# Patient Record
Sex: MALE | Race: BLACK OR AFRICAN AMERICAN | NOT HISPANIC OR LATINO | Employment: UNEMPLOYED | URBAN - METROPOLITAN AREA
[De-identification: names, ages, dates, MRNs, and addresses within clinical notes are randomized per-mention and may not be internally consistent; named-entity substitution may affect disease eponyms.]

---

## 2022-01-28 PROBLEM — Z12.11 COLON CANCER SCREENING: Status: ACTIVE | Noted: 2022-01-28

## 2022-08-13 ENCOUNTER — HOSPITAL ENCOUNTER (EMERGENCY)
Facility: HOSPITAL | Age: 52
Discharge: HOME | End: 2022-08-14
Attending: EMERGENCY MEDICINE
Payer: MEDICAID

## 2022-08-13 VITALS
HEART RATE: 68 BPM | RESPIRATION RATE: 17 BRPM | BODY MASS INDEX: 29.16 KG/M2 | TEMPERATURE: 98 F | DIASTOLIC BLOOD PRESSURE: 80 MMHG | SYSTOLIC BLOOD PRESSURE: 131 MMHG | WEIGHT: 220 LBS | OXYGEN SATURATION: 99 % | HEIGHT: 73 IN

## 2022-08-13 DIAGNOSIS — R42 DIZZINESS: ICD-10-CM

## 2022-08-13 DIAGNOSIS — E86.0 DEHYDRATION: ICD-10-CM

## 2022-08-13 DIAGNOSIS — N17.9 ACUTE KIDNEY INJURY: Primary | ICD-10-CM

## 2022-08-13 LAB
ALBUMIN SERPL BCP-MCNC: 4.7 G/DL (ref 3.5–5.2)
ALP SERPL-CCNC: 70 U/L (ref 55–135)
ALT SERPL W/O P-5'-P-CCNC: 23 U/L (ref 10–44)
ANION GAP SERPL CALC-SCNC: 13 MMOL/L (ref 8–16)
AST SERPL-CCNC: 20 U/L (ref 10–40)
BASOPHILS # BLD AUTO: 0.03 K/UL (ref 0–0.2)
BASOPHILS NFR BLD: 0.4 % (ref 0–1.9)
BILIRUB SERPL-MCNC: 0.3 MG/DL (ref 0.1–1)
BUN SERPL-MCNC: 28 MG/DL (ref 6–20)
CALCIUM SERPL-MCNC: 10.6 MG/DL (ref 8.7–10.5)
CHLORIDE SERPL-SCNC: 100 MMOL/L (ref 95–110)
CK SERPL-CCNC: 342 U/L (ref 20–200)
CO2 SERPL-SCNC: 25 MMOL/L (ref 23–29)
CREAT SERPL-MCNC: 2.4 MG/DL (ref 0.5–1.4)
DIFFERENTIAL METHOD: NORMAL
EOSINOPHIL # BLD AUTO: 0.1 K/UL (ref 0–0.5)
EOSINOPHIL NFR BLD: 1.2 % (ref 0–8)
ERYTHROCYTE [DISTWIDTH] IN BLOOD BY AUTOMATED COUNT: 11.5 % (ref 11.5–14.5)
EST. GFR  (NO RACE VARIABLE): 31 ML/MIN/1.73 M^2
GLUCOSE SERPL-MCNC: 159 MG/DL (ref 70–110)
HCT VFR BLD AUTO: 45.5 % (ref 40–54)
HGB BLD-MCNC: 14.8 G/DL (ref 14–18)
IMM GRANULOCYTES # BLD AUTO: 0.02 K/UL (ref 0–0.04)
IMM GRANULOCYTES NFR BLD AUTO: 0.3 % (ref 0–0.5)
LYMPHOCYTES # BLD AUTO: 3.2 K/UL (ref 1–4.8)
LYMPHOCYTES NFR BLD: 47.6 % (ref 18–48)
MAGNESIUM SERPL-MCNC: 2.2 MG/DL (ref 1.6–2.6)
MCH RBC QN AUTO: 28.5 PG (ref 27–31)
MCHC RBC AUTO-ENTMCNC: 32.5 G/DL (ref 32–36)
MCV RBC AUTO: 88 FL (ref 82–98)
MONOCYTES # BLD AUTO: 0.4 K/UL (ref 0.3–1)
MONOCYTES NFR BLD: 6.5 % (ref 4–15)
NEUTROPHILS # BLD AUTO: 3 K/UL (ref 1.8–7.7)
NEUTROPHILS NFR BLD: 44 % (ref 38–73)
NRBC BLD-RTO: 0 /100 WBC
PLATELET # BLD AUTO: 207 K/UL (ref 150–450)
PMV BLD AUTO: 11.4 FL (ref 9.2–12.9)
POTASSIUM SERPL-SCNC: 4 MMOL/L (ref 3.5–5.1)
PROT SERPL-MCNC: 9.2 G/DL (ref 6–8.4)
RBC # BLD AUTO: 5.2 M/UL (ref 4.6–6.2)
SODIUM SERPL-SCNC: 138 MMOL/L (ref 136–145)
TROPONIN I SERPL DL<=0.01 NG/ML-MCNC: <0.006 NG/ML (ref 0–0.03)
WBC # BLD AUTO: 6.74 K/UL (ref 3.9–12.7)

## 2022-08-13 PROCEDURE — 96360 HYDRATION IV INFUSION INIT: CPT

## 2022-08-13 PROCEDURE — 96361 HYDRATE IV INFUSION ADD-ON: CPT

## 2022-08-13 PROCEDURE — 80053 COMPREHEN METABOLIC PANEL: CPT | Performed by: EMERGENCY MEDICINE

## 2022-08-13 PROCEDURE — 99284 EMERGENCY DEPT VISIT MOD MDM: CPT | Mod: 25

## 2022-08-13 PROCEDURE — 84484 ASSAY OF TROPONIN QUANT: CPT | Performed by: EMERGENCY MEDICINE

## 2022-08-13 PROCEDURE — 93010 EKG 12-LEAD: ICD-10-PCS | Mod: ,,, | Performed by: INTERNAL MEDICINE

## 2022-08-13 PROCEDURE — 25000003 PHARM REV CODE 250: Performed by: EMERGENCY MEDICINE

## 2022-08-13 PROCEDURE — 83735 ASSAY OF MAGNESIUM: CPT | Performed by: EMERGENCY MEDICINE

## 2022-08-13 PROCEDURE — 82550 ASSAY OF CK (CPK): CPT | Performed by: EMERGENCY MEDICINE

## 2022-08-13 PROCEDURE — 93010 ELECTROCARDIOGRAM REPORT: CPT | Mod: ,,, | Performed by: INTERNAL MEDICINE

## 2022-08-13 PROCEDURE — 85025 COMPLETE CBC W/AUTO DIFF WBC: CPT | Performed by: EMERGENCY MEDICINE

## 2022-08-13 PROCEDURE — 93005 ELECTROCARDIOGRAM TRACING: CPT

## 2022-08-13 RX ADMIN — SODIUM CHLORIDE 1000 ML: 9 INJECTION, SOLUTION INTRAVENOUS at 10:08

## 2022-08-13 RX ADMIN — SODIUM CHLORIDE 1000 ML: 0.9 INJECTION, SOLUTION INTRAVENOUS at 09:08

## 2022-08-14 PROCEDURE — 96361 HYDRATE IV INFUSION ADD-ON: CPT

## 2022-08-14 NOTE — ED PROVIDER NOTES
Encounter Date: 8/13/2022       History     Chief Complaint   Patient presents with    Dizziness     Pt states that he got dizzy today around 1600 today and wanted to come in a get checked out. Pt states that he does work outside and this has never happened before and he denies being dizzy at this time but was concerned at the moment. Pt denies CP no SOB no nausea or vomiting.      51-year-old male with history of hypertension and pre diabetes presents after episode of lightheadedness and dizziness.  Symptoms occurred around 3:00 p.m. this afternoon while the patient was mowing the lawn.  He states he stepped in leaf felt dizzy and had to bend over to catch his breath.  He states symptoms improved and when he got home, he started experiencing cramping in his arms and legs.  He had no chest pain during this episode.  He reports he ate breakfast and drink water and Gatorade throughout the day.  He mows lawns for a living and states he can normally do this activity even in the heat without problem.        Review of patient's allergies indicates:  No Known Allergies  Past Medical History:   Diagnosis Date    Diabetes mellitus     Hypertension      Past Surgical History:   Procedure Laterality Date    COLONOSCOPY  01/28/2022    COLONOSCOPY N/A 1/28/2022    Procedure: COLONOSCOPY;  Surgeon: Don Mon MD;  Location: [facility];  Service: Endoscopy;  Laterality: N/A;    right knee       No family history on file.  Social History     Tobacco Use    Smoking status: Never Smoker    Smokeless tobacco: Never Used   Substance Use Topics    Alcohol use: No    Drug use: No     Review of Systems    Physical Exam     Initial Vitals [08/13/22 1919]   BP Pulse Resp Temp SpO2   117/78 90 18 98.5 °F (36.9 °C) 98 %      MAP       --         Physical Exam    Nursing note and vitals reviewed.  Constitutional: He appears well-developed and well-nourished. He is not diaphoretic. No distress.   HENT:   Head: Normocephalic and  atraumatic.   Eyes: Conjunctivae and EOM are normal. Pupils are equal, round, and reactive to light.   Neck: Neck supple.   Cardiovascular: Normal rate and regular rhythm.   Pulmonary/Chest: Breath sounds normal. No respiratory distress.   Abdominal: Abdomen is soft. Bowel sounds are normal.   Musculoskeletal:         General: No edema.      Cervical back: Neck supple.     Neurological: He is alert and oriented to person, place, and time. He has normal strength. No cranial nerve deficit or sensory deficit. GCS score is 15. GCS eye subscore is 4. GCS verbal subscore is 5. GCS motor subscore is 6.   No pronator drift, normal finger to nose testing   Skin: Skin is warm and dry.   Psychiatric: He has a normal mood and affect.         ED Course   Procedures  Labs Reviewed   COMPREHENSIVE METABOLIC PANEL - Abnormal; Notable for the following components:       Result Value    Glucose 159 (*)     BUN 28 (*)     Creatinine 2.4 (*)     Calcium 10.6 (*)     Total Protein 9.2 (*)     eGFR 31 (*)     All other components within normal limits   CK - Abnormal; Notable for the following components:     (*)     All other components within normal limits   CBC W/ AUTO DIFFERENTIAL   TROPONIN I   MAGNESIUM     EKG Readings: (Independently Interpreted)   Normal sinus rhythm, rate 64 beats per minute, normal VT interval,  milliseconds, no STEMI noted.       Imaging Results          X-Ray Chest AP Portable (Final result)  Result time 08/13/22 21:50:31    Final result by Jorden Otero MD (08/13/22 21:50:31)                 Impression:      No acute cardiopulmonary process identified.      Electronically signed by: Jorden Otero MD  Date:    08/13/2022  Time:    21:50             Narrative:    EXAMINATION:  XR CHEST AP PORTABLE    CLINICAL HISTORY:  light headed;    TECHNIQUE:  Single frontal view of the chest was performed.    COMPARISON:  None    FINDINGS:  Cardiac silhouette is normal in size.  Lungs are hypoinflated.   No evidence of focal consolidative process, pneumothorax, or significant pleural effusion.  No acute osseous abnormality identified.                                 Medications   sodium chloride 0.9% bolus 1,000 mL (has no administration in time range)   sodium chloride 0.9% bolus 1,000 mL (1,000 mLs Intravenous New Bag 8/13/22 2101)     Medical Decision Making:   Initial Assessment:   51-year-old male presents after episode of lightheadedness and dizziness that occurred while mowing the lawn in the heat this afternoon.  Symptoms resolved but states he developed some cramping when he returned home.  Currently feels improved.  He has been drinking water and Gatorade.  He did not have any chest pain during the episode.  On exam, the patient is well-appearing and in no distress.  Differential includes not limited to ACS, dehydration, electrolyte disturbance, Ortho status, rhabdomyolysis.  Workup initiated with labs including troponin, CPK, will treat with IV fluids.  ED Management:  On reassessment, patient reports feeling improved and denies complaints.  Labs reviewed, his creatinine is elevated at 2.4.  I reviewed previous records here as well as in care everywhere and do not see a previous creatinine in his chart.  The patient denies any previous history of kidney problems.  I recommend observation in the hospital for continued IV fluids and repeat creatinine.  The patient declines at this time stating he wants to go home and does not want to be hospitalized. The patient has decision making capacity . Patient has chosen to refuse medical evaluation/treatment and is able to communicate this verbally. Patient understands the relative risks, benefits, alternatives and consequences. Patient is able to reason, gives an adequate explanation of why he refuses evaluation/treatment.  As an alternate plan, we will give additional IV bolus.  I have instructed the patient to call his primary care physician on Monday morning for  repeat labs.  I also reviewed strict return precautions with the patient and advised him to return to the ED should he changes mind about being observed in the hospital for IV fluids. I have also asked him to hold his metformin and losartan until he follows up and has his kidney function rechecked.                       Clinical Impression:   Final diagnoses:  [R42] Dizziness  [N17.9] Acute kidney injury (Primary)  [E86.0] Dehydration          ED Disposition Condition    AMA               Roberta Ye MD  08/13/22 2765

## 2022-08-14 NOTE — ED TRIAGE NOTES
Pt states he became dizzy after cutting grass this afternoon, denies any CP or SOB, states he had mild cramping of extremities, resp even, Vitals WNL, NADN at this time

## 2022-08-26 ENCOUNTER — LAB VISIT (OUTPATIENT)
Dept: LAB | Facility: HOSPITAL | Age: 52
End: 2022-08-26
Attending: INTERNAL MEDICINE
Payer: MEDICAID

## 2022-08-26 DIAGNOSIS — Z00.00 ANNUAL PHYSICAL EXAM: ICD-10-CM

## 2022-08-26 DIAGNOSIS — Z11.59 NEED FOR HEPATITIS C SCREENING TEST: ICD-10-CM

## 2022-08-26 DIAGNOSIS — Z11.4 ENCOUNTER FOR SCREENING FOR HIV: ICD-10-CM

## 2022-08-26 PROBLEM — I10 HYPERTENSION: Status: ACTIVE | Noted: 2022-08-26

## 2022-08-26 PROBLEM — E11.9 DIABETES MELLITUS: Status: ACTIVE | Noted: 2022-08-26

## 2022-08-26 LAB
ALBUMIN SERPL BCP-MCNC: 4.1 G/DL (ref 3.5–5.2)
ALP SERPL-CCNC: 65 U/L (ref 55–135)
ALT SERPL W/O P-5'-P-CCNC: 19 U/L (ref 10–44)
ANION GAP SERPL CALC-SCNC: 10 MMOL/L (ref 8–16)
AST SERPL-CCNC: 18 U/L (ref 10–40)
BASOPHILS # BLD AUTO: 0.02 K/UL (ref 0–0.2)
BASOPHILS NFR BLD: 0.4 % (ref 0–1.9)
BILIRUB SERPL-MCNC: 0.4 MG/DL (ref 0.1–1)
BUN SERPL-MCNC: 15 MG/DL (ref 6–20)
CALCIUM SERPL-MCNC: 10.2 MG/DL (ref 8.7–10.5)
CHLORIDE SERPL-SCNC: 106 MMOL/L (ref 95–110)
CHOLEST SERPL-MCNC: 173 MG/DL (ref 120–199)
CHOLEST/HDLC SERPL: 3.3 {RATIO} (ref 2–5)
CO2 SERPL-SCNC: 26 MMOL/L (ref 23–29)
CREAT SERPL-MCNC: 1.6 MG/DL (ref 0.5–1.4)
DIFFERENTIAL METHOD: ABNORMAL
EOSINOPHIL # BLD AUTO: 0.1 K/UL (ref 0–0.5)
EOSINOPHIL NFR BLD: 2 % (ref 0–8)
ERYTHROCYTE [DISTWIDTH] IN BLOOD BY AUTOMATED COUNT: 11.9 % (ref 11.5–14.5)
EST. GFR  (NO RACE VARIABLE): 51 ML/MIN/1.73 M^2
GLUCOSE SERPL-MCNC: 115 MG/DL (ref 70–110)
HCT VFR BLD AUTO: 41.1 % (ref 40–54)
HDLC SERPL-MCNC: 53 MG/DL (ref 40–75)
HDLC SERPL: 30.6 % (ref 20–50)
HGB BLD-MCNC: 13 G/DL (ref 14–18)
IMM GRANULOCYTES # BLD AUTO: 0.01 K/UL (ref 0–0.04)
IMM GRANULOCYTES NFR BLD AUTO: 0.2 % (ref 0–0.5)
LDLC SERPL CALC-MCNC: 86.4 MG/DL (ref 63–159)
LYMPHOCYTES # BLD AUTO: 2.2 K/UL (ref 1–4.8)
LYMPHOCYTES NFR BLD: 43.6 % (ref 18–48)
MCH RBC QN AUTO: 28.4 PG (ref 27–31)
MCHC RBC AUTO-ENTMCNC: 31.6 G/DL (ref 32–36)
MCV RBC AUTO: 90 FL (ref 82–98)
MONOCYTES # BLD AUTO: 0.4 K/UL (ref 0.3–1)
MONOCYTES NFR BLD: 7.3 % (ref 4–15)
NEUTROPHILS # BLD AUTO: 2.3 K/UL (ref 1.8–7.7)
NEUTROPHILS NFR BLD: 46.5 % (ref 38–73)
NONHDLC SERPL-MCNC: 120 MG/DL
NRBC BLD-RTO: 0 /100 WBC
PLATELET # BLD AUTO: 201 K/UL (ref 150–450)
PMV BLD AUTO: 13.3 FL (ref 9.2–12.9)
POTASSIUM SERPL-SCNC: 4.6 MMOL/L (ref 3.5–5.1)
PROT SERPL-MCNC: 7.9 G/DL (ref 6–8.4)
RBC # BLD AUTO: 4.58 M/UL (ref 4.6–6.2)
SODIUM SERPL-SCNC: 142 MMOL/L (ref 136–145)
TRIGL SERPL-MCNC: 168 MG/DL (ref 30–150)
TSH SERPL DL<=0.005 MIU/L-ACNC: 1.02 UIU/ML (ref 0.4–4)
WBC # BLD AUTO: 4.93 K/UL (ref 3.9–12.7)

## 2022-08-26 PROCEDURE — 85025 COMPLETE CBC W/AUTO DIFF WBC: CPT | Performed by: INTERNAL MEDICINE

## 2022-08-26 PROCEDURE — 86803 HEPATITIS C AB TEST: CPT | Performed by: INTERNAL MEDICINE

## 2022-08-26 PROCEDURE — 84443 ASSAY THYROID STIM HORMONE: CPT | Performed by: INTERNAL MEDICINE

## 2022-08-26 PROCEDURE — 80053 COMPREHEN METABOLIC PANEL: CPT | Performed by: INTERNAL MEDICINE

## 2022-08-26 PROCEDURE — 80061 LIPID PANEL: CPT | Performed by: INTERNAL MEDICINE

## 2022-08-26 PROCEDURE — 83036 HEMOGLOBIN GLYCOSYLATED A1C: CPT | Performed by: INTERNAL MEDICINE

## 2022-08-26 PROCEDURE — 87389 HIV-1 AG W/HIV-1&-2 AB AG IA: CPT | Performed by: INTERNAL MEDICINE

## 2022-08-26 PROCEDURE — 36415 COLL VENOUS BLD VENIPUNCTURE: CPT | Performed by: INTERNAL MEDICINE

## 2022-08-27 LAB
ESTIMATED AVG GLUCOSE: 183 MG/DL (ref 68–131)
HBA1C MFR BLD: 8 % (ref 4–5.6)

## 2022-08-29 PROBLEM — E11.21 MACROALBUMINURIC DIABETIC NEPHROPATHY: Status: ACTIVE | Noted: 2022-08-29

## 2022-08-29 PROBLEM — N18.31 STAGE 3A CHRONIC KIDNEY DISEASE: Status: ACTIVE | Noted: 2022-08-29

## 2022-08-29 PROBLEM — D64.9 NORMOCYTIC ANEMIA: Status: ACTIVE | Noted: 2022-08-29

## 2022-08-29 LAB
HCV AB SERPL QL IA: NEGATIVE
HIV 1+2 AB+HIV1 P24 AG SERPL QL IA: NEGATIVE

## 2022-09-07 ENCOUNTER — TELEPHONE (OUTPATIENT)
Dept: FAMILY MEDICINE | Facility: CLINIC | Age: 52
End: 2022-09-07
Payer: MEDICAID

## 2022-09-07 NOTE — TELEPHONE ENCOUNTER
----- Message from Tawny Mckeon sent at 9/7/2022  8:28 AM CDT -----  Type:  Sooner Appointment Request    Patient is requesting a sooner appointment.  Patient declined first available appointment listed as well as another facility and provider .  Patient will not accept being placed on the waitlist and is requesting a message be sent to doctor.    Name of Caller: self     When is the first available appointment? 10/20     Symptoms: follow up     Would the patient rather a call back or a response via [facility]? Call back     Best Call Back Number: [phone]

## 2022-09-07 NOTE — TELEPHONE ENCOUNTER
Call placed to patient, and informed that he will need to call the [facility] where he sees  for an appointment. Patient acknowledged understanding.

## 2022-09-09 ENCOUNTER — LAB VISIT (OUTPATIENT)
Dept: LAB | Facility: HOSPITAL | Age: 52
End: 2022-09-09
Attending: INTERNAL MEDICINE
Payer: MEDICAID

## 2022-09-09 DIAGNOSIS — D64.9 NORMOCYTIC ANEMIA: ICD-10-CM

## 2022-09-09 LAB
FERRITIN SERPL-MCNC: 463 NG/ML (ref 20–300)
IRON SERPL-MCNC: 117 UG/DL (ref 45–160)
SATURATED IRON: 31 % (ref 20–50)
TOTAL IRON BINDING CAPACITY: 383 UG/DL (ref 250–450)
TRANSFERRIN SERPL-MCNC: 259 MG/DL (ref 200–375)

## 2022-09-09 PROCEDURE — 82728 ASSAY OF FERRITIN: CPT | Performed by: INTERNAL MEDICINE

## 2022-09-09 PROCEDURE — 82746 ASSAY OF FOLIC ACID SERUM: CPT | Performed by: INTERNAL MEDICINE

## 2022-09-09 PROCEDURE — 84466 ASSAY OF TRANSFERRIN: CPT | Performed by: INTERNAL MEDICINE

## 2022-09-09 PROCEDURE — 82607 VITAMIN B-12: CPT | Performed by: INTERNAL MEDICINE

## 2022-09-10 LAB
FOLATE SERPL-MCNC: 29.8 NG/ML (ref 4–24)
VIT B12 SERPL-MCNC: 1122 PG/ML (ref 210–950)

## 2022-09-12 PROBLEM — D63.8 ANEMIA OF CHRONIC DISEASE: Status: ACTIVE | Noted: 2022-08-29

## 2022-12-17 DIAGNOSIS — E08.00 DIABETES MELLITUS DUE TO UNDERLYING CONDITION WITH HYPEROSMOLARITY WITHOUT COMA, WITHOUT LONG-TERM CURRENT USE OF INSULIN: ICD-10-CM

## 2022-12-17 NOTE — TELEPHONE ENCOUNTER
Care Due:                  Date            Visit Type   Department     Provider  --------------------------------------------------------------------------------    Last Visit: None Found      None         None Found  Next Visit: None Scheduled  None         None Found                                                            Last  Test          Frequency    Reason                     Performed    Due Date  --------------------------------------------------------------------------------    Office Visit  12 months..  glimepiride..............  Not Found    Overdue    HBA1C.......  6 months...  glimepiride..............  08- 02-    [employer] Embedded Care Gaps. Reference number: [id number]. 12/17/2022   4:37:44 PM CST

## 2022-12-19 NOTE — TELEPHONE ENCOUNTER
Refill Routing Note   Medication(s) are not appropriate for processing by [facility] for the following reason(s):      - Required laboratory values are abnormal    [facility] action(s):  Defer Medication-related problems identified:   Requires labs  Requires appointment        Medication reconciliation completed: No     Appointments  past 12m or future 3m with PCP    Date Provider   Last Visit   9/9/2022 Bonny Jones MD   Next Visit   Visit date not found Bonny Jones MD   ED visits in past 90 days: 0        Note composed:2:03 PM 12/19/2022

## 2022-12-20 RX ORDER — GLIMEPIRIDE 2 MG/1
TABLET ORAL
Qty: 90 TABLET | Refills: 0 | OUTPATIENT
Start: 2022-12-20

## 2023-01-04 ENCOUNTER — HOSPITAL ENCOUNTER (EMERGENCY)
Facility: HOSPITAL | Age: 53
Discharge: HOME | End: 2023-01-04
Attending: EMERGENCY MEDICINE
Payer: MEDICAID

## 2023-01-04 VITALS
RESPIRATION RATE: 20 BRPM | DIASTOLIC BLOOD PRESSURE: 100 MMHG | TEMPERATURE: 98 F | WEIGHT: 225 LBS | OXYGEN SATURATION: 99 % | HEIGHT: 73 IN | HEART RATE: 73 BPM | SYSTOLIC BLOOD PRESSURE: 178 MMHG | BODY MASS INDEX: 29.82 KG/M2

## 2023-01-04 DIAGNOSIS — F07.81 POST CONCUSSIVE SYNDROME: Primary | ICD-10-CM

## 2023-01-04 DIAGNOSIS — T14.90XA TRAUMA: ICD-10-CM

## 2023-01-04 LAB
ALBUMIN SERPL BCP-MCNC: 4.3 G/DL (ref 3.5–5.2)
ALP SERPL-CCNC: 82 U/L (ref 55–135)
ALT SERPL W/O P-5'-P-CCNC: 23 U/L (ref 10–44)
ANION GAP SERPL CALC-SCNC: 8 MMOL/L (ref 8–16)
AST SERPL-CCNC: 15 U/L (ref 10–40)
BASOPHILS # BLD AUTO: 0.03 K/UL (ref 0–0.2)
BASOPHILS NFR BLD: 0.6 % (ref 0–1.9)
BILIRUB SERPL-MCNC: 0.2 MG/DL (ref 0.1–1)
BUN SERPL-MCNC: 18 MG/DL (ref 6–20)
CALCIUM SERPL-MCNC: 10.1 MG/DL (ref 8.7–10.5)
CHLORIDE SERPL-SCNC: 104 MMOL/L (ref 95–110)
CO2 SERPL-SCNC: 25 MMOL/L (ref 23–29)
CREAT SERPL-MCNC: 1.8 MG/DL (ref 0.5–1.4)
DIFFERENTIAL METHOD: ABNORMAL
EOSINOPHIL # BLD AUTO: 0.1 K/UL (ref 0–0.5)
EOSINOPHIL NFR BLD: 1.5 % (ref 0–8)
ERYTHROCYTE [DISTWIDTH] IN BLOOD BY AUTOMATED COUNT: 11.4 % (ref 11.5–14.5)
EST. GFR  (NO RACE VARIABLE): 44 ML/MIN/1.73 M^2
GLUCOSE SERPL-MCNC: 339 MG/DL (ref 70–110)
HCT VFR BLD AUTO: 43.3 % (ref 40–54)
HGB BLD-MCNC: 13.7 G/DL (ref 14–18)
IMM GRANULOCYTES # BLD AUTO: 0 K/UL (ref 0–0.04)
IMM GRANULOCYTES NFR BLD AUTO: 0 % (ref 0–0.5)
LYMPHOCYTES # BLD AUTO: 2.3 K/UL (ref 1–4.8)
LYMPHOCYTES NFR BLD: 43.9 % (ref 18–48)
MCH RBC QN AUTO: 28 PG (ref 27–31)
MCHC RBC AUTO-ENTMCNC: 31.6 G/DL (ref 32–36)
MCV RBC AUTO: 89 FL (ref 82–98)
MONOCYTES # BLD AUTO: 0.4 K/UL (ref 0.3–1)
MONOCYTES NFR BLD: 6.8 % (ref 4–15)
NEUTROPHILS # BLD AUTO: 2.4 K/UL (ref 1.8–7.7)
NEUTROPHILS NFR BLD: 47.2 % (ref 38–73)
NRBC BLD-RTO: 0 /100 WBC
PLATELET # BLD AUTO: 191 K/UL (ref 150–450)
PMV BLD AUTO: 12.1 FL (ref 9.2–12.9)
POTASSIUM SERPL-SCNC: 4.3 MMOL/L (ref 3.5–5.1)
PROT SERPL-MCNC: 8 G/DL (ref 6–8.4)
RBC # BLD AUTO: 4.89 M/UL (ref 4.6–6.2)
SODIUM SERPL-SCNC: 137 MMOL/L (ref 136–145)
WBC # BLD AUTO: 5.17 K/UL (ref 3.9–12.7)

## 2023-01-04 PROCEDURE — 99285 EMERGENCY DEPT VISIT HI MDM: CPT | Mod: 25

## 2023-01-04 PROCEDURE — 25500020 PHARM REV CODE 255: Performed by: EMERGENCY MEDICINE

## 2023-01-04 PROCEDURE — 80053 COMPREHEN METABOLIC PANEL: CPT | Performed by: EMERGENCY MEDICINE

## 2023-01-04 PROCEDURE — 85025 COMPLETE CBC W/AUTO DIFF WBC: CPT | Performed by: EMERGENCY MEDICINE

## 2023-01-04 RX ORDER — HYDROMORPHONE HYDROCHLORIDE 1 MG/ML
0.5 INJECTION, SOLUTION INTRAMUSCULAR; INTRAVENOUS; SUBCUTANEOUS
Status: DISCONTINUED | OUTPATIENT
Start: 2023-01-04 | End: 2023-01-04

## 2023-01-04 RX ORDER — BACLOFEN 10 MG/1
10 TABLET ORAL 3 TIMES DAILY
Qty: 30 TABLET | Refills: 0 | Status: SHIPPED | OUTPATIENT
Start: 2023-01-04 | End: 2023-05-23

## 2023-01-04 RX ORDER — ONDANSETRON 8 MG/1
8 TABLET, ORALLY DISINTEGRATING ORAL EVERY 6 HOURS PRN
Qty: 30 TABLET | Refills: 0 | Status: SHIPPED | OUTPATIENT
Start: 2023-01-04 | End: 2023-05-23

## 2023-01-04 RX ORDER — MECLIZINE HYDROCHLORIDE 50 MG/1
50 TABLET ORAL 2 TIMES DAILY PRN
Qty: 20 TABLET | Refills: 0 | Status: SHIPPED | OUTPATIENT
Start: 2023-01-04 | End: 2023-05-23

## 2023-01-04 RX ADMIN — IOHEXOL 75 ML: 350 INJECTION, SOLUTION INTRAVENOUS at 03:01

## 2023-01-04 NOTE — ED PROVIDER NOTES
Encounter Date: 1/4/2023       History     Chief Complaint   Patient presents with    Headache     Pt to ED via EMS with complaints of headache, dizziness and blurry vision after multiple 2x12 boards fell on his head. Pt also complains of right arm pain after falling on the ground. Pt denies LOC, N/V. Pt denies use of blood thinners or neck pain.      52 y.o. male Past Medical History:  No date: Diabetes mellitus  No date: Diabetes mellitus type I  No date: Hypertension      Patient states that just prior to arrival he was walking on the street and a Pallet filled with lumbar essentially fell on him striking his head, right shoulder, and arm thereby knocking him over .  Patient is not on blood thinners denies LOC, does endorse headache, dizziness, and blurry vision, as well as right arm pain.    Review of patient's allergies indicates:  No Known Allergies  Past Medical History:   Diagnosis Date    Diabetes mellitus     Diabetes mellitus type I     Hypertension      Past Surgical History:   Procedure Laterality Date    COLONOSCOPY  01/28/2022    COLONOSCOPY N/A 01/28/2022    Procedure: COLONOSCOPY;  Surgeon: Don Mon MD;  Location: [facility];  Service: Endoscopy;  Laterality: N/A;    right knee       Family History   Problem Relation Age of Onset    Kidney disease Mother     Hypertension Father     Diabetes type II Father      Social History     Tobacco Use    Smoking status: Never    Smokeless tobacco: Never   Substance Use Topics    Alcohol use: No    Drug use: No     Review of Systems   Constitutional:  Negative for fever.   HENT:  Negative for sore throat.    Respiratory:  Negative for shortness of breath.    Cardiovascular:  Negative for chest pain.   Gastrointestinal:  Negative for nausea.   Genitourinary:  Negative for dysuria.   Musculoskeletal:  Negative for back pain.   Skin:  Negative for rash.   Neurological:  Negative for weakness.   Hematological:  Does not bruise/bleed easily.   All other  systems reviewed and are negative.    Physical Exam     Initial Vitals   BP Pulse Resp Temp SpO2   01/04/23 1345 01/04/23 1345 01/04/23 1345 01/04/23 1349 01/04/23 1345   (!) 160/80 100 18 97.7 °F (36.5 °C) 100 %      MAP       --                Physical Exam    Nursing note and vitals reviewed.  Constitutional: He appears well-developed and well-nourished.   HENT:   Head: Normocephalic and atraumatic.   Eyes: EOM are normal. Pupils are equal, round, and reactive to light.   Cardiovascular:  Normal rate and regular rhythm.           Pulmonary/Chest: Effort normal.   Abdominal: He exhibits no distension.   Musculoskeletal:         General: No tenderness or edema.     Neurological: He is alert and oriented to person, place, and time. No cranial nerve deficit.   Skin: Skin is warm and dry.   Psychiatric: He has a normal mood and affect.     Ttp R shoulder, upper arm and elbow. Rom limited by pain  Normocephalic/atraumatic,   No midline tenderness on any spinal body on the neck or   On the back no tenderness to chest wall ribs abdomen or other extremities  ED Course   Procedures  Labs Reviewed   CBC W/ AUTO DIFFERENTIAL - Abnormal; Notable for the following components:       Result Value    Hemoglobin 13.7 (*)     MCHC 31.6 (*)     RDW 11.4 (*)     All other components within normal limits   COMPREHENSIVE METABOLIC PANEL - Abnormal; Notable for the following components:    Glucose 339 (*)     Creatinine 1.8 (*)     eGFR 44 (*)     All other components within normal limits          Imaging Results              CT Cervical Spine Without Contrast (Final result)  Result time 01/04/23 16:06:01      Final result by Rashi Correa III, MD (01/04/23 16:06:01)                   Impression:      No acute process seen.    Mild DJD.      Electronically signed by: Rashi Correa MD  Date:    01/04/2023  Time:    16:06               Narrative:    EXAMINATION:  CT CERVICAL SPINE WITHOUT CONTRAST    CLINICAL HISTORY:  Neck trauma  (Age >= 65y);    FINDINGS:  Odontoid prevertebral soft tissues and posterior elements are intact.  The facets are well aligned.  The temporal bones show nothing unusual.  There is mild DJD.  No fracture dislocation bone destruction seen.  The upper lungs are clear.  No soft tissue masses are seen.  The intracranial structures show nothing unusual.  No disc herniation, spinal canal stenosis, or neural foraminal stenosis seen.                                       CTA Head and Neck (xpd) (Final result)  Result time 01/04/23 16:11:06      Final result by Chip Crowe MD (01/04/23 16:11:06)                   Impression:      No evidence of acute hemorrhage or infarction.    Unremarkable CT arteriogram.  No evidence of high-grade stenosis or occlusion.    Borderline thyromegaly.    Please see separate report of the cervical spine.      Electronically signed by: Chip Crowe MD  Date:    01/04/2023  Time:    16:11               Narrative:    EXAMINATION:  CTA HEAD AND NECK (XPD)    CLINICAL HISTORY:  Dizziness, persistent/recurrent, cardiac or vascular cause suspected;    TECHNIQUE:  Axial CT images obtained throughout the region of the head before and after the administration of intravenous contrast.  CT angiogram was performed through the cervical and intracranial vasculature during the IV bolus administration of 75mL of Omnipaque 350.  Multiplanar MPR and MIP reformats were performed.    COMPARISON:  None    FINDINGS:  The ventricles are normal in size without evidence of hydrocephalus.    The brain appears within normal limits. No parenchymal mass, hemorrhage, edema or major vascular distribution infarct.    No extra-axial blood or fluid collections.    The cranium appears intact.  Mastoid air cells are clear. Mild patchy mucosal thickening in the visualized paranasal sinuses.    Thyroid gland appears mildly diffusely enlarged      CTA:    The aortic arch maintains a normal branching pattern.    The common and  internal carotid arteries are normal in course and caliber. No significant stenosis in either carotid bifurcation.    The vertebral origins are patent. The cervical vertebral arteries are normal in course and caliber. Vertebrobasilar system is within normal limits without focal abnormality.    The anterior, middle, and posterior cerebral arteries are within normal limits, without evidence of significant stenosis, focal occlusion, or intracranial aneurysm formation.                                       X-Ray Hand 3 view Right (Final result)  Result time 01/04/23 14:53:02      Final result by Rashi Correa III, MD (01/04/23 14:53:02)                   Narrative:    EXAMINATION:  XR HAND COMPLETE 3 VIEW RIGHT    CLINICAL HISTORY:  trauma;    FINDINGS:  Hand complete three views right: There is a small metallic foreign body in the soft tissues between the 3rd and 4th metacarpals.  There is mild DJD.  There is remote trauma of the 5th metacarpal.  No acute fracture dislocation bone destruction seen.      Electronically signed by: Rashi Correa MD  Date:    01/04/2023  Time:    14:53                                     X-Ray Shoulder Trauma Right (Final result)  Result time 01/04/23 14:49:44      Final result by Rashi Correa III, MD (01/04/23 14:49:44)                   Narrative:    EXAMINATION:  XR SHOULDER TRAUMA 3 VIEW RIGHT    CLINICAL HISTORY:  Injury, unspecified, initial encounter    FINDINGS:  Shoulder three views right: No fracture dislocation bone destruction seen.  No acute trauma seen.      Electronically signed by: Rashi Correa MD  Date:    01/04/2023  Time:    14:49                                     X-Ray Humerus 2 View Right (Final result)  Result time 01/04/23 14:50:06      Final result by Rashi Correa III, MD (01/04/23 14:50:06)                   Narrative:    EXAMINATION:  XR HUMERUS 2 VIEW RIGHT    CLINICAL HISTORY:  Injury, unspecified, initial encounter    FINDINGS:  Humerus two  views right: No fracture, dislocation, or bone destruction seen.  There is a triceps insertion spur on the olecranon.      Electronically signed by: Rashi Correa MD  Date:    01/04/2023  Time:    14:50                                     X-Ray Elbow Complete Right (Final result)  Result time 01/04/23 14:50:24      Final result by Rashi Correa III, MD (01/04/23 14:50:24)                   Narrative:    EXAMINATION:  XR ELBOW COMPLETE 3 VIEW RIGHT    CLINICAL HISTORY:  Injury, unspecified, initial encounter    FINDINGS:  Elbow complete three views right: There is a triceps insertion spur on the olecranon.  No joint effusion seen.  No fracture dislocation bone destruction seen.      Electronically signed by: Rashi Correa MD  Date:    01/04/2023  Time:    14:50                                     X-Ray Clavicle Right (Final result)  Result time 01/04/23 14:50:38      Final result by Rashi Correa III, MD (01/04/23 14:50:38)                   Narrative:    EXAMINATION:  XR CLAVICLE RIGHT    CLINICAL HISTORY:  Injury, unspecified, initial encounter    FINDINGS:  Clavicle right: No fracture dislocation bone destruction seen.  There is mild DJD of the AC joint.      Electronically signed by: Rashi Correa MD  Date:    01/04/2023  Time:    14:50                                     Medications   iohexoL (OMNIPAQUE 350) injection 75 mL (75 mLs Intravenous Given 1/4/23 1539)                      Have ordered CT head, C-spine, and CTA head and neck to evaluate for patient's symptoms. will do screening labs and xr RUE/Shoulder/clavicle     After review of the patient's physical exam, ED testing, and history/symptoms, relevant labs, imaging, available outside records  a wide differential was considered including but not limited to: infectious, traumatic, vascular, toxicological, malignant, ischemic, embolic, psychological, genetic, idiopathic and other etiologies.  labs/imaging/interventions include:   Labs Reviewed    CBC W/ AUTO DIFFERENTIAL - Abnormal; Notable for the following components:       Result Value    Hemoglobin 13.7 (*)     MCHC 31.6 (*)     RDW 11.4 (*)     All other components within normal limits   COMPREHENSIVE METABOLIC PANEL - Abnormal; Notable for the following components:    Glucose 339 (*)     Creatinine 1.8 (*)     eGFR 44 (*)     All other components within normal limits     CT Cervical Spine Without Contrast   Final Result      No acute process seen.      Mild DJD.         Electronically signed by: Rashi Correa MD   Date:    01/04/2023   Time:    16:06      CTA Head and Neck (xpd)   Final Result      No evidence of acute hemorrhage or infarction.      Unremarkable CT arteriogram.  No evidence of high-grade stenosis or occlusion.      Borderline thyromegaly.      Please see separate report of the cervical spine.         Electronically signed by: Chip Crowe MD   Date:    01/04/2023   Time:    16:11      X-Ray Hand 3 view Right   Final Result      X-Ray Shoulder Trauma Right   Final Result      X-Ray Humerus 2 View Right   Final Result      X-Ray Elbow Complete Right   Final Result      X-Ray Clavicle Right   Final Result             The suspected diagnosis, treatment and plan were discussed with the patient. All questions or concerns have been addressed.     Have refer to neurology will put on empiric treatment for nausea and postconcussive syndrome   Patient removed own C-collar  Clinical Impression:   Final diagnoses:  [T14.90XA] Trauma  [F07.81] Post concussive syndrome (Primary)               Caleb Tillman MD  01/04/23 [id number]       Caleb Tillman MD  01/04/23 9752

## 2023-01-04 NOTE — Clinical Note
"Jarrod"Angie Mercado was seen and treated in our emergency department on 1/4/2023.  He may return to work on 01/06/2023.       If you have any questions or concerns, please don't hesitate to call.      Caleb Tillman MD"

## 2023-01-04 NOTE — ED TRIAGE NOTES
Pt to ED via EMS with complaints of headache, R shoulder pain, and R arm pain after multiple pallets and 2X12's fell on pt PTA. Pt reports wood fell onto pts head and slid down R side. Pt now states has generalized headache. Tender R shoulder and tender R elbow. Pt has small abrasion and R elbow. Denies LOC. All vitals WNL. NADN

## 2023-01-04 NOTE — DISCHARGE INSTRUCTIONS
Thank you for coming to our Emergency Department today. It is important to remember that some problems or medical conditions are difficult to diagnose and may not be found or addressed during your Emergency Department visit.     Be sure to follow up with your primary care doctor and review all labs/imaging/tests that were performed during your ER visit with them. Some labs/imaging/tests may be outside of the normal range, and require non-emergent follow-up and/or further investigation/treatment/procedures/testing to help diagnose/exclude/prevent complications or other potentially serious medical conditions that were not discussed or addressed during your ER visit.    If you do not have a primary care doctor, you may contact the one listed on your discharge paperwork or you may also call the [facility] Appointment Desk at [phone] to schedule an appointment and establish care with one. It is important to your health that you have a primary care doctor.    Please take all medications as directed. All medications may potentially have side-effects and it is impossible to predict which medications may give you side-effects or what side-effects (if any) they will give you.. If you feel that you are having a negative effect or side-effect of any medication you should immediately stop taking them and seek medical attention. If you feel that you are having a life-threatening reaction call 911.    Return to the ER with any questions/concerns, new/concerning symptoms, worsening or failure to improve.     Do not drive, swim, climb to height, take a bath, operate heavy machinery, drink alcohol or take potentially sedating medications, sign any legal documents or make any important decisions for 24 hours if you have received any pain medications, sedatives or mood altering drugs during your ER visit or within 24 hours of taking them if they have been prescribed to you.     You can find additional resources for Dentists,  hearing aids, durable medical equipment, low cost pharmacies and other resources at https://[facility].org    BELOW THIS LINE ONLY APPLIES IF YOU HAVE A COVID TEST PENDING OR IF YOU HAVE BEEN DIAGNOSED WITH COVID:  Please access [facility] to review the results of your test. Until the results of your COVID test return, you should isolate yourself so as not to potentially spread illness to others.   If your COVID test returns positive, you should isolate yourself so as not to spread illness to others. After five full days, if you are feeling better and you have not had fever for 24 hours, you can return to your typical daily activities, but you must wear a mask around others for an additional 5 days.   If your COVID test returns negative and you are either unvaccinated or more than six months out from your two-dose vaccine and are not yet boosted, you should still quarantine for 5 full days followed by strict mask use for an additional 5 full days.   If your COVID test returns negative and you have received your 2-dose initial vaccine as well as a booster, you should continue strict mask use for 10 full days after the exposure.  For all those exposed, best practice includes a test at day 5 after the exposure. This can be a home test or a test through one of the many testing centers throughout our community.   Masking is always advised to limit the spread of COVID. Cdc.gov is an excellent site to obtain the latest up to date recommendations regarding COVID and COVID testing.     CDC Testing and Quarantine Guidelines for patients with exposure to a known-positive COVID-19 person:  A close exposure is defined as anyone who has had an exposure (masked or unmasked) to a known COVID -19 positive person within 6 feet of someone for a cumulative total of 15 minutes or more over a 24-hour period.   Vaccinated and/or if you recently had a positive covid test within 90 days do NOT need to quarantine after contact with someone  who had COVID-19 unless you develop symptoms.   Fully vaccinated people who have not had a positive test within 90 days, should get tested 3-5 days after their exposure, even if they don't have symptoms and wear a mask indoors in public for 14 days following exposure or until their test result is negative.      Unvaccinated and/or NOT had a positive test within 90 days and meet close exposure  You are required by CDC guidelines to quarantine for at least 5 days from time of exposure followed by 5 days of strict masking. It is recommended, but not required to test after 5 days, unless you develop symptoms, in which case you should test at that time.  If you get tested after 5 days and your test is positive, your 5 day period of isolation starts the day of the positive test.    If your exposure does not meet the above definition, you can return to your normal daily activities to include social distancing, wearing a mask and frequent handwashing.      Here is a link to guidance from the CDC:  https://www.cdc.gov/media/releases/2021/s1227-isolation-quarantine-guidance.html      [facility] Testing Sites:  https://ldh.[place].gov/page/3934      [facility] website with testing locations and guidance:  [email or web address]

## 2023-05-23 ENCOUNTER — TELEPHONE (OUTPATIENT)
Dept: FAMILY MEDICINE | Facility: CLINIC | Age: 53
End: 2023-05-23
Payer: MEDICAID

## 2023-05-23 PROBLEM — M72.2 PLANTAR FASCIITIS OF LEFT FOOT: Status: ACTIVE | Noted: 2023-05-23

## 2023-05-23 PROBLEM — M25.511 CHRONIC RIGHT SHOULDER PAIN: Status: ACTIVE | Noted: 2023-05-23

## 2023-05-23 PROBLEM — G44.329 CHRONIC POST-TRAUMATIC HEADACHE, NOT INTRACTABLE: Status: ACTIVE | Noted: 2023-05-23

## 2023-05-23 PROBLEM — G89.29 CHRONIC RIGHT SHOULDER PAIN: Status: ACTIVE | Noted: 2023-05-23

## 2023-05-23 PROBLEM — F51.01 PRIMARY INSOMNIA: Status: ACTIVE | Noted: 2023-05-23

## 2023-05-23 NOTE — TELEPHONE ENCOUNTER
Patient informed that his EKG will must be done at the facility where the ordering provider is practicing so that the strip may be read immediately by that provider or at [facility] cardiology dept.  He verbalized understanding.  Appointment cancelled.

## 2023-05-24 DIAGNOSIS — E08.00 DIABETES MELLITUS DUE TO UNDERLYING CONDITION WITH HYPEROSMOLARITY WITHOUT COMA, WITHOUT LONG-TERM CURRENT USE OF INSULIN: Primary | ICD-10-CM

## 2023-05-24 RX ORDER — LINAGLIPTIN 5 MG/1
5 TABLET, FILM COATED ORAL DAILY
Qty: 90 TABLET | Refills: 0 | Status: SHIPPED | OUTPATIENT
Start: 2023-05-24 | End: 2023-09-19

## 2023-06-28 ENCOUNTER — TELEPHONE (OUTPATIENT)
Dept: NEUROLOGY | Facility: CLINIC | Age: 53
End: 2023-06-28
Payer: MEDICAID
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
## 2023-06-28 NOTE — TELEPHONE ENCOUNTER
----- Message from Misty Estrella RN sent at 6/28/2023 10:17 AM CDT -----  Regarding: FW: Appt  Contact: Pt [phone]    ----- Message -----  From: Stefanie Gavin  Sent: 5/31/2023  10:12 AM CDT  To: , #  Subject: Appt                                             Pt is calling to schedule appt, pt has a referral in the system please call

## 2023-09-18 DIAGNOSIS — E08.22 DIABETES MELLITUS DUE TO UNDERLYING CONDITION WITH STAGE 3 CHRONIC KIDNEY DISEASE, WITHOUT LONG-TERM CURRENT USE OF INSULIN, UNSPECIFIED WHETHER STAGE 3A OR 3B CKD: ICD-10-CM

## 2023-09-18 DIAGNOSIS — N18.30 DIABETES MELLITUS DUE TO UNDERLYING CONDITION WITH STAGE 3 CHRONIC KIDNEY DISEASE, WITHOUT LONG-TERM CURRENT USE OF INSULIN, UNSPECIFIED WHETHER STAGE 3A OR 3B CKD: ICD-10-CM

## 2023-09-18 RX ORDER — GLIMEPIRIDE 4 MG/1
4 TABLET ORAL
Qty: 90 TABLET | Refills: 0 | OUTPATIENT
Start: 2023-09-18 | End: 2024-09-17

## 2023-09-18 NOTE — TELEPHONE ENCOUNTER
----- Message from Hope Miles sent at 9/18/2023  3:12 PM CDT -----  Type: RX Refill Request    Who Called:  self     Have you contacted your pharmacy: no    Refill or New Rx: refill    RX Name and Strength: glimepiride (AMARYL) 4 MG tablet      Preferred Pharmacy with phone number: [id number]  [place], [place] - [address] [place] [address]   Phone:  [phone]  Fax:  [phone]    Local or Mail Order: local    Would the patient rather a call back or a response via [facility]?  call    Best Call Back Number: [phone] (home)      Additional Information:

## 2023-09-18 NOTE — TELEPHONE ENCOUNTER
No care due was identified.  [facility] Embedded Care Due Messages. Reference number: [id number].   9/18/2023 3:23:20 PM CDT

## 2023-09-20 ENCOUNTER — OFFICE VISIT (OUTPATIENT)
Dept: CARDIOLOGY | Facility: CLINIC | Age: 53
End: 2023-09-20
Payer: MEDICAID

## 2023-09-20 DIAGNOSIS — R07.9 CHEST PAIN IN ADULT: ICD-10-CM

## 2023-09-20 PROCEDURE — 4010F ACE/ARB THERAPY RXD/TAKEN: CPT | Mod: CPTII,,, | Performed by: INTERNAL MEDICINE

## 2023-09-20 PROCEDURE — 99024 PR POST-OP FOLLOW-UP VISIT: ICD-10-PCS | Mod: ,,, | Performed by: INTERNAL MEDICINE

## 2023-09-20 PROCEDURE — 99024 POSTOP FOLLOW-UP VISIT: CPT | Mod: ,,, | Performed by: INTERNAL MEDICINE

## 2023-09-20 PROCEDURE — 3066F NEPHROPATHY DOC TX: CPT | Mod: CPTII,,, | Performed by: INTERNAL MEDICINE

## 2023-09-20 PROCEDURE — 3060F POS MICROALBUMINURIA REV: CPT | Mod: CPTII,,, | Performed by: INTERNAL MEDICINE

## 2023-09-20 PROCEDURE — 3066F PR DOCUMENTATION OF TREATMENT FOR NEPHROPATHY: ICD-10-PCS | Mod: CPTII,,, | Performed by: INTERNAL MEDICINE

## 2023-09-20 PROCEDURE — 3046F PR MOST RECENT HEMOGLOBIN A1C LEVEL > 9.0%: ICD-10-PCS | Mod: CPTII,,, | Performed by: INTERNAL MEDICINE

## 2023-09-20 PROCEDURE — 3060F PR POS MICROALBUMINURIA RESULT DOCUMENTED/REVIEW: ICD-10-PCS | Mod: CPTII,,, | Performed by: INTERNAL MEDICINE

## 2023-09-20 PROCEDURE — 4010F PR ACE/ARB THEARPY RXD/TAKEN: ICD-10-PCS | Mod: CPTII,,, | Performed by: INTERNAL MEDICINE

## 2023-09-20 PROCEDURE — 3046F HEMOGLOBIN A1C LEVEL >9.0%: CPT | Mod: CPTII,,, | Performed by: INTERNAL MEDICINE

## 2023-09-27 DIAGNOSIS — E11.69 HYPERLIPIDEMIA ASSOCIATED WITH TYPE 2 DIABETES MELLITUS: Primary | ICD-10-CM

## 2023-09-27 DIAGNOSIS — E78.5 HYPERLIPIDEMIA ASSOCIATED WITH TYPE 2 DIABETES MELLITUS: Primary | ICD-10-CM

## 2023-09-27 RX ORDER — ROSUVASTATIN CALCIUM 40 MG/1
40 TABLET, COATED ORAL NIGHTLY
Qty: 90 TABLET | Refills: 3 | Status: SHIPPED | OUTPATIENT
Start: 2023-09-27 | End: 2024-03-21

## 2023-11-30 ENCOUNTER — OFFICE VISIT (OUTPATIENT)
Dept: ENDOCRINOLOGY | Facility: CLINIC | Age: 53
End: 2023-11-30
Payer: MEDICAID

## 2023-11-30 VITALS
HEART RATE: 93 BPM | WEIGHT: 228 LBS | SYSTOLIC BLOOD PRESSURE: 124 MMHG | TEMPERATURE: 98 F | BODY MASS INDEX: 30.08 KG/M2 | DIASTOLIC BLOOD PRESSURE: 74 MMHG

## 2023-11-30 DIAGNOSIS — N18.30 DIABETES MELLITUS DUE TO UNDERLYING CONDITION WITH STAGE 3 CHRONIC KIDNEY DISEASE, WITHOUT LONG-TERM CURRENT USE OF INSULIN, UNSPECIFIED WHETHER STAGE 3A OR 3B CKD: ICD-10-CM

## 2023-11-30 DIAGNOSIS — E08.22 DIABETES MELLITUS DUE TO UNDERLYING CONDITION WITH STAGE 3 CHRONIC KIDNEY DISEASE, WITHOUT LONG-TERM CURRENT USE OF INSULIN, UNSPECIFIED WHETHER STAGE 3A OR 3B CKD: ICD-10-CM

## 2023-11-30 DIAGNOSIS — E08.00 DIABETES MELLITUS DUE TO UNDERLYING CONDITION WITH HYPEROSMOLARITY WITHOUT COMA, WITHOUT LONG-TERM CURRENT USE OF INSULIN: Primary | ICD-10-CM

## 2023-11-30 DIAGNOSIS — I10 HYPERTENSION, UNSPECIFIED TYPE: ICD-10-CM

## 2023-11-30 DIAGNOSIS — R80.9 MICROALBUMINURIA: ICD-10-CM

## 2023-11-30 LAB — GLUCOSE SERPL-MCNC: 195 MG/DL (ref 70–110)

## 2023-11-30 PROCEDURE — 3078F DIAST BP <80 MM HG: CPT | Mod: CPTII,,, | Performed by: NURSE PRACTITIONER

## 2023-11-30 PROCEDURE — 3046F PR MOST RECENT HEMOGLOBIN A1C LEVEL > 9.0%: ICD-10-PCS | Mod: CPTII,,, | Performed by: NURSE PRACTITIONER

## 2023-11-30 PROCEDURE — 3060F POS MICROALBUMINURIA REV: CPT | Mod: CPTII,,, | Performed by: NURSE PRACTITIONER

## 2023-11-30 PROCEDURE — 1160F RVW MEDS BY RX/DR IN RCRD: CPT | Mod: CPTII,,, | Performed by: NURSE PRACTITIONER

## 2023-11-30 PROCEDURE — 1159F PR MEDICATION LIST DOCUMENTED IN MEDICAL RECORD: ICD-10-PCS | Mod: CPTII,,, | Performed by: NURSE PRACTITIONER

## 2023-11-30 PROCEDURE — 99214 OFFICE O/P EST MOD 30 MIN: CPT | Mod: PBBFAC | Performed by: NURSE PRACTITIONER

## 2023-11-30 PROCEDURE — 3046F HEMOGLOBIN A1C LEVEL >9.0%: CPT | Mod: CPTII,,, | Performed by: NURSE PRACTITIONER

## 2023-11-30 PROCEDURE — 1160F PR REVIEW ALL MEDS BY PRESCRIBER/CLIN PHARMACIST DOCUMENTED: ICD-10-PCS | Mod: CPTII,,, | Performed by: NURSE PRACTITIONER

## 2023-11-30 PROCEDURE — 99204 OFFICE O/P NEW MOD 45 MIN: CPT | Mod: S$PBB,,, | Performed by: NURSE PRACTITIONER

## 2023-11-30 PROCEDURE — 99999 PR PBB SHADOW E&M-EST. PATIENT-LVL IV: CPT | Mod: PBBFAC,,, | Performed by: NURSE PRACTITIONER

## 2023-11-30 PROCEDURE — 3008F BODY MASS INDEX DOCD: CPT | Mod: CPTII,,, | Performed by: NURSE PRACTITIONER

## 2023-11-30 PROCEDURE — 99999PBSHW POCT GLUCOSE, HAND-HELD DEVICE: Mod: PBBFAC,,,

## 2023-11-30 PROCEDURE — 99999PBSHW POCT GLUCOSE, HAND-HELD DEVICE: ICD-10-PCS | Mod: PBBFAC,,,

## 2023-11-30 PROCEDURE — 3074F SYST BP LT 130 MM HG: CPT | Mod: CPTII,,, | Performed by: NURSE PRACTITIONER

## 2023-11-30 PROCEDURE — 1159F MED LIST DOCD IN RCRD: CPT | Mod: CPTII,,, | Performed by: NURSE PRACTITIONER

## 2023-11-30 PROCEDURE — 4010F PR ACE/ARB THEARPY RXD/TAKEN: ICD-10-PCS | Mod: CPTII,,, | Performed by: NURSE PRACTITIONER

## 2023-11-30 PROCEDURE — 3066F NEPHROPATHY DOC TX: CPT | Mod: CPTII,,, | Performed by: NURSE PRACTITIONER

## 2023-11-30 PROCEDURE — 3074F PR MOST RECENT SYSTOLIC BLOOD PRESSURE < 130 MM HG: ICD-10-PCS | Mod: CPTII,,, | Performed by: NURSE PRACTITIONER

## 2023-11-30 PROCEDURE — 3078F PR MOST RECENT DIASTOLIC BLOOD PRESSURE < 80 MM HG: ICD-10-PCS | Mod: CPTII,,, | Performed by: NURSE PRACTITIONER

## 2023-11-30 PROCEDURE — 3066F PR DOCUMENTATION OF TREATMENT FOR NEPHROPATHY: ICD-10-PCS | Mod: CPTII,,, | Performed by: NURSE PRACTITIONER

## 2023-11-30 PROCEDURE — 3008F PR BODY MASS INDEX (BMI) DOCUMENTED: ICD-10-PCS | Mod: CPTII,,, | Performed by: NURSE PRACTITIONER

## 2023-11-30 PROCEDURE — 99999 PR PBB SHADOW E&M-EST. PATIENT-LVL IV: ICD-10-PCS | Mod: PBBFAC,,, | Performed by: NURSE PRACTITIONER

## 2023-11-30 PROCEDURE — 99204 PR OFFICE/OUTPT VISIT, NEW, LEVL IV, 45-59 MIN: ICD-10-PCS | Mod: S$PBB,,, | Performed by: NURSE PRACTITIONER

## 2023-11-30 PROCEDURE — 4010F ACE/ARB THERAPY RXD/TAKEN: CPT | Mod: CPTII,,, | Performed by: NURSE PRACTITIONER

## 2023-11-30 PROCEDURE — 3060F PR POS MICROALBUMINURIA RESULT DOCUMENTED/REVIEW: ICD-10-PCS | Mod: CPTII,,, | Performed by: NURSE PRACTITIONER

## 2023-11-30 PROCEDURE — 82962 GLUCOSE BLOOD TEST: CPT | Mod: PBBFAC | Performed by: NURSE PRACTITIONER

## 2023-11-30 RX ORDER — GLIMEPIRIDE 4 MG/1
4 TABLET ORAL
Qty: 90 TABLET | Refills: 0 | Status: SHIPPED | OUTPATIENT
Start: 2023-11-30

## 2023-11-30 RX ORDER — LINAGLIPTIN 5 MG/1
5 TABLET, FILM COATED ORAL DAILY
Qty: 90 TABLET | Refills: 0 | Status: SHIPPED | OUTPATIENT
Start: 2023-11-30

## 2023-11-30 RX ORDER — INSULIN PUMP SYRINGE, 3 ML
EACH MISCELLANEOUS
Qty: 1 EACH | Refills: 0 | Status: SHIPPED | OUTPATIENT
Start: 2023-11-30 | End: 2024-11-29

## 2023-11-30 RX ORDER — LANCETS
EACH MISCELLANEOUS
Qty: 200 EACH | Refills: 3 | Status: SHIPPED | OUTPATIENT
Start: 2023-11-30

## 2023-11-30 NOTE — Clinical Note
Adrien Jones, patient didn't know what the visit was for when he came and I believe that's why he was a bit resistant to recommendations. But I think he'll make some improvements by next visit. Thanks, Clover

## 2023-11-30 NOTE — PATIENT INSTRUCTIONS
A1C goal: <7%  Fasting/premeal blood glucose goal:   2 hour post-meal blood glucose goal: less than 180    Discussed progression of DM disease, long term complications, and tx options. Reviewed A1c and BG goals.   Avoid beverages with sugar.   Improve diet - avoid cookies/candy.   See Diabetes Education.     Patient not open taking injections. Discussed Rybelsus but pt declines today.   Start Tradjenta once daily.   Hold Jardiance for now d/t risk of  infections with significant hyperglycemia.     Test sugar 2x/day, fasting and bedtime. Bring meter /written sugar log to every visit.     Return to clinic in 3 months with labs prior.
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
## 2023-12-07 ENCOUNTER — CLINICAL SUPPORT (OUTPATIENT)
Dept: DIABETES | Facility: CLINIC | Age: 53
End: 2023-12-07
Payer: MEDICAID

## 2023-12-07 VITALS — BODY MASS INDEX: 30.09 KG/M2 | WEIGHT: 227 LBS | HEIGHT: 73 IN

## 2023-12-07 DIAGNOSIS — E08.00 DIABETES MELLITUS DUE TO UNDERLYING CONDITION WITH HYPEROSMOLARITY WITHOUT COMA, WITHOUT LONG-TERM CURRENT USE OF INSULIN: ICD-10-CM

## 2023-12-07 PROCEDURE — G0108 DIAB MANAGE TRN  PER INDIV: HCPCS | Mod: PBBFAC

## 2023-12-07 PROCEDURE — 99999 PR PBB SHADOW E&M-EST. PATIENT-LVL I: CPT | Mod: PBBFAC,,,

## 2023-12-07 PROCEDURE — 99999PBSHW PR PBB SHADOW TECHNICAL ONLY FILED TO HB: Mod: PBBFAC,,,

## 2023-12-07 PROCEDURE — 99999PBSHW PR PBB SHADOW TECHNICAL ONLY FILED TO HB: ICD-10-PCS | Mod: PBBFAC,,,

## 2023-12-07 PROCEDURE — 99999 PR PBB SHADOW E&M-EST. PATIENT-LVL I: ICD-10-PCS | Mod: PBBFAC,,,

## 2023-12-07 PROCEDURE — 99211 OFF/OP EST MAY X REQ PHY/QHP: CPT | Mod: PBBFAC

## 2023-12-07 NOTE — PROGRESS NOTES
"Diabetes Care Specialist Progress Note  Author: Tavia Diaz RN  Date: 12/7/2023    Program Intake  Reason for Diabetes Program Visit:: Initial Diabetes Assessment  Current diabetes risk level:: high  In the last 12 months, have you:: used emergency room services  Was the ER or hospital admission related to diabetes?: No  Permission to speak with others about care:: yes    Lab Results   Component Value Date    HGBA1C 12.2 (H) 09/26/2023       Clinical  Weight: 103 kg (227 lb)   Height: 6' 1" (185.4 cm)   Body mass index is 29.95 kg/m².    Clinical Assessment  Current Diabetes Treatment: Oral Medication (Glimepiride 4mg daily w/breakfast and Tradjenta 5mg daily)  Have you ever experienced hypoglycemia (low blood sugar)?: no  Have you ever experienced hyperglycemia (high blood sugar)?: no    Medication Information  How do you obtain your medications?: Patient drives  How many days a week do you miss your medications?: 3 or more (Pt said he not taking Tradjenta (but he has picked it up from pharmacy))  Do you sometimes have difficulty refilling your medications?: No  Medication adherence impacting ability to self-manage diabetes?: No    Labs  Do you have regular lab work to monitor your medications?: Yes  Type of Regular Lab Work: A1c  Where do you get your labs drawn?: [facility]  Lab Compliance Barriers: No    Nutritional Status  Diet: Regular  Meal Plan 24 Hour Recall: Breakfast, Lunch, Dinner, Snack  Meal Plan 24 Hour Recall - Breakfast: amador,egg,cheese sandwich  Meal Plan 24 Hour Recall - Lunch: amador,egg,cheese sandwich with a sprite  Meal Plan 24 Hour Recall - Dinner: 12 nuggets, fries and a sprite or red beans/rice  Meal Plan 24 Hour Recall - Snack: chip-ahoy cookies, or pie, or chips  Change in appetite?: No  Recent Changes in Weight: No Recent Weight Change  Current nutritional status an area of need that is impacting patient's ability to self-manage diabetes?: No    Additional Social History  Support  Does " anyone support you with your diabetes care?: yes  Who takes you to your medical appointments?: self  Does the current support meet the patient's needs?: Yes  Is Support an area impacting ability to self-manage diabetes?: No    Access to Mass Media & Technology  Does the patient have access to any of the following devices or technologies?: Smart phone  Media or technology needs impacting ability to self-manage diabetes?: No    Cognitive/Behavioral Health  Alert and Oriented: Yes  Difficulty Thinking: No  Requires Prompting: No  Requires assistance for routine expression?: No  Cognitive or behavioral barriers impacting ability to self-manage diabetes?: No    Culture/Yazidi  Culture or Roman Catholic beliefs that may impact ability to access healthcare: No    Communication  Language preference: English  Hearing Problems: No  Vision Problems: Yes  Vision problem type:: Decreased Vision  Vision Assistance: Glasses  Communication needs impacting ability to self-manage diabetes?: No    Health Literacy  Preferred Learning Method: Face to Face, Reading Materials  How often do you need to have someone help you read instructions, pamphlets, or written material from your doctor or pharmacy?: Never  Health literacy needs impacting ability to self-manage diabetes?: No      Diabetes Self-Management Skills Assessment  Diabetes Disease Process/Treatment Options  Patient/caregiver able to state what happens when someone has diabetes.: somewhat  Patient/caregiver knows what type of diabetes they have.: yes  Diabetes Type : Type II  Patient/caregiver able to identify at least three signs and symptoms of diabetes.: no  Patient able to identify at least three risk factors for diabetes.: no  Diabetes Disease Process/Treatment Options: Skills Assessment Completed: Yes  Assessment indicates:: Knowledge deficit, Instruction Needed  Area of need?: Yes    Nutrition/Healthy Eating  Challenges to healthy eating:: portion control  Method of  carbohydrate measurement:: no method  Patient can identify foods that impact blood sugar.: yes  Patient-identified foods:: soda, starches (bread, pasta, rice, cereal), sweets  Nutrition/Healthy Eating Skills Assessment Completed:: Yes  Assessment indicates:: Instruction Needed, Knowledge deficit  Area of need?: Yes    Physical Activity/Exercise  Physical Activity/Exercise Skills Assessment Completed: : No  Deffered due to:: Time    Medications  Patient is able to describe current diabetes management routine.: yes  Diabetes management routine:: diet, oral medications  Patient is able to identify current diabetes medications, dosages, and appropriate timing of medications.: yes  Patient understands the purpose of the medications taken for diabetes.: no  Patient reports problems or concerns with current medication regimen.: no  Medication Skills Assessment Completed:: Yes  Assessment indicates:: Instruction Needed, Knowledge deficit  Area of need?: Yes    Home Blood Glucose Monitoring  Patient states that blood sugar is checked at home daily.: no  Reasons for not monitoring:: other (see comments) (Pt need to  glucometer from the pharmacy)  Home Blood Glucose Monitoring Skills Assessment Completed: : Yes  Assessment indicates:: Instruction Needed, Knowledge deficit  Area of need?: Yes    Acute Complications  Acute Complications Skills Assessment Completed: : No  Deffered due to:: Time    Chronic Complications  Chronic Complications Skills Assessment Completed: : No  Deferred due to:: Time    Psychosocial/Coping  Psychosocial/Coping Skills Assessment Completed: : No  Deffered due to:: Time      Assessment Summary and Plan    Based on today's diabetes care assessment, the following areas of need were identified:          12/7/2023    12:01 AM   Social   Support No   Access to Mass Media/Tech No   Cognitive/Behavioral Health No   Culture/Hoahaoism No   Communication No   Health Literacy No            12/7/2023     12:01 AM   Clinical   Medication Adherence No   Lab Compliance No   Nutritional Status No            12/7/2023    12:01 AM   Diabetes Self-Management Skills   Diabetes Disease Process/Treatment Options Yes- Provided DM management guide and discussed what is diabetes and the significance of current A1c.    Nutrition/Healthy Eating Yes- see care planning   Medication Yes- Pt to start taking his Tradjenta as RX. Reviewed Patient's current medication regimen. MOA reviewed including common side effects.   Home Blood Glucose Monitoring Yes- Discussed BS goals and importance of monitoring and recording BS for review and maintenance of medication. Pt to test daily or at least 3X's week and bring BS log to next visit. Pt verbalized understanding.       Today's interventions were provided through individual discussion, instruction, and written materials were provided.      Patient verbalized understanding of instruction and written materials.  Pt was able to return back demonstration of instructions today. Patient understood key points, needs reinforcement and further instruction.     Diabetes Self-Management Care Plan:    Today's Diabetes Self-Management Care Plan was developed with Jarrod's input. Jarrod has agreed to work toward the following goal(s) to improve his/her overall diabetes control.      Care Plan: Diabetes Management   Updates made since 11/7/2023 12:00 AM        Problem: Healthy Eating         Goal: Eat 2-3 meals daily with 30-45g/2-3 servings of Carbohydrate per meal. Limit snacking in between meal to 1 serving (15 grams).    Start Date: 12/7/2023   Expected End Date: 12/28/2023   Priority: High   Barriers: No Barriers Identified   Note:    12/7/23 - - We concentrated on portion sizes at today's session. Overall meal planning and various choices for meals. Discussed carb vs non-carb foods, appropriate amount of carbs to have at meals/snacks and acceptable serving sizes of individual carb items. Obtained a  24-hr food recall from patient. Discussed various meal plan options to promote healthy eating. Discussed the importance of avoiding sugary drinks and replace with non-caloric drinks. Pt verbalized understanding.     - - Practiced reading food labels on various food items with patient focusing on serving size and total carbohydrate intake (not sugar intake). Instructed on appropriate serving sizes of specific carb containing foods. Discussed having lean protein at all meals and adding non starchy vegetables at lunch and dinner. Instructed pt to aim for 2-3 evenly spaced meals or a small carb snack in place of a missed meal. Written education information provided to patient for use at home. Pt verbalized understanding of all the above.       Task: Provided visual examples using dry measuring cups, food models, and other familiar objects such as computer mouse, deck or cards, tennis ball etc. to help with visualization of portions. Completed 12/7/2023        Task: Explained how to count carbohydrates using the food label and the use of dry measuring cups for accurate carb counting. Completed 12/7/2023     Task: Recommended replacing beverages containing high sugar content with noncaloric/sugar free options and/or water. Completed 12/7/2023        Task: Review the importance of balancing carbohydrates with each meal using portion control techniques to count servings of carbohydrate and label reading to identify serving size and amount of total carbs per serving. Completed 12/7/2023        Task: Provided Sample plate method and reviewed the use of the plate to estimate amounts of carbohydrate per meal. Completed 12/7/2023          Follow Up Plan     Follow up in about 3 weeks (around 12/28/2023) for F/U #1 review BS log and meal planning.    Today's care plan and follow up schedule was discussed with patient.  Jarrod verbalized understanding of the care plan, goals, and agrees to follow up plan.        The patient was  encouraged to communicate with his/her health care provider/physician and care team regarding his/her condition(s) and treatment.  I provided the patient with my contact information today and encouraged to contact me via phone or [facility] Patient Portal as needed.     Length of Visit   Total Time: 60 Minutes

## 2023-12-28 ENCOUNTER — CLINICAL SUPPORT (OUTPATIENT)
Dept: DIABETES | Facility: CLINIC | Age: 53
End: 2023-12-28
Payer: MEDICAID

## 2023-12-28 VITALS — HEIGHT: 73 IN | WEIGHT: 226.19 LBS | BODY MASS INDEX: 29.98 KG/M2

## 2023-12-28 DIAGNOSIS — E08.00 DIABETES MELLITUS DUE TO UNDERLYING CONDITION WITH HYPEROSMOLARITY WITHOUT COMA, WITHOUT LONG-TERM CURRENT USE OF INSULIN: Primary | ICD-10-CM

## 2023-12-28 PROCEDURE — 99999PBSHW PR PBB SHADOW TECHNICAL ONLY FILED TO HB: ICD-10-PCS | Mod: PBBFAC,,,

## 2023-12-28 PROCEDURE — 99999PBSHW PR PBB SHADOW TECHNICAL ONLY FILED TO HB: Mod: PBBFAC,,,

## 2023-12-28 PROCEDURE — G0108 DIAB MANAGE TRN  PER INDIV: HCPCS | Mod: PBBFAC

## 2023-12-28 NOTE — PROGRESS NOTES
"Diabetes Care Specialist Progress Note  Author: Tavia Diaz RN  Date: 12/28/2023    Program Intake  Reason for Diabetes Program Visit:: Intervention  Type of Intervention:: Individual  Individual: Education  Education: Self-Management Skill Review, Nutrition and Meal Planning  Current diabetes risk level:: high  In the last 12 months, have you:: used emergency room services  Was the ER or hospital admission related to diabetes?: No  Permission to speak with others about care:: yes    Lab Results   Component Value Date    HGBA1C 12.2 (H) 09/26/2023       Clinical  Weight: 102.6 kg (226 lb 3.2 oz)   Height: 6' 1" (185.4 cm)   Body mass index is 29.84 kg/m².    Clinical Assessment  Current Diabetes Treatment: Oral Medication (Tradjenta 5mg daily and Glimepiride 4mg daily w/breakfast)  Have you ever experienced hypoglycemia (low blood sugar)?: no  Have you ever experienced hyperglycemia (high blood sugar)?: no    Medication Information  How do you obtain your medications?: Patient drives  How many days a week do you miss your medications?: 3 or more (Pt haven't been taking his Tradjenta; discussed importance of taking meds as prescribed. Pt agreed to start Tradjenta as RX.)  Do you sometimes have difficulty refilling your medications?: No  Medication adherence impacting ability to self-manage diabetes?: No    Labs  Do you have regular lab work to monitor your medications?: Yes  Type of Regular Lab Work: A1c  Where do you get your labs drawn?: [facility]  Lab Compliance Barriers: No    Nutritional Status  Diet: Regular  Meal Plan 24 Hour Recall: Breakfast, Lunch, Dinner  Meal Plan 24 Hour Recall - Breakfast: [employer] meal w/sprite  Meal Plan 24 Hour Recall - Lunch: amador,egg,cheese sandwich with a sprite  Meal Plan 24 Hour Recall - Dinner: 1/2 Pizza  Change in appetite?: No  Recent Changes in Weight: No Recent Weight Change  Current nutritional status an area of need that is impacting patient's ability to " self-manage diabetes?: Yes    Additional Social History  Support  Does anyone support you with your diabetes care?: yes  Who supports you?: self  Who takes you to your medical appointments?: self  Does the current support meet the patient's needs?: Yes  Is Support an area impacting ability to self-manage diabetes?: No    Access to Mass Media & Technology  Does the patient have access to any of the following devices or technologies?: Smart phone  Media or technology needs impacting ability to self-manage diabetes?: No    Cognitive/Behavioral Health  Alert and Oriented: Yes  Difficulty Thinking: No  Requires Prompting: No  Requires assistance for routine expression?: No  Cognitive or behavioral barriers impacting ability to self-manage diabetes?: No    Culture/Congregation  Culture or Scientologist beliefs that may impact ability to access healthcare: No    Communication  Language preference: English  Hearing Problems: No  Vision Problems: Yes  Vision problem type:: Decreased Vision  Vision Assistance: Glasses  Communication needs impacting ability to self-manage diabetes?: No    Health Literacy  Preferred Learning Method: Face to Face, Reading Materials  How often do you need to have someone help you read instructions, pamphlets, or written material from your doctor or pharmacy?: Never  Health literacy needs impacting ability to self-manage diabetes?: No      Diabetes Self-Management Skills Assessment  Diabetes Disease Process/Treatment Options  Patient/caregiver able to state what happens when someone has diabetes.: somewhat  Patient/caregiver knows what type of diabetes they have.: yes  Diabetes Type : Type II  Patient/caregiver able to identify at least three signs and symptoms of diabetes.: no  Patient able to identify at least three risk factors for diabetes.: no  Diabetes Disease Process/Treatment Options: Skills Assessment Completed: Yes  Assessment indicates:: Instruction Needed  Area of need?:  No    Nutrition/Healthy Eating  Challenges to healthy eating:: portion control  Method of carbohydrate measurement:: eyeballing/guessing  Patient can identify foods that impact blood sugar.: yes  Patient-identified foods:: soda, starches (bread, pasta, rice, cereal), sweets  Nutrition/Healthy Eating Skills Assessment Completed:: Yes  Assessment indicates:: Instruction Needed, Knowledge deficit  Area of need?: Yes    Physical Activity/Exercise  Patient's daily activity level:: moderately active  Patient formally exercises outside of work.: no  Reasons for not exercising:: other (see comments) (Pt's job is [employer])  Patient can identify forms of physical activity.: yes  Stated forms of physical activity:: any movement performed by muscles that uses energy  Patient can identify reasons why exercise/physical activity is important in diabetes management.: no  Physical Activity/Exercise Skills Assessment Completed: : Yes  Assessment indicates:: Instruction Needed, Knowledge deficit  Area of need?: Yes    Medications  Patient is able to describe current diabetes management routine.: yes  Diabetes management routine:: diet, oral medications  Patient is able to identify current diabetes medications, dosages, and appropriate timing of medications.: yes  Patient understands the purpose of the medications taken for diabetes.: no  Patient reports problems or concerns with current medication regimen.: no  Medication Skills Assessment Completed:: Yes  Assessment indicates:: Instruction Needed, Knowledge deficit  Area of need?: Yes    Home Blood Glucose Monitoring  Patient states that blood sugar is checked at home daily.: no  Reasons for not monitoring:: other (see comments) (Pt says he has trouble getting blood from finger; discussed various techniques to get blood from finger. Pt was able to test BS in the office.)  Home Blood Glucose Monitoring Skills Assessment Completed: : Yes  Assessment indicates:: Instruction Needed,  Knowledge deficit  Area of need?: Yes    Acute Complications  Patient is able to identify types of acute complications: No  Acute Complications Skills Assessment Completed: : Yes  Assessment indicates:: Instruction Needed, Knowledge deficit  Area of need?: Yes    Chronic Complications  Chronic Complications Skills Assessment Completed: : No  Deferred due to:: Time    Psychosocial/Coping  Psychosocial/Coping Skills Assessment Completed: : No  Area of need?: No      Assessment Summary and Plan    Based on today's diabetes care assessment, the following areas of need were identified:          12/28/2023    12:01 AM   Social   Support No   Access to Mass Media/Tech No   Cognitive/Behavioral Health No   Culture/Yarsanism No   Communication No   Health Literacy No            12/28/2023    12:01 AM   Clinical   Medication Adherence No   Lab Compliance No   Nutritional Status Yes            12/28/2023    12:01 AM   Diabetes Self-Management Skills   Diabetes Disease Process/Treatment Options No   Nutrition/Healthy Eating Yes- see care planning   Physical Activity/Exercise Yes- Pt to increase exercise as tolerated. Discussed benefits of exercise as it relates to insulin resistance and weight loss.   Medication Yes- Reviewed Patient's current medication regimen. MOA reviewed including common side effects. Discussed the importance of taking medications as prescribed.    Home Blood Glucose Monitoring Yes- Discussed BS goals and importance of monitoring and recording BS for review and maintenance of medication.   Acute Complications Yes- Discussed prevention, detection, signs and symptoms, and treatment of hypoglycemia following rule of 15.    Psychosocial/Coping No      Today's interventions were provided through individual discussion, instruction, and written materials were provided.      Patient verbalized understanding of instruction and written materials.  Pt was able to return back demonstration of instructions today.  Patient understood key points, needs reinforcement and further instruction.     Diabetes Self-Management Care Plan:    Today's Diabetes Self-Management Care Plan was developed with Jarrod's input. Jarrod has agreed to work toward the following goal(s) to improve his/her overall diabetes control.      Care Plan: Diabetes Management   Updates made since 11/28/2023 12:00 AM        Problem: Healthy Eating         Goal: Eat 2-3 meals daily with 30-45g/2-3 servings of Carbohydrate per meal. Limit snacking in between meal to 1 serving (15 grams).    Start Date: 12/7/2023   Expected End Date: 12/28/2023   This Visit's Progress: No change   Priority: High   Barriers: No Barriers Identified   Note:    12/7/23 - - We concentrated on portion sizes at today's session. Overall meal planning and various choices for meals. Discussed carb vs non-carb foods, appropriate amount of carbs to have at meals/snacks and acceptable serving sizes of individual carb items. Obtained a 24-hr food recall from patient. Discussed various meal plan options to promote healthy eating. Discussed the importance of avoiding sugary drinks and replace with non-caloric drinks. Pt verbalized understanding.     - - Practiced reading food labels on various food items with patient focusing on serving size and total carbohydrate intake (not sugar intake). Instructed on appropriate serving sizes of specific carb containing foods. Discussed having lean protein at all meals and adding non starchy vegetables at lunch and dinner. Instructed pt to aim for 2-3 evenly spaced meals or a small carb snack in place of a missed meal. Written education information provided to patient for use at home. Pt verbalized understanding of all the above.      Care Plan Update 12/28/23 - - Pt hasn't been closely monitoring his carb intake. We reviewed carb sources of foods and appropriate serving sizes of carbs at meal and snack time. Pt says he still eat fast foods; we discussed  choosing healthier choices when eating fast foods. Discussed having lean protein at all meals and non starchy vegetables at lunch and dinner. Instructed pt to aim for 3 evenly spaced meals or a small carb snack in place of a missed meal. Pt verbalized understanding of all the above.      Task: Provided visual examples using dry measuring cups, food models, and other familiar objects such as computer mouse, deck or cards, tennis ball etc. to help with visualization of portions. Completed 12/7/2023        Task: Explained how to count carbohydrates using the food label and the use of dry measuring cups for accurate carb counting. Completed 12/7/2023     Task: Recommended replacing beverages containing high sugar content with noncaloric/sugar free options and/or water. Completed 12/7/2023        Task: Review the importance of balancing carbohydrates with each meal using portion control techniques to count servings of carbohydrate and label reading to identify serving size and amount of total carbs per serving. Completed 12/7/2023        Task: Provided Sample plate method and reviewed the use of the plate to estimate amounts of carbohydrate per meal. Completed 12/7/2023        Follow Up Plan     Follow up in about 8 weeks (around 2/20/2024) for F/U BS readings and meal planning.    Today's care plan and follow up schedule was discussed with patient.  Jarrod verbalized understanding of the care plan, goals, and agrees to follow up plan.        The patient was encouraged to communicate with his/her health care provider/physician and care team regarding his/her condition(s) and treatment.  I provided the patient with my contact information today and encouraged to contact me via phone or [facility] Patient Portal as needed.     Length of Visit   Total Time: 60 Minutes

## 2024-02-23 ENCOUNTER — HOSPITAL ENCOUNTER (EMERGENCY)
Facility: HOSPITAL | Age: 54
Discharge: ANOTHER HOSPITAL | End: 2024-02-23
Attending: EMERGENCY MEDICINE
Payer: MEDICAID

## 2024-02-23 VITALS
BODY MASS INDEX: 29.03 KG/M2 | DIASTOLIC BLOOD PRESSURE: 92 MMHG | OXYGEN SATURATION: 96 % | WEIGHT: 220 LBS | RESPIRATION RATE: 18 BRPM | SYSTOLIC BLOOD PRESSURE: 155 MMHG | TEMPERATURE: 98 F | HEART RATE: 59 BPM

## 2024-02-23 DIAGNOSIS — T07.XXXA MULTIPLE LACERATIONS: ICD-10-CM

## 2024-02-23 DIAGNOSIS — S61.011A LACERATION OF RIGHT THUMB WITHOUT FOREIGN BODY WITHOUT DAMAGE TO NAIL, INITIAL ENCOUNTER: ICD-10-CM

## 2024-02-23 DIAGNOSIS — S62.624B OPEN DISPLACED FRACTURE OF MIDDLE PHALANX OF RIGHT RING FINGER, INITIAL ENCOUNTER: Primary | ICD-10-CM

## 2024-02-23 DIAGNOSIS — S62.654B OPEN NONDISPLACED FRACTURE OF MIDDLE PHALANX OF RIGHT RING FINGER, INITIAL ENCOUNTER: ICD-10-CM

## 2024-02-23 DIAGNOSIS — Z01.818 PREOPERATIVE CLEARANCE: ICD-10-CM

## 2024-02-23 LAB
ALBUMIN SERPL BCP-MCNC: 4.2 G/DL (ref 3.5–5.2)
ALP SERPL-CCNC: 74 U/L (ref 55–135)
ALT SERPL W/O P-5'-P-CCNC: 19 U/L (ref 10–44)
ANION GAP SERPL CALC-SCNC: 8 MMOL/L (ref 8–16)
AST SERPL-CCNC: 17 U/L (ref 10–40)
BACTERIA #/AREA URNS HPF: ABNORMAL /HPF
BASOPHILS # BLD AUTO: 0.02 K/UL (ref 0–0.2)
BASOPHILS NFR BLD: 0.4 % (ref 0–1.9)
BILIRUB SERPL-MCNC: 0.7 MG/DL (ref 0.1–1)
BILIRUB UR QL STRIP: NEGATIVE
BUN SERPL-MCNC: 15 MG/DL (ref 6–20)
CALCIUM SERPL-MCNC: 9.4 MG/DL (ref 8.7–10.5)
CHLORIDE SERPL-SCNC: 104 MMOL/L (ref 95–110)
CLARITY UR: CLEAR
CO2 SERPL-SCNC: 23 MMOL/L (ref 23–29)
COLOR UR: YELLOW
CREAT SERPL-MCNC: 1.8 MG/DL (ref 0.5–1.4)
DIFFERENTIAL METHOD BLD: ABNORMAL
EOSINOPHIL # BLD AUTO: 0.1 K/UL (ref 0–0.5)
EOSINOPHIL NFR BLD: 0.9 % (ref 0–8)
ERYTHROCYTE [DISTWIDTH] IN BLOOD BY AUTOMATED COUNT: 11.6 % (ref 11.5–14.5)
EST. GFR  (NO RACE VARIABLE): 44 ML/MIN/1.73 M^2
GLUCOSE SERPL-MCNC: 351 MG/DL (ref 70–110)
GLUCOSE UR QL STRIP: ABNORMAL
HCT VFR BLD AUTO: 38.2 % (ref 40–54)
HGB BLD-MCNC: 12.4 G/DL (ref 14–18)
HGB UR QL STRIP: NEGATIVE
HYALINE CASTS #/AREA URNS LPF: 5 /LPF
IMM GRANULOCYTES # BLD AUTO: 0.01 K/UL (ref 0–0.04)
IMM GRANULOCYTES NFR BLD AUTO: 0.2 % (ref 0–0.5)
KETONES UR QL STRIP: NEGATIVE
LEUKOCYTE ESTERASE UR QL STRIP: NEGATIVE
LYMPHOCYTES # BLD AUTO: 2.1 K/UL (ref 1–4.8)
LYMPHOCYTES NFR BLD: 37.6 % (ref 18–48)
MCH RBC QN AUTO: 28.1 PG (ref 27–31)
MCHC RBC AUTO-ENTMCNC: 32.5 G/DL (ref 32–36)
MCV RBC AUTO: 86 FL (ref 82–98)
MICROSCOPIC COMMENT: ABNORMAL
MONOCYTES # BLD AUTO: 0.4 K/UL (ref 0.3–1)
MONOCYTES NFR BLD: 6.9 % (ref 4–15)
NEUTROPHILS # BLD AUTO: 3 K/UL (ref 1.8–7.7)
NEUTROPHILS NFR BLD: 54 % (ref 38–73)
NITRITE UR QL STRIP: NEGATIVE
NRBC BLD-RTO: 0 /100 WBC
PH UR STRIP: 6 [PH] (ref 5–8)
PLATELET # BLD AUTO: 142 K/UL (ref 150–450)
PMV BLD AUTO: 11.7 FL (ref 9.2–12.9)
POTASSIUM SERPL-SCNC: 3.7 MMOL/L (ref 3.5–5.1)
PROT SERPL-MCNC: 7.2 G/DL (ref 6–8.4)
PROT UR QL STRIP: NEGATIVE
RBC # BLD AUTO: 4.42 M/UL (ref 4.6–6.2)
SODIUM SERPL-SCNC: 135 MMOL/L (ref 136–145)
SP GR UR STRIP: 1.01 (ref 1–1.03)
URN SPEC COLLECT METH UR: ABNORMAL
UROBILINOGEN UR STRIP-ACNC: NEGATIVE EU/DL
WBC # BLD AUTO: 5.48 K/UL (ref 3.9–12.7)
WBC #/AREA URNS HPF: 1 /HPF (ref 0–5)
YEAST URNS QL MICRO: ABNORMAL

## 2024-02-23 PROCEDURE — 96375 TX/PRO/DX INJ NEW DRUG ADDON: CPT

## 2024-02-23 PROCEDURE — 85025 COMPLETE CBC W/AUTO DIFF WBC: CPT | Performed by: EMERGENCY MEDICINE

## 2024-02-23 PROCEDURE — 93005 ELECTROCARDIOGRAM TRACING: CPT

## 2024-02-23 PROCEDURE — 25000003 PHARM REV CODE 250: Performed by: EMERGENCY MEDICINE

## 2024-02-23 PROCEDURE — 99285 EMERGENCY DEPT VISIT HI MDM: CPT | Mod: 25

## 2024-02-23 PROCEDURE — 93010 ELECTROCARDIOGRAM REPORT: CPT | Mod: ,,, | Performed by: INTERNAL MEDICINE

## 2024-02-23 PROCEDURE — 81000 URINALYSIS NONAUTO W/SCOPE: CPT | Performed by: EMERGENCY MEDICINE

## 2024-02-23 PROCEDURE — 96376 TX/PRO/DX INJ SAME DRUG ADON: CPT

## 2024-02-23 PROCEDURE — 96365 THER/PROPH/DIAG IV INF INIT: CPT

## 2024-02-23 PROCEDURE — 63600175 PHARM REV CODE 636 W HCPCS: Performed by: EMERGENCY MEDICINE

## 2024-02-23 PROCEDURE — 80053 COMPREHEN METABOLIC PANEL: CPT | Performed by: EMERGENCY MEDICINE

## 2024-02-23 RX ORDER — HYDROMORPHONE HYDROCHLORIDE 1 MG/ML
1 INJECTION, SOLUTION INTRAMUSCULAR; INTRAVENOUS; SUBCUTANEOUS
Status: COMPLETED | OUTPATIENT
Start: 2024-02-23 | End: 2024-02-23

## 2024-02-23 RX ORDER — CEFAZOLIN SODIUM 1 G/50ML
1 SOLUTION INTRAVENOUS
Status: COMPLETED | OUTPATIENT
Start: 2024-02-23 | End: 2024-02-23

## 2024-02-23 RX ADMIN — CEFAZOLIN SODIUM 1 G: 1 SOLUTION INTRAVENOUS at 04:02

## 2024-02-23 RX ADMIN — HYDROMORPHONE HYDROCHLORIDE 1 MG: 1 INJECTION, SOLUTION INTRAMUSCULAR; INTRAVENOUS; SUBCUTANEOUS at 04:02

## 2024-02-23 RX ADMIN — SODIUM CHLORIDE 1000 ML: 9 INJECTION, SOLUTION INTRAVENOUS at 04:02

## 2024-02-23 RX ADMIN — HYDROMORPHONE HYDROCHLORIDE 1 MG: 1 INJECTION, SOLUTION INTRAMUSCULAR; INTRAVENOUS; SUBCUTANEOUS at 07:02

## 2024-02-23 NOTE — ED TRIAGE NOTES
"Pt to the ED by POV with complaints of injury/lac to every finger on right hand. Pt reports he went to fix his jammed  PTA and "the pressure released and sliced across the top of his fingers". Pt presents with hand in towel, bleeding controlled. Pt reports "I think my middle finger is dangling there, hanging on by a thread". Pt AAOx4.  "

## 2024-02-23 NOTE — ED PROVIDER NOTES
"Encounter Date: 2/23/2024    SCRIBE #1 NOTE: I, Tami Rubi, am scribing for, and in the presence of,  Omar Hunter MD. I have scribed the following portions of the note - Other sections scribed: HPI, ROS.       History     Chief Complaint   Patient presents with    Finger Injury     Pt to the ED by POV with complaints of injury/lac to every finger on right hand. Pt reports he went to fix his jammed  PTA and "the pressure released and sliced across the top of his fingers". Pt presents with hand in towel, bleeding controlled. Pt reports "I think my middle finger is dangling there, hanging on by a thread". Pt AAOx4.     This is a 53 year old male, with a PMHx of DM, HLD, and HTN, who presents to the ED complaining of Laceration to Right Hand, symptoms onset one hour PTA. Patient reports laceration to right middle finger, index finger, and thumb. Patient states he went to fix his jammed  and "the pressure released and sliced across the top of my fingers". Patient also states his last Tetanus immunization was a few years ago. Patient denies cough, shortness of breath, chest pain, fever, chills, abdominal pain, nausea, vomiting, diarrhea, dysuria, headaches, congestion, sore throat, arm or leg trouble, eye pain, ear pain, rash, or other associated symptoms. No other alleviating or exacerbating factors. This is the extent of the patient's complaints in the ED. NKDA.                The history is provided by the patient. No  was used.     Review of patient's allergies indicates:  No Known Allergies  Past Medical History:   Diagnosis Date    Diabetes mellitus     Diabetes mellitus type I     Hyperlipidemia associated with type 2 diabetes mellitus 9/27/2023    Hypertension      Past Surgical History:   Procedure Laterality Date    COLONOSCOPY  01/28/2022    COLONOSCOPY N/A 01/28/2022    Procedure: COLONOSCOPY;  Surgeon: Don Mon MD;  Location: [facility];  Service: " Endoscopy;  Laterality: N/A;    right knee       Family History   Problem Relation Age of Onset    Kidney disease Mother     Hypertension Father     Diabetes type II Father      Social History     Tobacco Use    Smoking status: Never    Smokeless tobacco: Never   Substance Use Topics    Alcohol use: Yes     Comment: occasionally    Drug use: No     Review of Systems   Constitutional:  Negative for chills, diaphoresis and fever.   HENT:  Negative for congestion, ear pain and sore throat.    Eyes:  Negative for pain and visual disturbance.   Respiratory:  Negative for cough and shortness of breath.    Cardiovascular:  Negative for chest pain.   Gastrointestinal:  Negative for abdominal pain, diarrhea, nausea and vomiting.   Genitourinary:  Negative for dysuria.   Musculoskeletal:  Negative for myalgias.   Skin:  Negative for rash.        (+) laceration to right middle finger, index finger, and thumb   Neurological:  Negative for headaches.       Physical Exam     Initial Vitals   BP Pulse Resp Temp SpO2   02/23/24 1601 02/23/24 1601 02/23/24 1601 02/23/24 1603 02/23/24 1601   (!) 170/96 74 18 97.6 °F (36.4 °C) 100 %      MAP       --                Physical Exam  The patient was examined specifically for the following:   General:No significant distress, Good color, Warm and dry. Head and neck:Scalp atraumatic, Neck supple. Neurological:Appropriate conversation, Gross motor deficits. Eyes:Conjugate gaze, Clear corneas. ENT: No epistaxis. Cardiac: Regular rate and rhythm, Grossly normal heart tones. Pulmonary: Wheezing, Rales. Gastrointestinal: Abdominal tenderness, Abdominal distention. Musculoskeletal: Extremity deformity, Apparent pain with range of motion of the joints. Skin: Rash.   The findings on examination were normal except for the following:  The patient has a full-thickness laceration of the right middle finger at the PIP joint dorsally.  The joint appears to be unstable.  I am suspicious of open fracture.   There is a 1.5 cm flap laceration of the dorsum of the D IP of the right index finger some superficial lacerations to the distal thumb, right hand.              ED Course   Procedures  Labs Reviewed   COMPREHENSIVE METABOLIC PANEL - Abnormal; Notable for the following components:       Result Value    Sodium 135 (*)     Glucose 351 (*)     Creatinine 1.8 (*)     eGFR 44 (*)     All other components within normal limits   CBC W/ AUTO DIFFERENTIAL - Abnormal; Notable for the following components:    RBC 4.42 (*)     Hemoglobin 12.4 (*)     Hematocrit 38.2 (*)     Platelets 142 (*)     All other components within normal limits   URINALYSIS, REFLEX TO URINE CULTURE - Abnormal; Notable for the following components:    Glucose, UA 4+ (*)     All other components within normal limits    Narrative:     Specimen Source->Urine   URINALYSIS MICROSCOPIC - Abnormal; Notable for the following components:    Hyaline Casts, UA 5 (*)     All other components within normal limits    Narrative:     Specimen Source->Urine          Imaging Results              X-Ray Chest 1 View (Final result)  Result time 02/23/24 17:19:49      Final result by Bryan Coats MD (02/23/24 17:19:49)                   Impression:      No detrimental change or radiographic acute intrathoracic process seen.      Electronically signed by: Bryan Coats MD  Date:    02/23/2024  Time:    17:19               Narrative:    EXAMINATION:  XR CHEST 1 VIEW    CLINICAL HISTORY:  Encounter for other preprocedural examination    TECHNIQUE:  Single frontal view of the chest was performed.    COMPARISON:  Chest radiograph 08/13/2022    FINDINGS:  Monitoring leads overlie the chest.  No detrimental change.The lungs are symmetrically well expanded and clear, with normal appearance of pulmonary vasculature and no pleural effusion or pneumothorax.    The cardiac silhouette is normal in size. The hilar and mediastinal contours are unremarkable.    Bones are intact.                                        X-Ray Hand 2 View Right (Final result)  Result time 02/23/24 17:22:43      Final result by Jorden Otero MD (02/23/24 17:22:43)                   Impression:      Acute fractures of the 2nd and 3rd finger middle phalanx.      Electronically signed by: Jorden Otero MD  Date:    02/23/2024  Time:    17:22               Narrative:    EXAMINATION:  XR HAND 2 VIEW RIGHT    CLINICAL HISTORY:  Unspecified multiple injuries, initial encounter    TECHNIQUE:  Right hand 4 total views.    COMPARISON:  January 2023.    FINDINGS:  Acute displaced fracture is seen through the 3rd finger middle phalanx with posterior displacement of the distal fracture fragment.  Additional acute nondisplaced fracture is seen of the 2nd finger middle phalanx.    No additional acute displaced fracture or dislocation seen.  There is chronic change and likely remote fracture injury of the scaphoid again seen.  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  Punctate metallic foreign body is again seen adjacent to the 3rd metacarpal head, unchanged.                                       Medications   sodium chloride 0.9% bolus 1,000 mL 1,000 mL (0 mLs Intravenous Stopped 2/23/24 1802)   HYDROmorphone injection 1 mg (1 mg Intravenous Given 2/23/24 1627)   ceFAZolin (ANCEF) 1 gram in dextrose 5 % 50 mL IVPB (premix) (0 g Intravenous Stopped 2/23/24 1802)   HYDROmorphone injection 1 mg (1 mg Intravenous Given 2/23/24 1951)     Medical Decision Making  Amount and/or Complexity of Data Reviewed  Labs: ordered.  Radiology: ordered.    Risk  Prescription drug management.    Given the above, this patient presents emergency room with multiple lacerations of multiple fingers.  The patient has open fractures of the index finger and middle finger of the right hand.  We have no hand surgeon available.  I will attempt to transfer this patient so the patient can be cared for by hand surgeon.   The patient was transported the [place]  [facility] to the trauma service for repair and treatment of the open finger fracture.        Scribe Attestation:   Scribe #1: I performed the above scribed service and the documentation accurately describes the services I performed. I attest to the accuracy of the note.        ED Course as of 02/24/24 1856 Fri Feb 23, 2024 1948 Care of this patient was transferred to me (Dr. Mars) from Dr. Hunter at shift change pending disposition.  EKG resolved:  Normal sinus rhythm, rate of 77 beats per minute, normal ST, normal T-wave, no STEMI.    Transfer request was placed to [facility] for further orthopedic evaluation/treatment of open fracture to index and middle fingers. [EB]   1950 ED Course as of 02/23/24 1950 Fri Feb 23, 2024 1948 Care of this patient was transferred to me (Dr. Mars) from Dr. Hunter at shift change pending disposition.  EKG resolved:  Normal sinus rhythm, rate of 77 beats per minute, normal ST, normal T-wave, no STEMI.    Transfer request was placed to [facility] for further orthopedic evaluation/treatment of open fracture to index and middle fingers. [EB]    ED Course User Index  [EB] Charles Mars MD   [EB]      ED Course User Index  [EB] Charles Mars MD       Please note that the documentation on this chart was provided by the scribe above on the date of service noted above, and that the documentation in the chart accurately reflects the work and decisions made by me alone.  Signed, Dr. Hunter                    Clinical Impression:  Final diagnoses:  [Z01.818] Preoperative clearance  [T07.XXXA] Multiple lacerations  [S62.624B] Open displaced fracture of middle phalanx of right ring finger, initial encounter (Primary)  [S62.654B] Open nondisplaced fracture of middle phalanx of right ring finger, initial encounter  [S61.011A] Laceration of right thumb without foreign body without damage to nail, initial encounter          ED Disposition Condition     Transfer to Another Facility                 Omar Hunter MD  02/23/24 [id number]       Omar Hunter MD  02/24/24 9246

## 2024-02-24 NOTE — PROVIDER PROGRESS NOTES - EMERGENCY DEPT.
Encounter Date: 2/23/2024    ED Physician Progress Notes        Physician Note:   Care of this patient was transferred to me (Dr. Mars) from Dr. Hunter at shift change pending disposition.  EKG resolved: Normal sinus rhythm, rate of 77 beats per minute, normal ST, normal T-wave, no STEMI.   Transfer request was placed to [facility] for further orthopedic evaluation/treatment of open fracture to index and middle fingers.   Patient was accepted to [facility] ED to ED ()  for hand surgery evaluation/treatment.

## 2024-02-25 LAB
OHS QRS DURATION: 84 MS
OHS QTC CALCULATION: 416 MS

## 2024-02-26 ENCOUNTER — TELEPHONE (OUTPATIENT)
Dept: ORTHOPEDICS | Facility: CLINIC | Age: 54
End: 2024-02-26
Payer: MEDICAID

## 2024-02-26 NOTE — TELEPHONE ENCOUNTER
----- Message from Lydia Padgett LPN sent at 2/26/2024  1:05 PM CST -----  I think this is urgent!!!  ----- Message -----  From: David Argueta  Sent: 2/26/2024  12:25 PM CST  To: [facility] Clinical Support    Consult/Advisory    Name Of Caller:Jarrod       Contact Preference:[phone]    Nature of call: Ptn was seen in the ER on Friday regarding 3 fingers being cut off and fractured nothing is showing avail please call to assist he stated he also he is in severe pain

## 2024-02-26 NOTE — TELEPHONE ENCOUNTER
----- Message from Lydia Padgett LPN sent at 2/26/2024  1:08 PM CST -----    ----- Message -----  From: David Argueta  Sent: 2/26/2024  12:25 PM CST  To: [facility] Clinical Support    Consult/Advisory    Name Of Caller:Jarrod       Contact Preference:[phone]    Nature of call: Ptn was seen in the ER on Friday regarding 3 fingers being cut off and fractured nothing is showing avail please call to assist he stated he also he is in severe pain

## 2024-02-27 ENCOUNTER — OFFICE VISIT (OUTPATIENT)
Dept: ORTHOPEDICS | Facility: CLINIC | Age: 54
End: 2024-02-27
Payer: MEDICAID

## 2024-02-27 ENCOUNTER — TELEPHONE (OUTPATIENT)
Dept: ORTHOPEDICS | Facility: CLINIC | Age: 54
End: 2024-02-27

## 2024-02-27 VITALS
BODY MASS INDEX: 29.76 KG/M2 | HEART RATE: 65 BPM | HEIGHT: 73 IN | DIASTOLIC BLOOD PRESSURE: 83 MMHG | WEIGHT: 224.56 LBS | SYSTOLIC BLOOD PRESSURE: 148 MMHG

## 2024-02-27 DIAGNOSIS — S62.629A: Primary | ICD-10-CM

## 2024-02-27 DIAGNOSIS — E08.00 DIABETES MELLITUS DUE TO UNDERLYING CONDITION WITH HYPEROSMOLARITY WITHOUT COMA, WITHOUT LONG-TERM CURRENT USE OF INSULIN: ICD-10-CM

## 2024-02-27 PROCEDURE — 3079F DIAST BP 80-89 MM HG: CPT | Mod: CPTII,,,

## 2024-02-27 PROCEDURE — 4010F ACE/ARB THERAPY RXD/TAKEN: CPT | Mod: CPTII,,,

## 2024-02-27 PROCEDURE — 99205 OFFICE O/P NEW HI 60 MIN: CPT | Mod: S$PBB,,,

## 2024-02-27 PROCEDURE — 3077F SYST BP >= 140 MM HG: CPT | Mod: CPTII,,,

## 2024-02-27 PROCEDURE — 99999 PR PBB SHADOW E&M-EST. PATIENT-LVL V: CPT | Mod: PBBFAC,,,

## 2024-02-27 PROCEDURE — 99215 OFFICE O/P EST HI 40 MIN: CPT | Mod: PBBFAC,PN

## 2024-02-27 PROCEDURE — 1160F RVW MEDS BY RX/DR IN RCRD: CPT | Mod: CPTII,,,

## 2024-02-27 PROCEDURE — 1159F MED LIST DOCD IN RCRD: CPT | Mod: CPTII,,,

## 2024-02-27 PROCEDURE — 3008F BODY MASS INDEX DOCD: CPT | Mod: CPTII,,,

## 2024-02-27 RX ORDER — OXYCODONE AND ACETAMINOPHEN 5; 325 MG/1; MG/1
1 TABLET ORAL EVERY 4 HOURS PRN
Qty: 28 EACH | Refills: 0 | Status: SHIPPED | OUTPATIENT
Start: 2024-02-27 | End: 2024-03-21 | Stop reason: ALTCHOICE

## 2024-02-27 RX ORDER — CEFAZOLIN SODIUM 2 G/50ML
2 SOLUTION INTRAVENOUS
Status: CANCELLED | OUTPATIENT
Start: 2024-02-27

## 2024-02-27 NOTE — H&P
SUBJECTIVE:      Chief Complaint: right hand injury    History of Present Illness:  Patient is a 53 y.o. right hand dominant  male with past medical history of poorly controlled diabetes who presents today with complaints of right hand injury.  Patient reports on 02/23/2024 he was using a  and had acute injury to his right middle index and thumb fingers.  Thinks he went to fix his jammed  and the pressure release, causing it to sliced across the top of his fingers.  He was seen at the [facility] emergency room and ultimately transferred to [facility] for concern of open fracture, where he was given antibiotics and had sutures placed to right index, middle, and thumb.  Thinks he was on Keflex.  He was placed in a soft dressing. Requesting increased pain medications today. He was not given any pain medication at [facility], states his follow up with scheduled for end of March.    The patient is a/an self employed, cuts grass.     Onset of symptoms/DOI was 2/23/2024.    Symptoms are aggravated by movement.    Symptoms are alleviated by rest.    Symptoms consist of pain, swelling, decreased ROM, and numbness/tingling.    The patient rates their pain as a 8/10.    Attempted treatment(s) and/or interventions include activity modifications, rest, splinting/casting.     The patient denies any fevers, chills, N/V, D/C and presents for evaluation.       Past Medical History:   Diagnosis Date    Diabetes mellitus     Diabetes mellitus type I     Hyperlipidemia associated with type 2 diabetes mellitus 9/27/2023    Hypertension      Past Surgical History:   Procedure Laterality Date    COLONOSCOPY  01/28/2022    COLONOSCOPY N/A 01/28/2022    Procedure: COLONOSCOPY;  Surgeon: Don Mon MD;  Location: [facility];  Service: Endoscopy;  Laterality: N/A;    right knee       Review of patient's allergies indicates:  No Known Allergies  Social History     Social History Narrative    Not on file     Family History   Problem  Relation Age of Onset    Kidney disease Mother     Hypertension Father     Diabetes type II Father          Current Outpatient Medications:     amitriptyline (ELAVIL) 25 MG tablet, Take 1 tablet by mouth in the evening, Disp: 90 tablet, Rfl: 1    blood sugar diagnostic Strp, To check BG 2 times daily, to use with insurance preferred meter, Disp: 200 each, Rfl: 3    blood-glucose meter kit, To check BG 2 times daily, to use with insurance preferred meter, Disp: 1 each, Rfl: 0    gabapentin (NEURONTIN) 300 MG capsule, TAKE 1 CAPSULE BY MOUTH IN THE EVENING, Disp: 90 capsule, Rfl: 0    glimepiride (AMARYL) 4 MG tablet, Take 1 tablet (4 mg total) by mouth before breakfast., Disp: 90 tablet, Rfl: 0    lancets Misc, To check BG 2 times daily, to use with insurance preferred meter, Disp: 200 each, Rfl: 3    linaGLIPtin (TRADJENTA) 5 mg Tab tablet, Take 1 tablet (5 mg total) by mouth once daily., Disp: 90 tablet, Rfl: 0    losartan (COZAAR) 50 MG tablet, Take 1 tablet (50 mg total) by mouth once daily., Disp: 90 tablet, Rfl: 3    rosuvastatin (CRESTOR) 40 MG Tab, Take 1 tablet (40 mg total) by mouth every evening., Disp: 90 tablet, Rfl: 3    tadalafiL (CIALIS) 5 MG tablet, Take 5 mg by mouth., Disp: , Rfl:     oxyCODONE-acetaminophen (PERCOCET) 5-325 mg per tablet, Take 1 tablet by mouth every 4 (four) hours as needed., Disp: 28 each, Rfl: 0  No current facility-administered medications for this visit.    Facility-Administered Medications Ordered in Other Visits:     sodium chloride 0.9% flush 10 mL, 10 mL, Intravenous, PRN, Don Mon MD      Review of Systems:  Constitutional: no fever or chills  Eyes: no visual changes  ENT: no nasal congestion or sore throat  Respiratory: no cough or shortness of breath  Cardiovascular: no chest pain  Gastrointestinal: no nausea or vomiting, tolerating diet  Musculoskeletal: pain, soreness, numbness/tingling, decreased ROM, wound, and laceration    OBJECTIVE:      Vital Signs  "(Most Recent):  Vitals:    02/27/24 0908   BP: (!) 148/83   Pulse: 65   Weight: 101.9 kg (224 lb 8.6 oz)   Height: 6' 1" (1.854 m)     Body mass index is 29.62 kg/m².      Physical Exam:  Constitutional: The patient appears well-developed and well-nourished. No distress.   Skin: No lesions appreciated  Head: Normocephalic and atraumatic.   Nose: Nose normal.   Ears: No deformities seen  Eyes: Conjunctivae and EOM are normal.   Neck: No tracheal deviation present.   Cardiovascular: Normal rate and intact distal pulses.    Pulmonary/Chest: Effort normal. No respiratory distress.   Abdominal: There is no guarding.   Neurological: The patient is alert.   Psychiatric: The patient has a normal mood and affect.     Right Hand/Wrist Examination:  Bear sutures in place to the radial aspect of the right middle finger, thumb and index finger.  Sensation intact.  Patient unable to flex or extend digits.  There is no open active drainage however there is maceration to middle incision.  Image uploaded to media tab.  Soft dressing reapplied.    Diagnostic Results:     Imaging - I independently viewed the patient's imaging as well as the radiology report.  Xrays of the patient's right hand demonstrate acute nondisplaced fracture of 2nd middle phalanx and acute posterior displacement transverse fracture of 3rd middle phalanx.    ASSESSMENT/PLAN:      1. Displaced fracture of middle phalanx of finger of right hand        2. Diabetes mellitus due to underlying condition with hyperosmolarity without coma, without long-term current use of insulin  HEMOGLOBIN A1C        Patient here for right hand pain after lawnmower accident about 4 days ago.  He was seen at the [facility] ER and was ultimately referred to [facility] for concern of open fracture.  He has multiple sutures placed to his thumb, index and middle finger.  Unable to provide range of motion today given pain.  We discussed open reduction internal fixation to the right middle phalanx " as well as possible incision and drainage of wound.  Patient is a poorly-controlled diabetic and understands he is at risks for wound healing complications postoperatively.  We will proceed with operative intervention for 3/4/2024.  He was placed in a soft dressing today and instructed to finish antibiotics, Percocet rx sent.    1. Imaging results reviewed today and discussed with Wilfrido Prado MD. We reviewed with Jarrod Mercado today, the pathology and natural history of his diagnosis. We have discussed a variety of treatment options including medications, physical therapy and other alternative treatments. I also explained the indications, risks and benefits of surgery. After discussion, he decided to proceed with surgery. The decision was made to go forward with:  Open reduction internal fixation, right middle finger  Possible incision and drainage, right middle finger     The details of the surgical procedure were explained, including the location of probable incisions and a description of likely hardware and/or grafts to be used. The patient understands the likely convalescence after surgery. Also, we have thoroughly discussed the risks, benefits and alternatives to surgery, including, but not limited to, the risk of infection, joint stiffness, blood clot (including DVT and/or pulmonary embolus), neurologic and vascular injury.  It was explained that, if tissue has been repaired or reconstructed, there is a chance of failure, which may require further management.    Post-Op Medications to be prescribed:   Percocet 5/325mg Take 1-2 tablets every 4-6 hours PRN pain #28  Zofran 4mg oral disintegrating tablets every 8 hours PRN nausea/vomiting   Motrin 600 mg TID PRN     Medical Clearance: No  Hx of DVT,PE, anesthetic complications: No     Additional notes/concerns:  Poorly-controlled diabetic     Opioid Disclosure  Today we discussed the reasons why the prescription is necessary as well as: the risks of  addiction and overdose associated with opioid drugs and the dangers of taking opioid drugs with alcohol, benzodiazepines and other central nervous system depressants; alternative treatments that may be available; the risks associated with the use of the drugs being prescribed, specifically that opioids are highly addictive, even when taken as prescribed, that there is a risk of developing a physical or psychological dependence on the controlled dangerous substance, and that the risks of taking more opioids than prescribed or mixing sedatives, benzodiazepines or alcohol with opioids can result in fatal respiratory depression.    Follow up:  2 weeks postop  Xrays needed:  Right hand     All of the patient's questions were answered and the patient will contact us if they have any questions or concerns in the interim.

## 2024-02-27 NOTE — PROGRESS NOTES
SUBJECTIVE:      Chief Complaint: right hand injury    History of Present Illness:  Patient is a 53 y.o. right hand dominant  male with past medical history of poorly controlled diabetes who presents today with complaints of right hand injury.  Patient reports on 02/23/2024 he was using a  and had acute injury to his right middle index and thumb fingers.  Thinks he went to fix his jammed  and the pressure release, causing it to sliced across the top of his fingers.  He was [facility] [place] [facility] [place] and ultimately transferred to [facility] for concern of open fracture, where he was given antibiotics and had sutures placed to right index, middle, and thumb.  Thinks he was on Keflex.  He was placed in a soft dressing. Requesting increased pain medications today. He was not given any pain medication at [facility], states his follow up with scheduled for end of March.    The patient is a/an self employed, cuts grass.     Onset of symptoms/DOI was 2/23/2024.    Symptoms are aggravated by movement.    Symptoms are alleviated by rest.    Symptoms consist of pain, swelling, decreased ROM, and numbness/tingling.    The patient rates their pain as a 8/10.    Attempted treatment(s) and/or interventions include activity modifications, rest, splinting/casting.     The patient denies any fevers, chills, N/V, D/C and presents for evaluation.       Past Medical History:   Diagnosis Date    Diabetes mellitus     Diabetes mellitus type I     Hyperlipidemia associated with type 2 diabetes mellitus 9/27/2023    Hypertension      Past Surgical History:   Procedure Laterality Date    COLONOSCOPY  01/28/2022    COLONOSCOPY N/A 01/28/2022    Procedure: COLONOSCOPY;  Surgeon: Don Mon MD;  Location: [facility];  Service: Endoscopy;  Laterality: N/A;    right knee       Review of patient's allergies indicates:  No Known Allergies  Social History     Social History Narrative    Not on file     Family History   Problem  Relation Age of Onset    Kidney disease Mother     Hypertension Father     Diabetes type II Father          Current Outpatient Medications:     amitriptyline (ELAVIL) 25 MG tablet, Take 1 tablet by mouth in the evening, Disp: 90 tablet, Rfl: 1    blood sugar diagnostic Strp, To check BG 2 times daily, to use with insurance preferred meter, Disp: 200 each, Rfl: 3    blood-glucose meter kit, To check BG 2 times daily, to use with insurance preferred meter, Disp: 1 each, Rfl: 0    gabapentin (NEURONTIN) 300 MG capsule, TAKE 1 CAPSULE BY MOUTH IN THE EVENING, Disp: 90 capsule, Rfl: 0    glimepiride (AMARYL) 4 MG tablet, Take 1 tablet (4 mg total) by mouth before breakfast., Disp: 90 tablet, Rfl: 0    lancets Misc, To check BG 2 times daily, to use with insurance preferred meter, Disp: 200 each, Rfl: 3    linaGLIPtin (TRADJENTA) 5 mg Tab tablet, Take 1 tablet (5 mg total) by mouth once daily., Disp: 90 tablet, Rfl: 0    losartan (COZAAR) 50 MG tablet, Take 1 tablet (50 mg total) by mouth once daily., Disp: 90 tablet, Rfl: 3    rosuvastatin (CRESTOR) 40 MG Tab, Take 1 tablet (40 mg total) by mouth every evening., Disp: 90 tablet, Rfl: 3    tadalafiL (CIALIS) 5 MG tablet, Take 5 mg by mouth., Disp: , Rfl:     oxyCODONE-acetaminophen (PERCOCET) 5-325 mg per tablet, Take 1 tablet by mouth every 4 (four) hours as needed., Disp: 28 each, Rfl: 0  No current facility-administered medications for this visit.    Facility-Administered Medications Ordered in Other Visits:     sodium chloride 0.9% flush 10 mL, 10 mL, Intravenous, PRN, Don Mon MD      Review of Systems:  Constitutional: no fever or chills  Eyes: no visual changes  ENT: no nasal congestion or sore throat  Respiratory: no cough or shortness of breath  Cardiovascular: no chest pain  Gastrointestinal: no nausea or vomiting, tolerating diet  Musculoskeletal: pain, soreness, numbness/tingling, decreased ROM, wound, and laceration    OBJECTIVE:      Vital Signs  "(Most Recent):  Vitals:    02/27/24 0908   BP: (!) 148/83   Pulse: 65   Weight: 101.9 kg (224 lb 8.6 oz)   Height: 6' 1" (1.854 m)     Body mass index is 29.62 kg/m².      Physical Exam:  Constitutional: The patient appears well-developed and well-nourished. No distress.   Skin: No lesions appreciated  Head: Normocephalic and atraumatic.   Nose: Nose normal.   Ears: No deformities seen  Eyes: Conjunctivae and EOM are normal.   Neck: No tracheal deviation present.   Cardiovascular: Normal rate and intact distal pulses.    Pulmonary/Chest: Effort normal. No respiratory distress.   Abdominal: There is no guarding.   Neurological: The patient is alert.   Psychiatric: The patient has a normal mood and affect.     Right Hand/Wrist Examination:  Bear sutures in place to the radial aspect of the right middle finger, thumb and index finger.  Sensation intact.  Patient unable to flex or extend digits.  There is no open active drainage however there is maceration to middle incision.  Image uploaded to media tab.  Soft dressing reapplied.    Diagnostic Results:     Imaging - I independently viewed the patient's imaging as well as the radiology report.  Xrays of the patient's right hand demonstrate acute nondisplaced fracture of 2nd middle phalanx and acute posterior displacement transverse fracture of 3rd middle phalanx.    ASSESSMENT/PLAN:      1. Displaced fracture of middle phalanx of finger of right hand        2. Diabetes mellitus due to underlying condition with hyperosmolarity without coma, without long-term current use of insulin  HEMOGLOBIN A1C        Patient here for right hand pain after lawnmower accident about 4 days ago.  He was seen at the [facility] ER and was ultimately referred to [facility] for concern of open fracture.  He has multiple sutures placed to his thumb, index and middle finger.  Unable to provide range of motion today given pain.  We discussed open reduction internal fixation to the right middle phalanx " as well as possible incision and drainage of wound.  Patient is a poorly-controlled diabetic and understands he is at risks for wound healing complications postoperatively.  We will proceed with operative intervention for 3/4/2024.  He was placed in a soft dressing today and instructed to finish antibiotics, Percocet rx sent.    1. Imaging results reviewed today and discussed with Wilfrido Prado MD. We reviewed with Jarrod Mercado today, the pathology and natural history of his diagnosis. We have discussed a variety of treatment options including medications, physical therapy and other alternative treatments. I also explained the indications, risks and benefits of surgery. After discussion, he decided to proceed with surgery. The decision was made to go forward with:  Open reduction internal fixation, right middle finger  Possible incision and drainage, right middle finger     The details of the surgical procedure were explained, including the location of probable incisions and a description of likely hardware and/or grafts to be used. The patient understands the likely convalescence after surgery. Also, we have thoroughly discussed the risks, benefits and alternatives to surgery, including, but not limited to, the risk of infection, joint stiffness, blood clot (including DVT and/or pulmonary embolus), neurologic and vascular injury.  It was explained that, if tissue has been repaired or reconstructed, there is a chance of failure, which may require further management.    Post-Op Medications to be prescribed:   Percocet 5/325mg Take 1-2 tablets every 4-6 hours PRN pain #28  Zofran 4mg oral disintegrating tablets every 8 hours PRN nausea/vomiting   Motrin 600 mg TID PRN     Medical Clearance: No  Hx of DVT,PE, anesthetic complications: No     Additional notes/concerns:  Poorly-controlled diabetic     Opioid Disclosure  Today we discussed the reasons why the prescription is necessary as well as: the risks of  addiction and overdose associated with opioid drugs and the dangers of taking opioid drugs with alcohol, benzodiazepines and other central nervous system depressants; alternative treatments that may be available; the risks associated with the use of the drugs being prescribed, specifically that opioids are highly addictive, even when taken as prescribed, that there is a risk of developing a physical or psychological dependence on the controlled dangerous substance, and that the risks of taking more opioids than prescribed or mixing sedatives, benzodiazepines or alcohol with opioids can result in fatal respiratory depression.    Follow up:  2 weeks postop  Xrays needed:  Right hand     All of the patient's questions were answered and the patient will contact us if they have any questions or concerns in the interim.

## 2024-02-28 ENCOUNTER — LAB VISIT (OUTPATIENT)
Dept: LAB | Facility: HOSPITAL | Age: 54
End: 2024-02-28
Payer: MEDICAID

## 2024-02-28 DIAGNOSIS — R80.9 MICROALBUMINURIA: ICD-10-CM

## 2024-02-28 DIAGNOSIS — E08.00 DIABETES MELLITUS DUE TO UNDERLYING CONDITION WITH HYPEROSMOLARITY WITHOUT COMA, WITHOUT LONG-TERM CURRENT USE OF INSULIN: ICD-10-CM

## 2024-02-28 LAB
ALBUMIN/CREAT UR: 84.5 UG/MG (ref 0–30)
CREAT UR-MCNC: 207 MG/DL (ref 23–375)
ESTIMATED AVG GLUCOSE: 226 MG/DL (ref 68–131)
HBA1C MFR BLD: 9.5 % (ref 4–5.6)
MICROALBUMIN UR DL<=1MG/L-MCNC: 175 UG/ML

## 2024-02-28 PROCEDURE — 36415 COLL VENOUS BLD VENIPUNCTURE: CPT | Mod: PN | Performed by: NURSE PRACTITIONER

## 2024-02-28 PROCEDURE — 83036 HEMOGLOBIN GLYCOSYLATED A1C: CPT | Performed by: NURSE PRACTITIONER

## 2024-02-28 PROCEDURE — 82043 UR ALBUMIN QUANTITATIVE: CPT | Performed by: NURSE PRACTITIONER

## 2024-03-04 DIAGNOSIS — G89.18 ACUTE POST-OPERATIVE PAIN: Primary | ICD-10-CM

## 2024-03-04 RX ORDER — OXYCODONE AND ACETAMINOPHEN 5; 325 MG/1; MG/1
1 TABLET ORAL EVERY 4 HOURS PRN
Qty: 28 EACH | Refills: 0 | Status: SHIPPED | OUTPATIENT
Start: 2024-03-04 | End: 2024-03-21 | Stop reason: SDUPTHER

## 2024-03-04 RX ORDER — ONDANSETRON 4 MG/1
4 TABLET, ORALLY DISINTEGRATING ORAL 2 TIMES DAILY
Qty: 20 TABLET | Refills: 0 | Status: SHIPPED | OUTPATIENT
Start: 2024-03-04

## 2024-03-04 RX ORDER — IBUPROFEN 600 MG/1
600 TABLET ORAL 3 TIMES DAILY
Qty: 60 TABLET | Refills: 0 | Status: SHIPPED | OUTPATIENT
Start: 2024-03-04

## 2024-03-04 RX ORDER — SULFAMETHOXAZOLE AND TRIMETHOPRIM 800; 160 MG/1; MG/1
1 TABLET ORAL 2 TIMES DAILY
Qty: 14 TABLET | Refills: 0 | Status: SHIPPED | OUTPATIENT
Start: 2024-03-04

## 2024-03-20 ENCOUNTER — PATIENT MESSAGE (OUTPATIENT)
Dept: ORTHOPEDICS | Facility: CLINIC | Age: 54
End: 2024-03-20
Payer: MEDICAID

## 2024-03-20 DIAGNOSIS — S62.629A: Primary | ICD-10-CM

## 2024-03-21 ENCOUNTER — CLINICAL SUPPORT (OUTPATIENT)
Dept: REHABILITATION | Facility: HOSPITAL | Age: 54
End: 2024-03-21
Payer: MEDICAID

## 2024-03-21 ENCOUNTER — OFFICE VISIT (OUTPATIENT)
Dept: ORTHOPEDICS | Facility: CLINIC | Age: 54
End: 2024-03-21
Payer: MEDICAID

## 2024-03-21 VITALS
DIASTOLIC BLOOD PRESSURE: 88 MMHG | WEIGHT: 224.44 LBS | HEIGHT: 73 IN | SYSTOLIC BLOOD PRESSURE: 146 MMHG | BODY MASS INDEX: 29.74 KG/M2 | HEART RATE: 60 BPM

## 2024-03-21 DIAGNOSIS — G89.18 ACUTE POST-OPERATIVE PAIN: ICD-10-CM

## 2024-03-21 DIAGNOSIS — S62.629A: Primary | ICD-10-CM

## 2024-03-21 DIAGNOSIS — Z78.9 IMPAIRED INSTRUMENTAL ACTIVITIES OF DAILY LIVING (IADL): ICD-10-CM

## 2024-03-21 DIAGNOSIS — L90.5 SCAR: ICD-10-CM

## 2024-03-21 DIAGNOSIS — M25.641 DECREASED RANGE OF MOTION OF FINGER OF RIGHT HAND: ICD-10-CM

## 2024-03-21 DIAGNOSIS — R60.0 LOCALIZED EDEMA: ICD-10-CM

## 2024-03-21 PROCEDURE — 1159F MED LIST DOCD IN RCRD: CPT | Mod: CPTII,,,

## 2024-03-21 PROCEDURE — 99213 OFFICE O/P EST LOW 20 MIN: CPT | Mod: PBBFAC,PN

## 2024-03-21 PROCEDURE — 99999 PR PBB SHADOW E&M-EST. PATIENT-LVL III: CPT | Mod: PBBFAC,,,

## 2024-03-21 PROCEDURE — 3060F POS MICROALBUMINURIA REV: CPT | Mod: CPTII,,,

## 2024-03-21 PROCEDURE — 97530 THERAPEUTIC ACTIVITIES: CPT | Mod: PN

## 2024-03-21 PROCEDURE — 97166 OT EVAL MOD COMPLEX 45 MIN: CPT | Mod: PN

## 2024-03-21 PROCEDURE — 3066F NEPHROPATHY DOC TX: CPT | Mod: CPTII,,,

## 2024-03-21 PROCEDURE — 99024 POSTOP FOLLOW-UP VISIT: CPT | Mod: ,,,

## 2024-03-21 PROCEDURE — 4010F ACE/ARB THERAPY RXD/TAKEN: CPT | Mod: CPTII,,,

## 2024-03-21 PROCEDURE — 3077F SYST BP >= 140 MM HG: CPT | Mod: CPTII,,,

## 2024-03-21 PROCEDURE — 3046F HEMOGLOBIN A1C LEVEL >9.0%: CPT | Mod: CPTII,,,

## 2024-03-21 PROCEDURE — 3079F DIAST BP 80-89 MM HG: CPT | Mod: CPTII,,,

## 2024-03-21 RX ORDER — OXYCODONE AND ACETAMINOPHEN 5; 325 MG/1; MG/1
1 TABLET ORAL EVERY 4 HOURS PRN
Qty: 28 EACH | Refills: 0 | Status: SHIPPED | OUTPATIENT
Start: 2024-03-21

## 2024-03-21 NOTE — PLAN OF CARE
"  [facility]  Occupational Therapy Initial Evaluation     Name: Jarrod Mercado  [facility] Number: [id number]    Therapy Diagnosis:   Encounter Diagnosis   Name Primary?    Displaced fracture of middle phalanx of finger of right hand Yes     Physician: Wilfrido Prado MD/Roma Anna, *    Physician Orders: Eval and Treat  Medical Diagnosis: S62.629A (ICD-10-CM) - Displaced fracture of middle phalanx of finger of right hand  Surgical Procedure and Date: 3/4/24   Open reduction internal fixation right long finger middle phalanx  Extensor tendon repair right long finger  Removal of nail right thumb with repair of the nail bed  Irrigation debridement right thumb and long finger  Closed treatment right index finger middle phalanx     Evaluation Date: 3/21/2024  Insurance Authorization Period Expiration: TBD  Plan of Care Certification Period: 3/21/24-6/30/24  Date of Return to MD: 3/21/24  Visit # / Visits authorized: 1/1  FOTO: Initial evaluation/15.4%    Precautions:  Standard; Pt is currently 17 days post-op     Time In:13:00  Time Out: 13:45  Total Appointment Time (timed & untimed codes): 45 minutes    Subjective     Date of Onset: 2/23/24 Laceration to R hand while using lawnmower     History of Current Condition/Mechanism of Injury: Jarrod reports: "I have to get my hand back. I will need to work the whole time I'm recovering. I own my business. I don't have to push a mower though. I also volunteer for the [employer]. "    Falls: No    Involved Side: right   Dominant Side: Right    Mechanism of Injury: Laceration to R hand   Surgical Procedure: 3/4/24   Open reduction internal fixation right long finger middle phalanx  Extensor tendon repair right long finger  Removal of nail right thumb with repair of the nail bed  Irrigation debridement right thumb and long finger  Closed treatment right index finger middle phalanx    Imaging: X-Rays R hand  2/23/24 (pre-op " "imaging)   "EXAMINATION:  XR HAND 2 VIEW RIGHT     CLINICAL HISTORY:  Unspecified multiple injuries, initial encounter     TECHNIQUE:  Right hand 4 total views.     COMPARISON:  January 2023.     FINDINGS:  Acute displaced fracture is seen through the 3rd finger middle phalanx with posterior displacement of the distal fracture fragment.  Additional acute nondisplaced fracture is seen of the 2nd finger middle phalanx.     No additional acute displaced fracture or dislocation seen.  There is chronic change and likely remote fracture injury of the scaphoid again seen.  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  Punctate metallic foreign body is again seen adjacent to the 3rd metacarpal head, unchanged.     Impression:     Acute fractures of the 2nd and 3rd finger middle phalanx."     Prior Therapy: No    Pain:  Functional Pain Scale Rating 0-10:   1/10-3/10 on average in R thumb; 4/10-6/10 in R index and long fingers  1/10 at best  6/10 at worst  Location: R thumb, index, and long fingers   Description: Variable  Aggravating Factors: nothing specific noted   Easing Factors: nothing specific noted     Occupation: owner of lawn maintenance  Working presently: employed  Duties: operating machinery to manage lawns     Functional Limitations/Social History:    Previous functional status includes: Independent with all ADLs.     Current Functional Status   Home/Living environment: lives with their family        Limitation of Functional Status as follows:   ADLs/IADLs:     - Feeding: Mod I    - Bathing: Mod I    - Dressing/Grooming: Mod I    - Home Management: Max A    - Driving: Mod I     Leisure: fishing, hunting    Patient's Goals for Therapy: Recover from surgery; Pt states, "Why do I have to come here?"     Past Medical History/Physical Systems Review:   Jarrod Mercado  has a past medical history of Diabetes mellitus, Diabetes mellitus type I, Hyperlipidemia associated with type 2 diabetes mellitus, and Hypertension.    Jarrod " Jess  has a past surgical history that includes right knee; Colonoscopy (01/28/2022); Colonoscopy (N/A, 01/28/2022); Open reduction and internal fixation (ORIF) of injury of finger (03/04/2024); Incision and drainage of wound (03/04/2024); Open reduction and internal fixation (ORIF) of injury of finger (Right, 3/4/2024); and Incision and drainage of hand (Right, 3/4/2024).    Jarrod has a current medication list which includes the following prescription(s): amitriptyline, blood sugar diagnostic, blood-glucose meter, gabapentin, glimepiride, ibuprofen, lancets, tradjenta, losartan, ondansetron, oxycodone-acetaminophen, oxycodone-acetaminophen, rosuvastatin, sulfamethoxazole-trimethoprim 800-160mg, and tadalafil, and the following Facility-Administered Medications: sodium chloride 0.9%.    Review of patient's allergies indicates:  No Known Allergies     Objective     Observation/Appearance:  Skin intact and dry, edema present through digits, eschar present at thenar nail bed, surgical incision sites to long finger healing routinely (no eschar or discharge present)     Edema. Measured in centimeters.- assess L hand for comparative measurements    3/21/2024 3/21/2024    Right  Left    Index:       P1 8 TBA    PIP 8 TBA   P2 7 TBA    DIP 7 TBA   P3 5.5 TBA   Long:       P1 8.1 TBA    PIP 8.3 TBA   P2 7.8 TBA    DIP 6.8 TBA   P3 6 TBA   Thumb:     Prox. Phalanx TBA TBA   IP TBA TBA   Distal Phalanx TBA TBA     Hand/Wrist ROM. Measured in degrees.- assess L hand for comparative measurements; assess B thumb ROM next session    3/21/2024 3/21/2024    Right  Left         Index: MP  0/75 TBA              PIP     0/35 TBA              DIP 0/10 TBA              SMITH 115 TBA        Long:  MP 0/70 TBA              PIP 0/30 TBA              DIP 0/10 TBA              SMITH TBA TBA        Thumb: MP TBA TBA                IP TBA TBA       Rad ADD/ABD WNL WNL       Pal ADD/ABD WNL WNL          Opposition 0 cm from 5th volar MP 0 cm from  5th volar MP     Wrist Flex/Ext/RD/UD: WNL and symmetrical bilaterally    Sensation:TBA   NT Distribution 3/21/2024 3/21/2024    Right  Left    [place]     Normal 1.65-2.83     Diminished Light Touch 3.22-3.61     Diminished Protective 3.84-4.31     Loss of Protective 4.56-6.65     Untestable >6.65          Strength (Dyanmometer) and Pinch Strength (Pinch Gauge)- deferred at this time   Measured in pounds and psi. Average of three trials.   3/21/2024 3/21/2024    Right  Left    Rung II     Zambrano Pinch     3pt Pinch     2pt Pinch         Manual Muscle Testing- deferred at this time         CMS Impairment/Limitation/Restriction for FOTO Hand Survey    Therapist reviewed FOTO scores for Jarrod Mercado on 3/21/2024.   FOTO documents entered into [employer] - see Media section.    Limitation Score: 15.4%  Category: ADLs and IADLs         Treatment     Total Treatment time separate from Evaluation time:15 minutes     Jarrod received the treatments listed below:      Therapeutic activities to improve functional performance for 10 minutes, including:  -Review of post-op precautions  -Provision of basic HEP  -Fabricated custom long finger PIP/DIP extension orthosis per MD orders: siva taping long to index finger at this time  -Cleaning and debridement of long finger     Patient Education and Home Exercises      Education provided:   -Role of OT, goals for OT, scheduling/cancellations, insurance limitations with patient.    Home Exercises Provided: yes.  Exercises were reviewed and Jarrod was able to demonstrate them prior to the end of the session.    Jarrod demonstrated good understanding of the education provided.     Pt was advised to perform these exercises free of pain, and to stop performing them if pain occurs.    See EMR under Media for exercises provided 3/21/2024.    Assessment     Jarrod Mercado is a 53 y.o. male referred to outpatient occupational therapy and presents with a medical diagnosis of S62.629A  (ICD-10-CM) - Displaced fracture of middle phalanx of finger of right hand.    Following medical record review it is determined that pt will benefit from occupational therapy services in order to maximize pain free and/or functional use of his R hand. The following goals were discussed with the patient and patient is in agreement with them as to be addressed in the treatment plan. The patient's rehab potential is Good.     Anticipated barriers to Occupational Therapy: None noted     Plan of care discussed with patient: Yes  Patient's spiritual, cultural and educational needs considered and patient is agreeable to the plan of care and goals as stated below:     Medical Necessity is demonstrated by the following  Occupational Profile/History  Co-morbidities and personal factors that may impact the plan of care [] LOW: Brief chart review  [x] MODERATE: Expanded chart review   [] HIGH: Extensive chart review    Moderate / High Support Documentation: post-op      Examination  Performance deficits relating to physical, cognitive or psychosocial skills that result in activity limitations and/or participation restrictions  [] LOW: addressing 1-3 Performance deficits  [x] MODERATE: 3-5 Performance deficits  [] HIGH: 5+ Performance deficits (please support below)    Moderate / High Support Documentation:    Physical:  Joint Mobility  Skin Integrity/Scar Formation  Edema   Strength  Pain    Cognitive:  No Deficits    Psychosocial:    No Deficits     Treatment Options [] LOW: Limited options  [x] MODERATE: Several options  [] HIGH: Multiple options      Decision Making/ Complexity Score: moderate       The following goals were discussed with the patient and patient is in agreement with them as to be addressed in the treatment plan.       ShortTerm Goals (to be met in 5 weeks or by 4/25/24):   1. Patient to be IND and compliant with HEP & attendance for duration of therapy.  2. Assess remaining digit edema, ROM & sensation  first visit and amend LTG.   3. Assess L hand /pinch strength and set goals for R hand.   4. Patient will report no more than 3/10 pain in R hand.       Long Term Goals (to be met in 10 weeks or by DC):   1. Patient to be IND and compliant with HEP & attendance for duration of therapy.  2. Patient will demonstrate increased SMITH in his R long finger within 10-20 degrees of his L long finger to increase functional hand use for ADLs and IADLs.   3. Patient will demonstrate increased SMITH in his R index finger within 0-5 degrees of his L index finger to increase functional hand use for ADLs and IADLs.   4. Patient will report no more than 1/10 pain in R hand.     Plan   Certification Period/Plan of care expiration: 3/21/2024 to 6/30/2024.    Outpatient Occupational Therapy 1-2 times weekly for 10 weeks to include the following interventions: Fluidotherapy, Manual therapy/joint mobilizations, Modalities for pain management, US 3 mhz, Therapeutic exercises/activities., Strengthening, Edema Control, Scar Management, and Wound Care.        Thank you for allowing me to assist in the care of your Patient. If you have any questions or concerns, please don't hesitate to e-mail or contact me directly.      MAXIMUS Kaba/L  [facility]   Phone: [phone]  Fax: [phone]

## 2024-03-21 NOTE — PATIENT INSTRUCTIONS
Patient instructed on the following:   -Gentle AROM for index and long finger flexion at this time  -Gentle PROM for thumb at this time  -All every hour, 10 minute sessions  -R hand NWB  -Limit lifting for R hand to < 2 lbs at this time

## 2024-03-21 NOTE — PROGRESS NOTES
Post-op Note    HPI    Jarrod Mercado is here 2 weeks s/p the following procedure:     3/4/2024  Open reduction internal fixation right long finger middle phalanx  Extensor tendon repair right long finger  Removal of nail right thumb with repair of the nail bed  Irrigation debridement right thumb and long finger  Closed treatment right index finger middle phalanx    Overall doing well. Pain controlled on current regimen. He is scheduled to start PT today. Denies any chest pain or shortness of breathe. Denies any drainage from the incision. Denies any fevers, chills or paresthesias. Completed bactrim. Has been in splint.     DVT Prophylaxis: none      Physical Exam:     Patient is alert and oriented no acute distress.   Assistive Device: radial gutter splint    Right hand: splint removed. Sutures removed. Foil removed. Incision(s) are well healed.  There is no evidence of dehiscence.  There is no induration erythema or signs of infection.  Appropriate soft tissue swelling.  Compartments are soft and compressible.  Warm well-perfused extremity. Third digital nerve intact to light touch. There is ulnar angulation of the third digit.     Imaging:     I have personally reviewed the following imaging and these are an interpretation of my findings:     X-Ray: I have reviewed all pertinent results/findings and my personal findings are:  Interval screw fixation with improved anatomic alignment of 3rd middle phalanx fracture.  Similar appearance and alignment of nondisplaced 2nd middle phalanx fracture.  Remote fracture of the 5th metacarpal head-neck junction.  Incidental lunotriquetral coalition.  Punctate metallic foreign body adjacent the 3rd metacarpal head, similar.  Radiocarpal and base of thumb DJD. Agree with radiologist.    Assessment    Jarrod Mercado is 2 weeks Post-op     Plan:    Overall doing as expected.  We discussed expectations of surgery and postoperative course.     Pain: Continued postoperative pain regimen  -- percocet refill sent  PT/OT: Continue/Initiate physical therapy (weight bearing status: NWB x 6 weeks),  okay to begin gentle range of motion and splint for 3rd digit to include PIP and DIP joint.    In 24 hours, you may shower without covering your incision.  Do not submerge your incision for another 2 weeks.  If steri strips applied, they will fall off on their own over the next week. You may start to do a scar massage with vitamin-E oil/cocoa butter to your incisions. Please inform the clinic if you experience any bleeding or discharge, warmth, or redness. Instructed to not let moisture into thumb.       Follow-up: 2 weeks   X-rays next visit: right hand

## 2024-04-04 ENCOUNTER — CLINICAL SUPPORT (OUTPATIENT)
Dept: REHABILITATION | Facility: HOSPITAL | Age: 54
End: 2024-04-04
Payer: MEDICAID

## 2024-04-04 ENCOUNTER — OFFICE VISIT (OUTPATIENT)
Dept: ORTHOPEDICS | Facility: CLINIC | Age: 54
End: 2024-04-04
Payer: MEDICAID

## 2024-04-04 VITALS
HEIGHT: 73 IN | HEART RATE: 59 BPM | WEIGHT: 224.44 LBS | BODY MASS INDEX: 29.74 KG/M2 | SYSTOLIC BLOOD PRESSURE: 162 MMHG | DIASTOLIC BLOOD PRESSURE: 97 MMHG

## 2024-04-04 DIAGNOSIS — S62.629A: Primary | ICD-10-CM

## 2024-04-04 DIAGNOSIS — M25.641 DECREASED RANGE OF MOTION OF FINGER OF RIGHT HAND: ICD-10-CM

## 2024-04-04 DIAGNOSIS — L90.5 SCAR: ICD-10-CM

## 2024-04-04 DIAGNOSIS — R60.0 LOCALIZED EDEMA: ICD-10-CM

## 2024-04-04 DIAGNOSIS — Z78.9 IMPAIRED INSTRUMENTAL ACTIVITIES OF DAILY LIVING (IADL): ICD-10-CM

## 2024-04-04 PROCEDURE — 99024 POSTOP FOLLOW-UP VISIT: CPT | Mod: ,,,

## 2024-04-04 PROCEDURE — 3046F HEMOGLOBIN A1C LEVEL >9.0%: CPT | Mod: CPTII,,,

## 2024-04-04 PROCEDURE — 3080F DIAST BP >= 90 MM HG: CPT | Mod: CPTII,,,

## 2024-04-04 PROCEDURE — 3066F NEPHROPATHY DOC TX: CPT | Mod: CPTII,,,

## 2024-04-04 PROCEDURE — 99213 OFFICE O/P EST LOW 20 MIN: CPT | Mod: PBBFAC,PN

## 2024-04-04 PROCEDURE — 1159F MED LIST DOCD IN RCRD: CPT | Mod: CPTII,,,

## 2024-04-04 PROCEDURE — 4010F ACE/ARB THERAPY RXD/TAKEN: CPT | Mod: CPTII,,,

## 2024-04-04 PROCEDURE — 3077F SYST BP >= 140 MM HG: CPT | Mod: CPTII,,,

## 2024-04-04 PROCEDURE — 3060F POS MICROALBUMINURIA REV: CPT | Mod: CPTII,,,

## 2024-04-04 PROCEDURE — 99999 PR PBB SHADOW E&M-EST. PATIENT-LVL III: CPT | Mod: PBBFAC,,,

## 2024-04-04 PROCEDURE — 1160F RVW MEDS BY RX/DR IN RCRD: CPT | Mod: CPTII,,,

## 2024-04-04 PROCEDURE — 97530 THERAPEUTIC ACTIVITIES: CPT | Mod: PN

## 2024-04-04 NOTE — PROGRESS NOTES
Post-op Note    HPI    Jarrod Mercado is here 4 weeks s/p the following procedure:     3/4/2024  Open reduction internal fixation right long finger middle phalanx  Extensor tendon repair right long finger  Removal of nail right thumb with repair of the nail bed  Irrigation debridement right thumb and long finger  Closed treatment right index finger middle phalanx    Overall doing well. Pain controlled on current regimen.  He has some concerns about occupational therapy.  He has been in a custom splint including his PIP and DIP but feels this is rubbing on his other fingers.  Denies any fevers, chills, nausea vomiting, drainage from incision.    DVT Prophylaxis: none      Physical Exam:     Patient is alert and oriented no acute distress.   Assistive Device:  Extension PIP and DIP splint    Right hand:  There is mild radial angulation to middle finger.  Thumb nailbed healing well without infectious signs.  Incision well healed and no sign of dehiscence.  He was unable to flex at his Dip or PIP of his long finger.  Third digital nerve intact to light touch. There is ulnar angulation of the third digit. Image uploaded to media tab.    Imaging:     I have personally reviewed the following imaging and these are an interpretation of my findings:     X-Ray: I have reviewed all pertinent results/findings and my personal findings are:  Interval screw fixation with improved anatomic alignment of 3rd middle phalanx fracture.  Similar appearance and alignment of nondisplaced 2nd middle phalanx fracture.  Remote fracture of the 5th metacarpal head-neck junction.  Incidental lunotriquetral coalition.  Punctate metallic foreign body adjacent the 3rd metacarpal head, similar.  Radiocarpal and base of thumb DJD. Agree with radiologist.    Assessment    Jarrod Mercado is 4 weeks Post-op     Plan:    Overall doing very well.  We discussed expectations of surgery and postoperative course.     Pain: Continued postoperative pain regimen --  percocet refill sent  PT/OT: Continue/Initiate physical therapy (weight bearing status: NWB x 2 weeks),  discussed with patient importance of attending physical therapy as this will increase his chances to return to full hand function.  He understands he will need to be compliant with occupational therapy as well as home exercises to regain motion and strength.    Follow-up: 2 weeks habashy   X-rays next visit: right hand

## 2024-04-04 NOTE — PROGRESS NOTES
"[facility]  Occupational Therapy Treatment Note     Date: 4/4/2024  Name: Jarrod Mercado  [facility] Number: [id number]    Therapy Diagnosis:   Encounter Diagnoses   Name Primary?    Displaced fracture of middle phalanx of finger of right hand Yes    Scar     Localized edema     Decreased range of motion of finger of right hand     Impaired instrumental activities of daily living (IADL)      Physician:Wilfrido Prado MD / Roma Anna, *  Physician Orders: Eval and Treat  Medical Diagnosis: S62.629A (ICD-10-CM) - Displaced fracture of middle phalanx of finger of right hand  Surgical Procedure and Date: 3/4/24   Open reduction internal fixation right long finger middle phalanx  Extensor tendon repair right long finger  Removal of nail right thumb with repair of the nail bed  Irrigation debridement right thumb and long finger  Closed treatment right index finger middle phalanx  Evaluation Date: 3/21/2024  Insurance Authorization Period Expiration: TBD (pending review as of 4/4/24)   Plan of Care Certification Period: 3/21/24-6/30/24  Progress Note Due: 4/21/24  Date of Return to MD: 4/24/24  Visit # / Visits authorized: 1/TBD  FOTO: Initial evaluation/15.4%     Precautions:  Standard; Pt is currently 31 days post-op      Time In: 10:58  Time Out: 11:30  Total Billable Time: 32 minutes    Subjective     Pt reports: "It hurts."     Note: Pt arrived nearly 15 minutes late for OT session.     He was compliant with home exercise program given last session.     Response to previous treatment: N/A    Functional change: N/A    Pain: 6/10-7/10  Location: right index & middle fingers    Objective     Objective Measures updated at progress report unless specified.    Edema. Measured in centimeters.    4/4/2024 4/4/2024     Right  Left    Index:         P1 8 7    PIP 7.3 6.8   P2 7 5.8    DIP 7 5.5   P3 5.8 5.4   Long:         P1 7.7 6.9    PIP 8.1 7   P2 7.8 6.1    DIP 6.5 5.8   P3 5.7 5.8    "   Hand/Wrist ROM. Measured in degrees    4/4/2024 4/4/2024     Right  Left            Index: MP  0/70 0/80              PIP     0/40 0/100              DIP 0/20 0/65              SMITH 130 (+15) 245           Long:  MP 0/80 0/80              PIP 0/35 0/100              DIP 0/10 0/70              SMITH 125 250           Thumb: MP 0/60 0/60                IP 0/70 0/70       Rad ADD/ABD  WNL       Pal ADD/ABD WNL WNL          Opposition 0 cm from 5th volar MP 0 cm from 5th volar MP      Wrist Flex/Ext/RD/UD: WNL and symmetrical bilaterally     Sensation:  Pt reports no paresthesias in his R digits  4/4/2024 4/4/2024     Right  Left    [place]       Normal 1.65-2.83       Diminished Light Touch 3.22-3.61       Diminished Protective 3.84-4.31       Loss of Protective 4.56-6.65       Untestable >6.65              Strength (Dyanmometer) and Pinch Strength (Pinch Gauge)- deferred for R  hand at this time   Measured in pounds and psi. Average of three trials.    4/4/2024 4/4/2024     Right  Left    Rung II  TBA  80.2   Zambrano Pinch TBA 24   3pt Pinch TBA 17    2pt Pinch TBA 13          Manual Muscle Testing- deferred at this time     Treatment     Jarrod received the treatments listed below:   Therapeutic activities to improve functional performance for 32  minutes, including:  -Additional objective measures obtained and goals updated   -Updated HEP provided w/ handouts and explanation of correct technique     Patient Education and Home Exercises     Education provided:   - Importance of compliance w/ HEP to maximize gains   - Progress towards goals     Written Home Exercises Provided: yes.  Exercises were reviewed and Jarrod was able to demonstrate them prior to the end of the session.  Jarrod demonstrated good  understanding of the HEP provided. See EMR under Patient Instructions for exercises provided during previous and today's therapy sessions.       Assessment     Pt would continue to benefit from skilled OT. Pt  tolerated session fairly, noting Pt agitated throughout today's session. I offered to re-schedule Pt's appointment if he was not feeling well. Pt declined. Pt noted minimal eye contact and verbalizations throughout but did confirm that he understand the home exercises provided as well as his current post-op precautions.      Jarrod is progressing well towards his goals and there have been updates to goals at this time. Pt prognosis is Good.     Pt will continue to benefit from skilled Outpatient Occupational Therapy to address the deficits listed in the problem list on initial evaluation provide Pt/family education and to maximize Pt's level of independence in the home and community environment.     Pt's spiritual, cultural and educational needs considered and pt agreeable to plan of care and goals.    Anticipated barriers to occupational therapy: None noted     ShortTerm Goals (to be met in 5 weeks or by 4/25/24):   1. Patient to be IND and compliant with HEP & attendance for duration of therapy.  2. Assess remaining digit edema, ROM & sensation first visit and amend LTG. Goal Met 4/4  3. Assess L hand /pinch strength and set goals for R hand.  Goal Met 4/4  4. Patient will report no more than 3/10 pain in R hand.         Updated Long Term Goals (to be met in 10 weeks or by DC):   1. Patient to be IND and compliant with HEP & attendance for duration of therapy.  2. Patient will demonstrate increased SMITH in his R long finger within 10-20 degrees of his L long finger to increase functional hand use for ADLs and IADLs.   3. Patient will demonstrate increased SMITH in his R index finger within 0-5 degrees of his L index finger to increase functional hand use for ADLs and IADLs.   4. Patient will report no more than 1/10 pain in R hand.   5. Patient will demonstrate increased L  strength within 20 lbs of his L .   6. Patient will demonstrate increased L pinches within 5-7 psi of his L hand.     Plan     Patient  is to be treated by Occupational Therapy 1-2 times per week for 10 weeks during the certification period from 3/21/24 to 6/30/24 to achieve the established goals.       Updates/Grading for next session: TBD pending progress       BLAKE Kaba

## 2024-04-11 ENCOUNTER — CLINICAL SUPPORT (OUTPATIENT)
Dept: REHABILITATION | Facility: HOSPITAL | Age: 54
End: 2024-04-11
Payer: MEDICAID

## 2024-04-11 DIAGNOSIS — R60.0 LOCALIZED EDEMA: ICD-10-CM

## 2024-04-11 DIAGNOSIS — L90.5 SCAR: Primary | ICD-10-CM

## 2024-04-11 DIAGNOSIS — M25.641 DECREASED RANGE OF MOTION OF FINGER OF RIGHT HAND: ICD-10-CM

## 2024-04-11 DIAGNOSIS — Z78.9 IMPAIRED INSTRUMENTAL ACTIVITIES OF DAILY LIVING (IADL): ICD-10-CM

## 2024-04-11 PROCEDURE — 97530 THERAPEUTIC ACTIVITIES: CPT | Mod: PN

## 2024-04-11 NOTE — PROGRESS NOTES
"[facility]  Occupational Therapy Treatment Note     Date: 4/11/2024  Name: Jarrod Mercado  [facility] Number: [id number]    Therapy Diagnosis:   Encounter Diagnoses   Name Primary?    Scar Yes    Localized edema     Decreased range of motion of finger of right hand     Impaired instrumental activities of daily living (IADL)      Physician:Wilfrido Prado MD / Roma Anna, *  Physician Orders: Eval and Treat  Medical Diagnosis: S62.629A (ICD-10-CM) - Displaced fracture of middle phalanx of finger of right hand  Surgical Procedure and Date: 3/4/24   Open reduction internal fixation right long finger middle phalanx  Extensor tendon repair right long finger  Removal of nail right thumb with repair of the nail bed  Irrigation debridement right thumb and long finger  Closed treatment right index finger middle phalanx  Evaluation Date: 3/21/2024  Insurance Authorization Period Expiration: 6/20/24  Plan of Care Certification Period: 3/21/24-6/30/24  Progress Note Due: 4/21/24  Date of Return to MD: 4/24/24  Visit # / Visits authorized: 2/20  FOTO: Initial evaluation/15.4%     Precautions:  Standard; Pt is currently 5 weeks post-op      Time In: 10:00  Time Out: 10:40  Total Billable Time: 40 minutes    Subjective     Pt reports: "It's getting there."     He was compliant with home exercise program given last session.     Response to previous treatment: N/A    Functional change: N/A    Pain: 5/10-6/10 (generalized)  Location: right index & middle fingers    Objective     Objective Measures updated at progress report unless specified.    Hand/Wrist ROM. Measured in degrees    4/4/2024 4/4/2024 4/11/2024     Right  Left  Right            Index: MP  0/70 0/80 0/80              PIP     0/40 0/100 0/70              DIP 0/20 0/65 0/30              SMITH 130 (+15) 245 180            Long:  MP 0/80 0/80 0/85              PIP 0/35 0/100 0/75              DIP 0/10 0/70 0/20              SMITH 125 250 180 "            Thumb: MP 0/60 0/60 0/60                IP 0/70 0/70 0/70       Rad ADD/ABD  WNL WNL       Pal ADD/ABD WNL WNL WNL          Opposition 0 cm from 5th volar MP 0 cm from 5th volar MP 0 cm from 5th volar MP      Treatment     Jarrod received the treatments listed below:   -All billed as Therapeutic Activities due to Medicaid insurance    Therapeutic activities to improve functional performance for 40 minutes, including:  -Additional objective measures obtained   -Fluidotherapy to R hand for 8 minutes to increase blood flow, circulation, desensitization, sensory re-education and for pain management. Performed the following therex within fluidotherapy: digit flexion, extension, ABD, and ADD  -Gentle PROM for index finger flexion: isolated MP/PIP/DIP joints and composite flexion x 5 min   -AAROM for long finger flexion & extension: MP/PIP/DIP joints and composite flexion & extension x 5 min  -Tendon glides: hook fist, flat fist, and composite fist x 25 reps each  -Review of HEP  -Rest breaks as needed     Patient Education and Home Exercises     Education provided:   - Importance of compliance w/ HEP to maximize gains   - Progress towards goals     Written Home Exercises Provided:Continue with previous HEP. Begin weaning long finger orthosis beginning next week. Increased pressure with PROM and AAROM each week as appropriate. Pain should not increase significantly with same.   Exercises were reviewed and Jarrod was able to demonstrate them prior to the end of the session.  Jarrod demonstrated good  understanding of the HEP provided. See EMR under Patient Instructions for exercises provided during previous and today's therapy sessions.       Assessment     Pt would continue to benefit from skilled OT. Pt tolerated session well, noting Pt significant increase in index and long finger SMITH over the past week. Pt reports decreased pain levels as well.     Jarrod is progressing well towards his goals and there have been  no updates to goals at this time. Pt prognosis is Good.     Pt will continue to benefit from skilled Outpatient Occupational Therapy to address the deficits listed in the problem list on initial evaluation provide Pt/family education and to maximize Pt's level of independence in the home and community environment.     Pt's spiritual, cultural and educational needs considered and pt agreeable to plan of care and goals.    Anticipated barriers to occupational therapy: None noted     ShortTerm Goals (to be met in 5 weeks or by 4/25/24):   1. Patient to be IND and compliant with HEP & attendance for duration of therapy.  2. Assess remaining digit edema, ROM & sensation first visit and amend LTG. Goal Met 4/4  3. Assess L hand /pinch strength and set goals for R hand.  Goal Met 4/4  4. Patient will report no more than 3/10 pain in R hand.         Updated Long Term Goals (to be met in 10 weeks or by DC):   1. Patient to be IND and compliant with HEP & attendance for duration of therapy.  2. Patient will demonstrate increased SMITH in his R long finger within 10-20 degrees of his L long finger to increase functional hand use for ADLs and IADLs.   3. Patient will demonstrate increased SMITH in his R index finger within 0-5 degrees of his L index finger to increase functional hand use for ADLs and IADLs.   4. Patient will report no more than 1/10 pain in R hand.   5. Patient will demonstrate increased L  strength within 20 lbs of his L .   6. Patient will demonstrate increased L pinches within 5-7 psi of his L hand.     Plan     Patient is to be treated by Occupational Therapy 1-2 times per week for 10 weeks during the certification period from 3/21/24 to 6/30/24 to achieve the established goals.       Updates/Grading for next session: TBD pending progress       BLAKE Kaba

## 2024-04-19 ENCOUNTER — DOCUMENTATION ONLY (OUTPATIENT)
Dept: REHABILITATION | Facility: HOSPITAL | Age: 54
End: 2024-04-19

## 2024-04-19 DIAGNOSIS — L90.5 SCAR: Primary | ICD-10-CM

## 2024-04-19 DIAGNOSIS — Z78.9 IMPAIRED INSTRUMENTAL ACTIVITIES OF DAILY LIVING (IADL): ICD-10-CM

## 2024-04-19 DIAGNOSIS — M25.641 DECREASED RANGE OF MOTION OF FINGER OF RIGHT HAND: ICD-10-CM

## 2024-04-19 DIAGNOSIS — R60.0 LOCALIZED EDEMA: ICD-10-CM

## 2024-04-19 NOTE — PROGRESS NOTES
Pt arrived 30 minutes late for his 10:00 am appointment on 4/19/24 noting that he thought the scheduled time was at 10:30 am. Pt also notes that he has a schedule print out available. Follow up scheduled for 4/30 at 9:15 am per Pt request.     MAXIMUS Kaba/L  [facility]

## 2024-04-24 ENCOUNTER — OFFICE VISIT (OUTPATIENT)
Dept: ORTHOPEDICS | Facility: CLINIC | Age: 54
End: 2024-04-24
Payer: MEDICAID

## 2024-04-24 VITALS
HEIGHT: 73 IN | BODY MASS INDEX: 29.52 KG/M2 | WEIGHT: 222.75 LBS | DIASTOLIC BLOOD PRESSURE: 91 MMHG | SYSTOLIC BLOOD PRESSURE: 159 MMHG | HEART RATE: 63 BPM

## 2024-04-24 DIAGNOSIS — S62.629A: Primary | ICD-10-CM

## 2024-04-24 PROCEDURE — 4010F ACE/ARB THERAPY RXD/TAKEN: CPT | Mod: CPTII,,, | Performed by: ORTHOPAEDIC SURGERY

## 2024-04-24 PROCEDURE — 3066F NEPHROPATHY DOC TX: CPT | Mod: CPTII,,, | Performed by: ORTHOPAEDIC SURGERY

## 2024-04-24 PROCEDURE — 3046F HEMOGLOBIN A1C LEVEL >9.0%: CPT | Mod: CPTII,,, | Performed by: ORTHOPAEDIC SURGERY

## 2024-04-24 PROCEDURE — 99213 OFFICE O/P EST LOW 20 MIN: CPT | Mod: PBBFAC,PN | Performed by: ORTHOPAEDIC SURGERY

## 2024-04-24 PROCEDURE — 3060F POS MICROALBUMINURIA REV: CPT | Mod: CPTII,,, | Performed by: ORTHOPAEDIC SURGERY

## 2024-04-24 PROCEDURE — 3080F DIAST BP >= 90 MM HG: CPT | Mod: CPTII,,, | Performed by: ORTHOPAEDIC SURGERY

## 2024-04-24 PROCEDURE — 1159F MED LIST DOCD IN RCRD: CPT | Mod: CPTII,,, | Performed by: ORTHOPAEDIC SURGERY

## 2024-04-24 PROCEDURE — 3077F SYST BP >= 140 MM HG: CPT | Mod: CPTII,,, | Performed by: ORTHOPAEDIC SURGERY

## 2024-04-24 PROCEDURE — 99024 POSTOP FOLLOW-UP VISIT: CPT | Mod: ,,, | Performed by: ORTHOPAEDIC SURGERY

## 2024-04-24 PROCEDURE — 99999 PR PBB SHADOW E&M-EST. PATIENT-LVL III: CPT | Mod: PBBFAC,,, | Performed by: ORTHOPAEDIC SURGERY

## 2024-04-24 NOTE — PROGRESS NOTES
Post-op Note    HPI    Jarrod Mercado is here 6 weeks s/p the following procedure:     3/4/2024  Open reduction internal fixation right long finger middle phalanx  Extensor tendon repair right long finger  Removal of nail right thumb with repair of the nail bed  Irrigation debridement right thumb and long finger  Closed treatment right index finger middle phalanx    Overall doing well.  In occupational hand therapy.  Overall improving.  No signs or symptoms of infection.  No fevers chills drainage.      Physical Exam:     Patient is alert and oriented no acute distress.   Assistive Device:  Extension PIP and DIP splint    Right hand:  There is mild radial angulation to middle finger.  Thumb nailbed healed well without infectious signs.  Incision well healed and no sign of dehiscence to the long finger.  Supple motion of the PIP joint although restricted due to joint contracture.  Third digital nerve intact to light touch.     Imaging:     I have personally reviewed the following imaging and these are an interpretation of my findings:     X-Ray: I have reviewed all pertinent results/findings and my personal findings are:  Interval screw fixation with improved anatomic alignment of 3rd middle phalanx fracture.  Similar appearance and alignment of nondisplaced 2nd middle phalanx fracture.  Remote fracture of the 5th metacarpal head-neck junction.  Incidental lunotriquetral coalition.  Punctate metallic foreign body adjacent the 3rd metacarpal head, similar.  Radiocarpal and base of thumb DJD. Agree with radiologist.    Assessment    Jarrod Mercado is 6 weeks Post-op     Plan:    Overall doing very well.  We discussed expectations of surgery and postoperative course.     Pain: Continued postoperative pain regimen   PT/OT: Continue/Initiate physical therapy (weight bearing status: NWB but full range of motion as tolerated), okay to discontinue splint and use for comfort.  Okay to siva tape if necessary    Follow up 6  weeks  X-rays next visit: right hand

## 2024-04-29 NOTE — PROGRESS NOTES
"[facility]  Occupational Therapy Progress & Treatment Note     Date: 4/30/2024  Name: Jarrod Mercado  Clinic Number: [id number]    Therapy Diagnosis:   Encounter Diagnoses   Name Primary?    Displaced fracture of middle phalanx of finger of right hand Yes    Scar     Localized edema     Decreased range of motion of finger of right hand     Impaired instrumental activities of daily living (IADL)      Physician:Wilfrido Prado MD / Roma Anna, *  Physician Orders: Eval and Treat  Medical Diagnosis: S62.629A (ICD-10-CM) - Displaced fracture of middle phalanx of finger of right hand  Surgical Procedure and Date: 3/4/24   Open reduction internal fixation right long finger middle phalanx  Extensor tendon repair right long finger  Removal of nail right thumb with repair of the nail bed  Irrigation debridement right thumb and long finger  Closed treatment right index finger middle phalanx  Evaluation Date: 3/21/2024  Insurance Authorization Period Expiration: 6/20/24  Plan of Care Certification Period: 3/21/24-6/30/24  Progress Note Due: 4/21/24  Date of Return to MD: 6/26/24  Visit # / Visits authorized: 3/20  FOTO: Initial evaluation/15.4%; 4th visit/19.2%     Precautions:  Standard; Pt is currently 7.5 weeks post-op; R hand NWB, DC splint (wear for comfort as needed), buddy tape if needed     Time In: 09:15  Time Out: 09:53  Total Billable Time: 38 minutes    Subjective     Pt reports: "I'm still having pain in both of the fingers. I don't try to push on it at all. The tip of my thumb still hurts to the touch- it's been like that the whole time."    He was compliant with home exercise program given last session.     Response to previous treatment: favorable    Functional change: N/A    Pain: 6/10 (generalized)  Location: right index & middle fingers    Objective     Objective Measures updated at progress report unless specified.    EXAMINATION:  XR HAND COMPLETE 3 VIEW RIGHT   "   CLINICAL HISTORY:  Displaced fracture of middle phalanx of the right hand.     COMPARISON:  Radiographs 04/04/2024, 03/21/2024.     TECHNIQUE:  PA, lateral and oblique views of the right hand were submitted for evaluation.     FINDINGS:  Healing fracture of the 3rd middle phalanx status post ORIF.  Stable radiographic appearance of the 2nd middle phalanx fracture.  No new fractures.  Remote healed fracture of the 5th metacarpal.  No suspicious lytic or blastic lesions.  Radioscaphoid and triscaphe osteoarthritis.  Slight boutonniere deformity of the 5th digit.  Lunotriquetral coalition.  Radiopaque foreign body projecting over the subcutaneous soft tissues dorsal to the 3rd MCP joint.  Soft tissue swelling about the 2nd and 3rd digits.    Hand/Wrist ROM. Measured in degrees    4/4/2024 4/4/2024 4/11/2024 4/30/2024     Right  Left  Right Right              Index: MP  0/70 0/80 0/80 0/80              PIP     0/40 0/100 0/70 0/75              DIP 0/20 0/65 0/30 0/30              SMITH 130 (+15) 245 180 185             Long:  MP 0/80 0/80 0/85 0/85              PIP 0/35 0/100 0/75 0/75              DIP 0/10 0/70 0/20 0/20              SMITH 125 250 180 180             Thumb: MP 0/60 0/60 0/60 0/70                IP 0/70 0/70 0/70 0/75       Rad ADD/ABD  WNL WNL WNL       Pal ADD/ABD WNL WNL WNL WNL          Opposition 0 cm from 5th volar MP 0 cm from 5th volar MP 0 cm from 5th volar MP 0 cm from 5th volar MP        Strength (Dyanmometer) and Pinch Strength (Pinch Gauge)  Measured in pounds and psi. Average of three trials.    4/30/2024 4/30/2024     Right  Left    Rung II 51.5 101.1   Zambrano Pinch  20 24   3pt Pinch   12   20    2pt Pinch  12 17       CMS Impairment/Limitation/Restriction for FOTO Hand Survey  Therapist reviewed FOTO scores for Jarrod Mercado on 4/30/24  FOTO documents entered into [employer] - see Media section.     Limitation Score: 15.4%  Category: ADLs and IADLs     Treatment     Jarrod received the  treatments listed below:     -All billed as Therapeutic Activities due to Medicaid insurance    Re-Assessment performed with measurements and report taken, goals updated x 20 minutes      Therapeutic activities to improve functional performance for 18 minutes, including:  -Update HEP focusing on gentle PROM for composite flexion  -Review of post-op healing timelines and activity precautions     Patient Education and Home Exercises     Education provided:   - Importance of compliance w/ HEP to maximize gains   - Progress towards goals     Written Home Exercises Provided: Yes.   Exercises were reviewed and Jarrod was able to demonstrate them prior to the end of the session.  Jarrod demonstrated good  understanding of the HEP provided. See EMR under Patient Instructions for exercises provided during today's therapy session.       Assessment     Pt would continue to benefit from skilled OT. Pt is making slow progress towards established goals, noting normative R thumb ROM demonstrated, with continued ROM deficits with index and long finger due to pain with motion. Good baseline  and functional pinch strength demonstrated today.     Jarrod is progressing well towards his goals and there have been updates to goals at this time. Pt prognosis is Good.     Pt will continue to benefit from skilled Outpatient Occupational Therapy to address the deficits listed in the problem list on initial evaluation provide Pt/family education and to maximize Pt's level of independence in the home and community environment.     Pt's spiritual, cultural and educational needs considered and pt agreeable to plan of care and goals.    Anticipated barriers to occupational therapy: None noted     ShortTerm Goals (to be met in 5 weeks or by 4/25/24):   1. Patient to be IND and compliant with HEP & attendance for duration of therapy. In progress 4/30  2. Assess remaining digit edema, ROM & sensation first visit and amend LTG. Goal Met 4/4  3.  Assess L hand /pinch strength and set goals for R hand.  Goal Met 4/4  4. Patient will report no more than 3/10 pain in R hand. In progress 4/30        Updated Long Term Goals (to be met in 10 weeks or by DC):   1. Patient to be IND and compliant with HEP & attendance for duration of therapy.  2. Patient will demonstrate increased SMITH in his R long finger within 10-20 degrees of his L long finger to increase functional hand use for ADLs and IADLs. In progress 4/30  3. Patient will demonstrate increased SMITH in his R index finger within 15 degrees of his L index finger to increase functional hand use for ADLs and IADLs. In progress 4/30   4. Patient will report no more than 1/10 pain in R hand. In progress 4/30  5. Patient will demonstrate increased L  strength within 20 lbs of his L . In progress 4/30   6. Patient will demonstrate increased L pinches within 5-7 psi of his L hand. In progress 4/30    Plan     Patient is to be treated by Occupational Therapy 1 time per week for 10 weeks during the certification period from 3/21/24 to 6/30/24 to achieve the established goals.       Updates/Grading for next session: TBD pending progress       BLAKE Kaba

## 2024-04-30 ENCOUNTER — CLINICAL SUPPORT (OUTPATIENT)
Dept: REHABILITATION | Facility: HOSPITAL | Age: 54
End: 2024-04-30
Payer: MEDICAID

## 2024-04-30 DIAGNOSIS — S62.629A: Primary | ICD-10-CM

## 2024-04-30 DIAGNOSIS — Z78.9 IMPAIRED INSTRUMENTAL ACTIVITIES OF DAILY LIVING (IADL): ICD-10-CM

## 2024-04-30 DIAGNOSIS — R60.0 LOCALIZED EDEMA: ICD-10-CM

## 2024-04-30 DIAGNOSIS — M25.641 DECREASED RANGE OF MOTION OF FINGER OF RIGHT HAND: ICD-10-CM

## 2024-04-30 DIAGNOSIS — L90.5 SCAR: ICD-10-CM

## 2024-04-30 PROCEDURE — 97530 THERAPEUTIC ACTIVITIES: CPT | Mod: PN

## 2024-05-13 NOTE — PROGRESS NOTES
"[facility]  Occupational Therapy Progress & Treatment Note     Date: 5/14/2024  Name: Jarrod Mercado  Clinic Number: [id number]    Therapy Diagnosis:   Encounter Diagnoses   Name Primary?    Displaced fracture of middle phalanx of finger of right hand Yes    Scar     Localized edema     Decreased range of motion of finger of right hand     Impaired instrumental activities of daily living (IADL)      Physician:Wilfrido Prado MD / Roma Anna, *  Physician Orders: Eval and Treat  Medical Diagnosis: S62.629A (ICD-10-CM) - Displaced fracture of middle phalanx of finger of right hand  Surgical Procedure and Date: 3/4/24   Open reduction internal fixation right long finger middle phalanx  Extensor tendon repair right long finger  Removal of nail right thumb with repair of the nail bed  Irrigation debridement right thumb and long finger  Closed treatment right index finger middle phalanx  Evaluation Date: 3/21/2024  Insurance Authorization Period Expiration: 6/20/24  Plan of Care Certification Period: 3/21/24-6/30/24  Progress Note Due: 5/30/24  Date of Return to MD: 6/26/24  Visit # / Visits authorized: 4/20  FOTO: Initial evaluation/15.4%; 4th visit/19.2%     Precautions:  Standard; Pt is currently 9.5 weeks post-op; R hand NWB, DC splint (wear for comfort as needed), buddy tape if needed     Time In: 07:30  Time Out: 08:08  Total Billable Time: 38 minutes    Subjective     Pt reports: "I get some shooting pains in my middle finger- mainly at night. I'm scared to use my hand still- the feeling is coming back."     He was compliant with home exercise program given last session.    Response to previous treatment: favorable    Functional change: Increased functional use of hand with work-related and daily household management tasks     Pain: 5/10 (generalized)  Location: right middle fingers    Objective     Objective Measures updated at progress report unless specified.    Hand/Wrist " ROM. Measured in degrees    4/4/2024 4/4/2024 4/11/2024 4/30/2024 5/14/2024     Right  Left  Right Right  Right              Index: MP  0/70 0/80 0/80 0/80 0/80              PIP     0/40 0/100 0/70 0/75 0/80              DIP 0/20 0/65 0/30 0/30 0/40              SMITH 130 (+15) 245 180 185 200              Long:  MP 0/80 0/80 0/85 0/85 0/85              PIP 0/35 0/100 0/75 0/75 0/85              DIP 0/10 0/70 0/20 0/20 0/20              SMITH 125 250 180 180 190              Thumb: MP 0/60 0/60 0/60 0/70 0/70                IP 0/70 0/70 0/70 0/75 0/75       Rad ADD/ABD  WNL WNL WNL WNL       Pal ADD/ABD WNL WNL WNL WNL WNL          Opposition 0 cm from 5th volar MP 0 cm from 5th volar MP 0 cm from 5th volar MP 0 cm from 5th volar MP  0 cm from 5th volar MP        Strength (Dyanmometer) and Pinch Strength (Pinch Gauge)  Measured in pounds and psi. Average of three trials.    4/30/2024 4/30/2024 5/14/2024     Right  Left  Right   Rung II 51.5 101.1 66.1   Key Pinch  20 24 22   3pt Pinch   12   20  16   2pt Pinch  12 17 14       CMS Impairment/Limitation/Restriction for FOTO Hand Survey  Therapist reviewed FOTO scores for Jarrod Mercado on 5/14/24  FOTO documents entered into [employer] - see Media section.  Limitation Score: 15.4%  Category: ADLs and IADLs     Treatment     Jarrod received the treatments listed below:     -All billed as Therapeutic Activities due to Medicaid insurance    Re-Assessment performed with measurements and report taken, goals updated x 15 minutes      Therapeutic activities to improve functional performance for 23 minutes, including:  -Update HEP focusing on gentle PROM for DIP flexion, resisted composite fist and hook fist, EDC strengthening (handouts provided)   -Review of post-op healing timelines and activity precautions     Patient Education and Home Exercises     Education provided:   - Importance of compliance w/ HEP to maximize gains   - Progress towards goals     Written Home Exercises  Provided: Yes. Red Theraputty provided today.   Exercises were reviewed and Jarrod was able to demonstrate them prior to the end of the session.  Jarrod demonstrated good  understanding of the HEP provided. See EMR under Patient Instructions for exercises provided during today's therapy session.       Assessment     Pt is making slow progress towards all LTG established for therapy, noting increased SMITH in both index and long finger ROM, as well as increased  and functional pinch strengths. Pt would continue to benefit from skilled OT.  Jarrod is progressing well towards his goals and there have been updates to goals at this time. Pt prognosis is Good.     Pt will continue to benefit from skilled Outpatient Occupational Therapy to address the deficits listed in the problem list on initial evaluation provide Pt/family education and to maximize Pt's level of independence in the home and community environment.     Pt's spiritual, cultural and educational needs considered and pt agreeable to plan of care and goals.    Anticipated barriers to occupational therapy: None noted     ShortTerm Goals (to be met in 5 weeks or by 4/25/24):   1. Patient to be IND and compliant with HEP & attendance for duration of therapy. In progress 4/30  2. Assess remaining digit edema, ROM & sensation first visit and amend LTG. Goal Met 4/4  3. Assess L hand /pinch strength and set goals for R hand.  Goal Met 4/4  4. Patient will report no more than 3/10 pain in R hand. In progress 4/30        Updated Long Term Goals (to be met in 10 weeks or by DC):   1. Patient to be IND and compliant with HEP & attendance for duration of therapy .In progress 5/14   2. Patient will demonstrate increased SMITH in his R long finger within 25-30 degrees of his L long finger to increase functional hand use for ADLs and IADLs. In progress 5/14  3. Patient will demonstrate increased SMITH in his R index finger within 20-25 degrees of his L index finger to  increase functional hand use for ADLs and IADLs. In progress 5/14   4. Patient will report no more than 3/10 pain in R hand. In progress 5/14  5. Patient will demonstrate increased L  strength within 30 lbs of his L . In progress 5/14  6. Patient will demonstrate increased L pinches within 5-7 psi of his L hand. Goal Met 5/14    Plan     Patient is to be treated by Occupational Therapy 1 time per week for 10 weeks during the certification period from 3/21/24 to 6/30/24 to achieve the established goals.       Updates/Grading for next session: TBD pending progress       BLAKE Kaba

## 2024-05-14 ENCOUNTER — CLINICAL SUPPORT (OUTPATIENT)
Dept: REHABILITATION | Facility: HOSPITAL | Age: 54
End: 2024-05-14
Payer: MEDICAID

## 2024-05-14 DIAGNOSIS — S62.629A: Primary | ICD-10-CM

## 2024-05-14 DIAGNOSIS — M25.641 DECREASED RANGE OF MOTION OF FINGER OF RIGHT HAND: ICD-10-CM

## 2024-05-14 DIAGNOSIS — Z78.9 IMPAIRED INSTRUMENTAL ACTIVITIES OF DAILY LIVING (IADL): ICD-10-CM

## 2024-05-14 DIAGNOSIS — L90.5 SCAR: ICD-10-CM

## 2024-05-14 DIAGNOSIS — R60.0 LOCALIZED EDEMA: ICD-10-CM

## 2024-05-14 PROCEDURE — 97530 THERAPEUTIC ACTIVITIES: CPT | Mod: PN

## 2024-05-28 DIAGNOSIS — N18.30 DIABETES MELLITUS DUE TO UNDERLYING CONDITION WITH STAGE 3 CHRONIC KIDNEY DISEASE, WITHOUT LONG-TERM CURRENT USE OF INSULIN, UNSPECIFIED WHETHER STAGE 3A OR 3B CKD: ICD-10-CM

## 2024-05-28 DIAGNOSIS — E08.22 DIABETES MELLITUS DUE TO UNDERLYING CONDITION WITH STAGE 3 CHRONIC KIDNEY DISEASE, WITHOUT LONG-TERM CURRENT USE OF INSULIN, UNSPECIFIED WHETHER STAGE 3A OR 3B CKD: ICD-10-CM

## 2024-05-28 RX ORDER — GLIMEPIRIDE 4 MG/1
4 TABLET ORAL
Qty: 90 TABLET | Refills: 0 | Status: SHIPPED | OUTPATIENT
Start: 2024-05-28

## 2024-06-03 NOTE — PROGRESS NOTES
"[facility]  Occupational Therapy Progress Note/Discharge Summary     Date: 6/4/2024  Name: Jarrod Mercado  Clinic Number: [id number]    Therapy Diagnosis:   Encounter Diagnoses   Name Primary?    Displaced fracture of middle phalanx of finger of right hand Yes    Scar     Localized edema     Decreased range of motion of finger of right hand     Impaired instrumental activities of daily living (IADL)      Physician:Wilfrido Prado MD / Roma Anna, *  Physician Orders: Eval and Treat  Medical Diagnosis: S62.629A (ICD-10-CM) - Displaced fracture of middle phalanx of finger of right hand  Surgical Procedure and Date: 3/4/24   Open reduction internal fixation right long finger middle phalanx  Extensor tendon repair right long finger  Removal of nail right thumb with repair of the nail bed  Irrigation debridement right thumb and long finger  Closed treatment right index finger middle phalanx  Evaluation Date: 3/21/2024  Insurance Authorization Period Expiration: 6/20/24  Plan of Care Certification Period: 3/21/24-6/30/24  Progress Note Due: 5/30/24  Date of Return to MD: 6/26/24  Visit # / Visits authorized: 5/20  FOTO: Initial evaluation/15.4%; 4th visit/19.2%     Precautions:  Standard; Pt is currently 13 weeks post-op; DC splint (wear for comfort as needed)     Time In: 07:45  Time Out: 08:10  Total Billable Time: 25 minutes    Subjective     Pt reports: "I'm able to do my work fine- I just have some limitations. I just can't make a full fist. When I  certain things- I feel it in my fingers"    He was compliant with home exercise program given last session.    Response to previous treatment: favorable    Functional change: Increased functional use of hand with work-related and daily household management tasks     Pain: 3/10 (generalized)  Location: right middle fingers    Objective     Objective Measures updated at progress report unless specified.    Hand/Wrist ROM. " Measured in degrees    4/4/2024 4/4/2024 4/11/2024 4/30/2024 5/14/2024 6/4/2024     Right  Left  Right Right  Right Right                Index: MP  0/70 0/80 0/80 0/80 0/80 0/90              PIP     0/40 0/100 0/70 0/75 0/80 0/85              DIP 0/20 0/65 0/30 0/30 0/40 0/40              SMITH 130 (+15) 245 180 185 200 215               Long:  MP 0/80 0/80 0/85 0/85 0/85 0/90              PIP 0/35 0/100 0/75 0/75 0/85 0/75              DIP 0/10 0/70 0/20 0/20 0/20 0/20              SMITH 125 250 180 180 190 185               Thumb: MP 0/60 0/60 0/60 0/70 0/70 0/70                IP 0/70 0/70 0/70 0/75 0/75 0/75       Rad ADD/ABD  WNL WNL WNL WNL WNL       Pal ADD/ABD WNL WNL WNL WNL WNL WNL          Opposition 0 cm from 5th volar MP 0 cm from 5th volar MP 0 cm from 5th volar MP 0 cm from 5th volar MP  0 cm from 5th volar MP  0 cm from 5th volar MP        Strength (Dyanmometer) and Pinch Strength (Pinch Gauge)  Measured in pounds and psi. Average of three trials.    4/30/2024 4/30/2024 5/14/2024 6/4/2024     Right  Left  Right Right    Rung II 51.5 101.1 66.1 71.2   [place] Pinch  20 24 22 21   3pt Pinch   12   20  16 16   2pt Pinch  12 17 14 16       Treatment     Jarrod received the treatments listed below:     -All billed as Therapeutic Activities due to Medicaid insurance    Re-Assessment performed with measurements and report taken, goals updated, home program reviewed x 25 minutes      Patient Education and Home Exercises     Education provided:   - Importance of compliance w/ HEP to maximize gains   - Progress towards goals     Written Home Exercises Provided: Continue with today's amended HEP.   Exercises were reviewed and Jarrod was able to demonstrate them prior to the end of the session. Jarrod demonstrated good  understanding of the HEP provided. See EMR under Patient Instructions for exercises provided during today's therapy session.       Assessment     Pt is making steady progress towards all LTG established  for therapy, noting majority of ROM gains achieved for index and long fingers within the first 4-6 weeks of initiating therapy, with plateau noted in long finger mainly with DIP flexion due to ORL attenuation likely as well as overall decreased tissue extensibility through the dorsal aspect of P2. Pt encouraged to continue at home daily with heat modality and PROM stretching and/or dynamic composite flexion taping method to gradually restore his composite fist. Pt verbalized agreement with same.     Pt's spiritual, cultural and educational needs considered and pt agreeable to plan of care and goals.    ShortTerm Goals (to be met in 5 weeks or by 4/25/24):   1. Patient to be IND and compliant with HEP & attendance for duration of therapy. In progress 4/30  2. Assess remaining digit edema, ROM & sensation first visit and amend LTG. Goal Met 4/4  3. Assess L hand /pinch strength and set goals for R hand.  Goal Met 4/4  4. Patient will report no more than 3/10 pain in R hand. In progress 4/30        Updated Long Term Goals (to be met in 10 weeks or by DC):   1. Patient to be IND and compliant with HEP & attendance for duration of therapy Goal Met 6/4  2. Patient will demonstrate increased SMITH in his R long finger within 25-30 degrees of his L long finger to increase functional hand use for ADLs and IADLs. In progress 6/4  3. Patient will demonstrate increased SMITH in his R index finger within 20-25 degrees of his L index finger to increase functional hand use for ADLs and IADLs. Nearly Met 6/4  4. Patient will report no more than 3/10 pain in R hand. Goal Met 6/4  5. Patient will demonstrate increased L  strength within 30 lbs of his L . Goal Met 6/4  6. Patient will demonstrate increased L pinches within 5-7 psi of his L hand. Goal Met 5/14    Plan     Patient is appropriate for discharge from Outpatient Occupational Therapy at this time and will continue at home with HEP.      BLAKE Kaba

## 2024-06-04 ENCOUNTER — CLINICAL SUPPORT (OUTPATIENT)
Dept: REHABILITATION | Facility: HOSPITAL | Age: 54
End: 2024-06-04
Payer: MEDICAID

## 2024-06-04 DIAGNOSIS — L90.5 SCAR: ICD-10-CM

## 2024-06-04 DIAGNOSIS — Z78.9 IMPAIRED INSTRUMENTAL ACTIVITIES OF DAILY LIVING (IADL): ICD-10-CM

## 2024-06-04 DIAGNOSIS — R60.0 LOCALIZED EDEMA: ICD-10-CM

## 2024-06-04 DIAGNOSIS — S62.629A: Primary | ICD-10-CM

## 2024-06-04 DIAGNOSIS — M25.641 DECREASED RANGE OF MOTION OF FINGER OF RIGHT HAND: ICD-10-CM

## 2024-06-04 PROCEDURE — 97530 THERAPEUTIC ACTIVITIES: CPT | Mod: PN

## 2024-06-04 NOTE — PATIENT INSTRUCTIONS
Patient instructed on the following:  -Resume all normal use of R hand with ADLs and IADLs  -Continue heat modality use PRN to reduce stiffness and facilitate increased tissue extensibility as preparatory method prior to PROM exercises throughout the day or prior to dynamic flexion taping method discussed today (3 x daily, 15-30 minutes each)

## 2024-06-12 ENCOUNTER — HOSPITAL ENCOUNTER (EMERGENCY)
Facility: HOSPITAL | Age: 54
Discharge: HOME | End: 2024-06-12
Attending: EMERGENCY MEDICINE
Payer: MEDICAID

## 2024-06-12 VITALS
BODY MASS INDEX: 29.27 KG/M2 | HEIGHT: 73 IN | SYSTOLIC BLOOD PRESSURE: 137 MMHG | DIASTOLIC BLOOD PRESSURE: 83 MMHG | WEIGHT: 220.88 LBS | HEART RATE: 76 BPM | OXYGEN SATURATION: 99 % | RESPIRATION RATE: 18 BRPM | TEMPERATURE: 98 F

## 2024-06-12 DIAGNOSIS — R73.9 HYPERGLYCEMIA: ICD-10-CM

## 2024-06-12 DIAGNOSIS — T78.40XA ALLERGIC REACTION, INITIAL ENCOUNTER: ICD-10-CM

## 2024-06-12 DIAGNOSIS — T78.3XXA ANGIOEDEMA, INITIAL ENCOUNTER: Primary | ICD-10-CM

## 2024-06-12 DIAGNOSIS — I10 HTN (HYPERTENSION): ICD-10-CM

## 2024-06-12 LAB
ALBUMIN SERPL-MCNC: 4.2 G/DL (ref 3.3–5.5)
ALLENS TEST: ABNORMAL
ALP SERPL-CCNC: 88 U/L (ref 42–141)
BILIRUB SERPL-MCNC: 0.6 MG/DL (ref 0.2–1.6)
BILIRUBIN, POC UA: NEGATIVE
BLOOD, POC UA: ABNORMAL
BUN SERPL-MCNC: 16 MG/DL (ref 7–22)
CALCIUM SERPL-MCNC: 10.2 MG/DL (ref 8–10.3)
CHLORIDE SERPL-SCNC: 103 MMOL/L (ref 98–108)
CLARITY, POC UA: CLEAR
COLOR, POC UA: YELLOW
CREAT SERPL-MCNC: 1.4 MG/DL (ref 0.6–1.2)
DELSYS: ABNORMAL
GLUCOSE SERPL-MCNC: 336 MG/DL (ref 73–118)
GLUCOSE, POC UA: NEGATIVE
HCO3 UR-SCNC: 25.5 MMOL/L (ref 24–28)
HCT, POC: NORMAL
HGB, POC: NORMAL (ref 14–18)
KETONES, POC UA: NEGATIVE
LDH SERPL L TO P-CCNC: 1.26 MMOL/L (ref 0.5–2.2)
LEUKOCYTE EST, POC UA: NEGATIVE
MCH, POC: NORMAL
MCHC, POC: NORMAL
MCV, POC: NORMAL
MODE: ABNORMAL
MPV, POC: NORMAL
NITRITE, POC UA: NEGATIVE
OHS QRS DURATION: 92 MS
OHS QTC CALCULATION: 430 MS
PCO2 BLDA: 46.7 MMHG (ref 35–45)
PH SMN: 7.35 [PH] (ref 7.35–7.45)
PH UR STRIP: 5.5 [PH]
PO2 BLDA: 56 MMHG (ref 40–60)
POC ALT (SGPT): 17 U/L (ref 10–47)
POC AST (SGOT): 19 U/L (ref 11–38)
POC BE: -1 MMOL/L
POC CARDIAC TROPONIN I: 0 NG/ML (ref 0–0.08)
POC PLATELET COUNT: NORMAL
POC SATURATED O2: 87 % (ref 95–100)
POC TCO2: 27 MMOL/L (ref 24–29)
POC TCO2: 28 MMOL/L (ref 18–33)
POCT GLUCOSE: 292 MG/DL (ref 70–110)
POCT GLUCOSE: 306 MG/DL (ref 70–110)
POCT GLUCOSE: 321 MG/DL (ref 70–110)
POTASSIUM BLD-SCNC: 4.4 MMOL/L (ref 3.6–5.1)
PROTEIN, POC UA: ABNORMAL
PROTEIN, POC: 7.8 G/DL (ref 6.4–8.1)
RBC, POC: NORMAL
RDW, POC: NORMAL
SAMPLE: ABNORMAL
SAMPLE: NORMAL
SITE: ABNORMAL
SODIUM BLD-SCNC: 144 MMOL/L (ref 128–145)
SPECIFIC GRAVITY, POC UA: 1.02
UROBILINOGEN, POC UA: 0.2 E.U./DL
WBC, POC: NORMAL

## 2024-06-12 PROCEDURE — 85025 COMPLETE CBC W/AUTO DIFF WBC: CPT | Mod: ER

## 2024-06-12 PROCEDURE — 63600175 PHARM REV CODE 636 W HCPCS: Mod: ER | Performed by: EMERGENCY MEDICINE

## 2024-06-12 PROCEDURE — 93005 ELECTROCARDIOGRAM TRACING: CPT | Mod: ER

## 2024-06-12 PROCEDURE — 99291 CRITICAL CARE FIRST HOUR: CPT | Mod: ER

## 2024-06-12 PROCEDURE — 80053 COMPREHEN METABOLIC PANEL: CPT | Mod: ER

## 2024-06-12 PROCEDURE — 81003 URINALYSIS AUTO W/O SCOPE: CPT | Mod: ER

## 2024-06-12 PROCEDURE — 96372 THER/PROPH/DIAG INJ SC/IM: CPT | Performed by: EMERGENCY MEDICINE

## 2024-06-12 PROCEDURE — 82803 BLOOD GASES ANY COMBINATION: CPT | Mod: ER

## 2024-06-12 PROCEDURE — 96361 HYDRATE IV INFUSION ADD-ON: CPT | Mod: ER

## 2024-06-12 PROCEDURE — 93010 ELECTROCARDIOGRAM REPORT: CPT | Mod: ,,, | Performed by: INTERNAL MEDICINE

## 2024-06-12 PROCEDURE — 99900035 HC TECH TIME PER 15 MIN (STAT): Mod: ER

## 2024-06-12 PROCEDURE — 96375 TX/PRO/DX INJ NEW DRUG ADDON: CPT | Mod: ER

## 2024-06-12 PROCEDURE — 82962 GLUCOSE BLOOD TEST: CPT | Mod: ER

## 2024-06-12 PROCEDURE — 25000003 PHARM REV CODE 250: Mod: ER | Performed by: EMERGENCY MEDICINE

## 2024-06-12 PROCEDURE — 96374 THER/PROPH/DIAG INJ IV PUSH: CPT | Mod: ER

## 2024-06-12 RX ORDER — FAMOTIDINE 20 MG/1
20 TABLET, FILM COATED ORAL 2 TIMES DAILY
Qty: 20 TABLET | Refills: 0 | Status: SHIPPED | OUTPATIENT
Start: 2024-06-12 | End: 2025-06-12

## 2024-06-12 RX ORDER — CETIRIZINE HYDROCHLORIDE 10 MG/1
10 TABLET ORAL
Status: COMPLETED | OUTPATIENT
Start: 2024-06-12 | End: 2024-06-12

## 2024-06-12 RX ORDER — FAMOTIDINE 10 MG/ML
40 INJECTION INTRAVENOUS
Status: COMPLETED | OUTPATIENT
Start: 2024-06-12 | End: 2024-06-12

## 2024-06-12 RX ORDER — EPINEPHRINE 0.3 MG/.3ML
1 INJECTION SUBCUTANEOUS ONCE
Qty: 0.3 ML | Refills: 0 | Status: SHIPPED | OUTPATIENT
Start: 2024-06-12 | End: 2024-06-12

## 2024-06-12 RX ORDER — LOSARTAN POTASSIUM 25 MG/1
50 TABLET ORAL
Status: COMPLETED | OUTPATIENT
Start: 2024-06-12 | End: 2024-06-12

## 2024-06-12 RX ORDER — EPINEPHRINE 0.3 MG/.3ML
0.3 INJECTION SUBCUTANEOUS
Status: COMPLETED | OUTPATIENT
Start: 2024-06-12 | End: 2024-06-12

## 2024-06-12 RX ORDER — CETIRIZINE HYDROCHLORIDE 10 MG/1
10 TABLET ORAL DAILY
Qty: 30 TABLET | Refills: 0 | Status: SHIPPED | OUTPATIENT
Start: 2024-06-12 | End: 2025-06-12

## 2024-06-12 RX ORDER — DIPHENHYDRAMINE HCL 25 MG
25 CAPSULE ORAL EVERY 6 HOURS PRN
Qty: 20 CAPSULE | Refills: 0 | Status: SHIPPED | OUTPATIENT
Start: 2024-06-12

## 2024-06-12 RX ORDER — PREDNISONE 20 MG/1
40 TABLET ORAL DAILY
Qty: 10 TABLET | Refills: 0 | Status: SHIPPED | OUTPATIENT
Start: 2024-06-12 | End: 2024-06-17

## 2024-06-12 RX ORDER — METHYLPREDNISOLONE SOD SUCC 125 MG
125 VIAL (EA) INJECTION
Status: COMPLETED | OUTPATIENT
Start: 2024-06-12 | End: 2024-06-12

## 2024-06-12 RX ORDER — DIPHENHYDRAMINE HYDROCHLORIDE 50 MG/ML
25 INJECTION INTRAMUSCULAR; INTRAVENOUS
Status: COMPLETED | OUTPATIENT
Start: 2024-06-12 | End: 2024-06-12

## 2024-06-12 RX ADMIN — SODIUM CHLORIDE 1000 ML: 9 INJECTION, SOLUTION INTRAVENOUS at 08:06

## 2024-06-12 RX ADMIN — DIPHENHYDRAMINE HYDROCHLORIDE 25 MG: 50 INJECTION INTRAMUSCULAR; INTRAVENOUS at 08:06

## 2024-06-12 RX ADMIN — LOSARTAN POTASSIUM 50 MG: 25 TABLET, FILM COATED ORAL at 09:06

## 2024-06-12 RX ADMIN — EPINEPHRINE 0.3 MG: 0.3 INJECTION INTRAMUSCULAR at 08:06

## 2024-06-12 RX ADMIN — FAMOTIDINE 40 MG: 10 INJECTION, SOLUTION INTRAVENOUS at 08:06

## 2024-06-12 RX ADMIN — CETIRIZINE HYDROCHLORIDE 10 MG: 10 TABLET, FILM COATED ORAL at 10:06

## 2024-06-12 RX ADMIN — INSULIN HUMAN 6 UNITS: 100 INJECTION, SOLUTION PARENTERAL at 09:06

## 2024-06-12 RX ADMIN — METHYLPREDNISOLONE SODIUM SUCCINATE 125 MG: 125 INJECTION, POWDER, FOR SOLUTION INTRAMUSCULAR; INTRAVENOUS at 08:06

## 2024-06-12 NOTE — Clinical Note
"Jarrod "Jarroddaniela Mercado was seen and treated in our emergency department on 6/12/2024.  He may return to work on 06/14/2024.       If you have any questions or concerns, please don't hesitate to call.      Dianne Estevez, DO"

## 2024-06-12 NOTE — ED PROVIDER NOTES
Encounter Date: 6/12/2024    SCRIBE #1 NOTE: I, Aimee Colby, am scribing for, and in the presence of,  Dianne Estevez DO. I have scribed the following portions of the note - Other sections scribed: HPI,ROS,PE,MDM.       History     Chief Complaint   Patient presents with    Allergic Reaction     Pt stung by bee yesterday. Swelling to face noted.      Jarrod Mercado is a 53 y.o. male with Hx of type II DM, HTN, HLD, who presents to the ED for chief complaint of worsening facial swelling with associated SOB post bee sting that began yesterday afternoon. Patient reports he was cutting grass when a bee stung him on his lip, neck  and forehead yesterday AM. Patient states he did not notice the swelling until yesterday afternoon. Attempted treatment with ice and benadryl( 2x 25mg)and the swelling resolved. Patient further states when he woke up this AM symptoms much worse.  Patient reports  his face was swelling, lip swelling  prompting ED visit. Patient states he did not take his HTN or DM medication today. Denies having Epipen at home. Denies trouble swallowing, wheezing, or other associated symptoms. Patient notes known allergy to bee stings.       The history is provided by the patient. No  was used.     Review of patient's allergies indicates:  No Known Allergies  Past Medical History:   Diagnosis Date    Diabetes mellitus     Diabetes mellitus type I     Hyperlipidemia associated with type 2 diabetes mellitus 9/27/2023    Hypertension      Past Surgical History:   Procedure Laterality Date    COLONOSCOPY  01/28/2022    COLONOSCOPY N/A 01/28/2022    Procedure: COLONOSCOPY;  Surgeon: Don Mon MD;  Location: [facility] ENDO;  Service: Endoscopy;  Laterality: N/A;    INCISION AND DRAINAGE OF HAND Right 3/4/2024    Procedure: INCISION AND DRAINAGE, HAND;  Surgeon: Wilfrido Prado MD;  Location: [facility] OR;  Service: Orthopedics;  Laterality: Right;  possible I&D depending on how incision looks  morning of, cultures    INCISION AND DRAINAGE OF WOUND  03/04/2024    Middle finger and thumb    OPEN REDUCTION AND INTERNAL FIXATION (ORIF) OF INJURY OF FINGER  03/04/2024    Middle finger    OPEN REDUCTION AND INTERNAL FIXATION (ORIF) OF INJURY OF FINGER Right 3/4/2024    Procedure: ORIF, FINGER;  Surgeon: Wilfrido Prado MD;  [facility];  Service: Orthopedics;  Laterality: Right;  right middle finger, c-arm, accumed    right knee       Family History   Problem Relation Name Age of Onset    Kidney disease Mother      Hypertension Father      Diabetes type II Father       Social History     Tobacco Use    Smoking status: Never    Smokeless tobacco: Never   Substance Use Topics    Alcohol use: Yes     Comment: occasionally    Drug use: No     Review of Systems   Constitutional:  Negative for fever.   HENT:  Positive for facial swelling (lips,forehead, neck). Negative for rhinorrhea, sore throat and trouble swallowing.    Respiratory:  Positive for shortness of breath. Negative for wheezing.    Cardiovascular:  Negative for leg swelling.   Skin:  Negative for rash.   Neurological:  Negative for numbness.   All other systems reviewed and are negative.      Patient gave consent to have physical exam performed.    Physical Exam     Initial Vitals [06/12/24 0726]   BP Pulse Resp Temp SpO2   (!) 159/113 79 18 98 °F (36.7 °C) 98 %      MAP       --         Physical Exam    Nursing note and vitals reviewed.  Constitutional: He appears well-developed and well-nourished.   HENT:   Head: Normocephalic and atraumatic.   Right Ear: External ear normal.   Left Ear: External ear normal.   Nose: Nose normal.   Mouth/Throat: Oropharynx is clear and moist.   Eyes: Conjunctivae and EOM are normal. Pupils are equal, round, and reactive to light.   Neck: Neck supple.   Normal range of motion.  Cardiovascular:  Normal rate, regular rhythm and normal heart sounds.     Exam reveals no gallop and no friction rub.       No murmur  heard.  Pulmonary/Chest: Breath sounds normal. No respiratory distress. He has no wheezes. He has no rhonchi. He has no rales.   Abdominal: Abdomen is soft. Bowel sounds are normal. There is no abdominal tenderness. There is no rebound and no guarding.   Musculoskeletal:         General: No tenderness or edema. Normal range of motion.      Cervical back: Normal range of motion and neck supple.     Neurological: He is alert and oriented to person, place, and time. No cranial nerve deficit.   Skin: Skin is warm and dry. Capillary refill takes less than 2 seconds. No rash noted.   There is angioedema of the face BL   Psychiatric: He has a normal mood and affect. His behavior is normal.         ED Course   Critical Care    Date/Time: 6/12/2024 8:33 AM    Performed by: Dianne Estevez DO  Authorized by: Dianne Estevez DO  Direct patient critical care time: 10 minutes  Additional history critical care time: 10 minutes  Ordering / reviewing critical care time: 10 minutes  Documentation critical care time: 10 minutes  Total critical care time (exclusive of procedural time) : 40 minutes  Critical care was necessary to treat or prevent imminent or life-threatening deterioration of the following conditions: circulatory failure.  Critical care was time spent personally by me on the following activities: evaluation of patient's response to treatment, examination of patient, obtaining history from patient or surrogate, ordering and performing treatments and interventions, ordering and review of laboratory studies, ordering and review of radiographic studies, pulse oximetry and re-evaluation of patient's condition.        Labs Reviewed   POCT URINALYSIS W/O SCOPE - Abnormal; Notable for the following components:       Result Value    Blood, UA Trace-lysed (*)     Protein, UA Trace (*)     All other components within normal limits   POCT CMP - Abnormal; Notable for the following components:    POC Creatinine 1.4 (*)     POC Glucose  336 (*)     All other components within normal limits   ISTAT PROCEDURE - Abnormal; Notable for the following components:    POC PH 7.346 (*)     POC PCO2 46.7 (*)     All other components within normal limits   POCT GLUCOSE - Abnormal; Notable for the following components:    POCT Glucose 321 (*)     All other components within normal limits   POCT GLUCOSE - Abnormal; Notable for the following components:    POCT Glucose 306 (*)     All other components within normal limits   POCT GLUCOSE - Abnormal; Notable for the following components:    POCT Glucose 292 (*)     All other components within normal limits   TROPONIN ISTAT   POCT CBC   POCT URINALYSIS W/O SCOPE   POCT CMP   POCT TROPONIN        ECG Results              EKG 12-lead (Final result)        Collection Time Result Time QRS Duration OHS QTC Calculation    06/12/24 08:24:16 06/12/24 19:13:27 92 430                     Final result by Interface, Lab In [facility] (06/12/24 19:13:36)                   Narrative:    Test Reason : sob    Vent. Rate : 087 BPM     Atrial Rate : 087 BPM     P-R Int : 142 ms          QRS Dur : 092 ms      QT Int : 358 ms       P-R-T Axes : 081 022 002 degrees     QTc Int : 430 ms    Normal sinus rhythm with sinus arrhythmia  Minimal voltage criteria for LVH, may be normal variant ( Sokolow-Hong )  Nonspecific ST and T wave abnormality  Abnormal ECG  When compared with ECG of 23-FEB-2024 16:36,  No significant change was found  Confirmed by Osvaldo Mcdonald MD ([id number]) on 6/12/2024 7:13:26 PM    Referred By: [email or web address]   SELF           Confirmed By:Osvaldo Mcdonald MD                                  Imaging Results              X-Ray Chest AP Portable (Final result)  Result time 06/12/24 10:17:56      Final result by Anne Canela MD (06/12/24 10:17:56)                   Impression:      No acute abnormality.      Electronically signed by: Anne Canela MD  Date:    06/12/2024  Time:    10:17               Narrative:     EXAMINATION:  XR CHEST AP PORTABLE    CLINICAL HISTORY:  Essential (primary) hypertension    TECHNIQUE:  Single frontal view of the chest was performed.    COMPARISON:  02/23/2024    FINDINGS:  The lungs are clear, with normal appearance of pulmonary vasculature and no pleural effusion or pneumothorax.    The cardiac silhouette is normal in size. The hilar and mediastinal contours are unremarkable.    Bones are intact.                                       Medications   EPINEPHrine (EPIPEN) 0.3 mg/0.3 mL pen injection 0.3 mg (0.3 mg Intramuscular Given 6/12/24 0811)   diphenhydrAMINE injection 25 mg (25 mg Intravenous Given 6/12/24 0812)   famotidine (PF) injection 40 mg (40 mg Intravenous Given 6/12/24 0813)   methylPREDNISolone sodium succinate injection 125 mg (125 mg Intravenous Given 6/12/24 0813)   sodium chloride 0.9% bolus 1,000 mL 1,000 mL (0 mLs Intravenous Stopped 6/12/24 0936)   sodium chloride 0.9% bolus 1,000 mL 1,000 mL (0 mLs Intravenous Stopped 6/12/24 0936)   insulin regular injection 6 Units 0.06 mL (6 Units Subcutaneous Given 6/12/24 0938)   losartan tablet 50 mg (50 mg Oral Given 6/12/24 0929)   cetirizine tablet 10 mg (10 mg Oral Given 6/12/24 1019)     Medical Decision Making  Amount and/or Complexity of Data Reviewed  Labs: ordered. Decision-making details documented in ED Course.  Radiology: ordered. Decision-making details documented in ED Course.  ECG/medicine tests: ordered and independent interpretation performed. Decision-making details documented in ED Course.     Details: EKG independently interpreted by Dr. Estevez reads: see ED course.       Risk  OTC drugs.  Prescription drug management.    Medical Decision Making:    This is an evaluation of a 53 y.o. male that presents to the Emergency Department for   Chief Complaint   Patient presents with    Allergic Reaction     Pt stung by bee yesterday. Swelling to face noted.        Independent historian: (parent/ EMS/ spouse/ etc)  none    The patient is a non-toxic and well appearing patient. On physical exam, patient appears well hydrated with moist mucus membranes. Breath sounds are clear and equal bilaterally with no adventitious breath sounds, tachypnea or respiratory distress. Regular rate and rhythm. No murmurs. Abdomen soft and non tender. Patient is tolerating PO without difficulty. Physical exam otherwise as above.     I have reviewed vital signs and nursing notes.   Vital Signs Are Reassuring.     Based on the patient's symptoms, I am considering and evaluating for the following differential diagnoses: allergic reaction, angioedema, hyperglycemia, DKA, anaphylactic shock    Consider hospitalization for:  Angioedema    Patient is agreeable to transfer and admission to [facility] if necessary    ED Course:Treatment in the ED included Physical Exam and medications given in ED  Medications   EPINEPHrine (EPIPEN) 0.3 mg/0.3 mL pen injection 0.3 mg (0.3 mg Intramuscular Given 6/12/24 0811)   diphenhydrAMINE injection 25 mg (25 mg Intravenous Given 6/12/24 0812)   famotidine (PF) injection 40 mg (40 mg Intravenous Given 6/12/24 0813)   methylPREDNISolone sodium succinate injection 125 mg (125 mg Intravenous Given 6/12/24 0813)   sodium chloride 0.9% bolus 1,000 mL 1,000 mL (0 mLs Intravenous Stopped 6/12/24 0936)   sodium chloride 0.9% bolus 1,000 mL 1,000 mL (0 mLs Intravenous Stopped 6/12/24 0936)   insulin regular injection 6 Units 0.06 mL (6 Units Subcutaneous Given 6/12/24 0938)   losartan tablet 50 mg (50 mg Oral Given 6/12/24 0929)   cetirizine tablet 10 mg (10 mg Oral Given 6/12/24 1019)   .   Patient reports feeling better after treatment in the ER.       External Data/Documents Reviewed: Previous medical records and vital signs reviewed, see HPI and Physical exam.   Labs: ordered and reviewed.  Glucose elevated at 336.  Radiology: ordered as indicated and reviewed.  No pneumothorax  ECG/medicine tests: ordered and  independent interpretation performed by Dr. Dianne Estevez DO. Decision-making details documented in ED Course.   Cardiac monitor placed for angioedema. Monitor shows Normal Sinus Rhythm with  rate of 72. Interpreted by Dr. Dianne Estevze DO.    Risk  Diagnosis or treatment significantly limited by the following social determinants of health: Body mass index is 29.14 kg/m².     In shared decision making with the patient, we discussed treatment, prescriptions, labs, and imaging results.    Discharge home with   ED Prescriptions       Medication Sig Dispense Start Date End Date Auth. Provider    EPINEPHrine (EPIPEN 2-RAMONE) 0.3 mg/0.3 mL AtIn () Inject 0.3 mLs (0.3 mg total) into the muscle once. for 1 dose 0.3 mL 2024 Dianne Estevez DO    diphenhydrAMINE (BENADRYL) 25 mg capsule Take 1 capsule (25 mg total) by mouth every 6 (six) hours as needed for Itching or Allergies. 20 capsule 2024 -- Dianne Estevez DO    famotidine (PEPCID) 20 MG tablet Take 1 tablet (20 mg total) by mouth 2 (two) times daily. 20 tablet 2024 Dianne Estevez DO    predniSONE (DELTASONE) 20 MG tablet Take 2 tablets (40 mg total) by mouth once daily. for 5 days 10 tablet 2024 Dianne Estevez DO    cetirizine (ZYRTEC) 10 MG tablet Take 1 tablet (10 mg total) by mouth once daily. 30 tablet 2024 Dianne Estevez DO          Fill and take prescriptions as directed.  Return to the ED if symptoms worsen or do not resolve.   Answered questions and discussed discharge plan.    Patient reports significant relief of swelling and is ready for discharge.  Follow up with PCP/specialist in 1 day      At time of discharge patient is awake alert oriented x4 speaking clearly in full sentences and moving all 4 extremities.     The following labs and imaging were reviewed:        Admission on 2024, Discharged on 2024   Component Date Value Ref Range Status    QRS Duration 2024 92  ms Final     OHS QTC Calculation 06/12/2024 430  ms Final    Albumin, POC 06/12/2024 4.2  3.3 - 5.5 g/dL Final    Alkaline Phosphatase, POC 06/12/2024 88  42 - 141 U/L Final    ALT (SGPT), POC 06/12/2024 17  10 - 47 U/L Final    AST (SGOT), POC 06/12/2024 19  11 - 38 U/L Final    POC BUN 06/12/2024 16  7 - 22 mg/dL Final    Calcium, POC 06/12/2024 10.2  8.0 - 10.3 mg/dL Final    POC Chloride 06/12/2024 103  98 - 108 mmol/L Final    POC Creatinine 06/12/2024 1.4 (H)  0.6 - 1.2 mg/dL Final    POC Glucose 06/12/2024 336 (H)  73 - 118 mg/dL Final    POC Potassium 06/12/2024 4.4  3.6 - 5.1 mmol/L Final    POC Sodium 06/12/2024 144  128 - 145 mmol/L Final    Bilirubin, POC 06/12/2024 0.6  0.2 - 1.6 mg/dL Final    POC TCO2 06/12/2024 28  18 - 33 mmol/L Final    Protein, POC 06/12/2024 7.8  6.4 - 8.1 g/dL Final    POC Cardiac Troponin I 06/12/2024 0.00  0.00 - 0.08 ng/mL Final    Sample 06/12/2024 unknown   Final    Comment: A single negative troponin is insufficient to rule out myocardial infarction.  The use of a serial sampling protocol is recommended practice. Correlate results with reference intervals established for methodology used. Point of care and core laboratory   troponin results are not interchangeable.      POC PH 06/12/2024 7.346 (L)  7.35 - 7.45 Final    POC PCO2 06/12/2024 46.7 (H)  35 - 45 mmHg Final    POC PO2 06/12/2024 56  40 - 60 mmHg Final    POC HCO3 06/12/2024 25.5  24 - 28 mmol/L Final    POC BE 06/12/2024 -1  -2 to 2 mmol/L Final    POC SATURATED O2 06/12/2024 87  95 - 100 % Final    POC Lactate 06/12/2024 1.26  0.5 - 2.2 mmol/L Final    POC TCO2 06/12/2024 27  24 - 29 mmol/L Final    Sample 06/12/2024 VENOUS   Final    Site 06/12/2024 Other   Final    Allens Test 06/12/2024 N/A   Final    DelSys 06/12/2024 Room Air   Final    Mode 06/12/2024 SPONT   Final    POCT Glucose 06/12/2024 321 (H)  70 - 110 mg/dL Final    POCT Glucose 06/12/2024 306 (H)  70 - 110 mg/dL Final    Glucose, UA 06/12/2024 Negative   Final     Bilirubin, UA 06/12/2024 Negative   Final    Ketones, UA 06/12/2024 Negative   Final    Spec Grav UA 06/12/2024 1.020   Final    Blood, UA 06/12/2024 Trace-lysed (A)   Final    PH, UA 06/12/2024 5.5   Final    Protein, UA 06/12/2024 Trace (A)   Final    Urobilinogen, UA 06/12/2024 0.2  E.U./dL Final    Nitrite, UA 06/12/2024 Negative   Final    Leukocytes, UA 06/12/2024 Negative   Final    Color, UA 06/12/2024 Yellow   Final    Clarity, UA 06/12/2024 Clear   Final    POCT Glucose 06/12/2024 292 (H)  70 - 110 mg/dL Final        Imaging Results              X-Ray Chest AP Portable (Final result)  Result time 06/12/24 10:17:56      Final result by Anne Canela MD (06/12/24 10:17:56)                   Impression:      No acute abnormality.      Electronically signed by: Anne Canela MD  Date:    06/12/2024  Time:    10:17               Narrative:    EXAMINATION:  XR CHEST AP PORTABLE    CLINICAL HISTORY:  Essential (primary) hypertension    TECHNIQUE:  Single frontal view of the chest was performed.    COMPARISON:  02/23/2024    FINDINGS:  The lungs are clear, with normal appearance of pulmonary vasculature and no pleural effusion or pneumothorax.    The cardiac silhouette is normal in size. The hilar and mediastinal contours are unremarkable.    Bones are intact.                                            Scribe Attestation:   Scribe #1: I performed the above scribed service and the documentation accurately describes the services I performed. I attest to the accuracy of the note.        ED Course as of 06/13/24 0839   Wed Jun 12, 2024   0856 EKG independently interpreted by Dr. Estevez reads: No STEMI. Rate of 87. Normal Sinus Rhythm. Normal Axis. Abnormal EKG. QTc normal at 430. When compared to prior EKG dated 02/23/24 rate increased by 10 bpm.   .    [JL]      ED Course User Index  [JL] Aimee Gavin, Dr. Dianne Estevez, personally performed the services described in  this documentation. This document was produced by a scribe under my direction and in my presence. All medical record entries made by the scribe were at my direction and in my presence.  I have reviewed the chart and agree that the record reflects my personal performance and is accurate and complete. Dianne Estevez DO.     2024 8:39 AM    Clinical Impression:  Final diagnoses:  [I10] HTN (hypertension)  [T78.3XXA] Angioedema, initial encounter (Primary)  [T78.40XA] Allergic reaction, initial encounter  [R73.9] Hyperglycemia          ED Disposition Condition    Discharge Stable          ED Prescriptions       Medication Sig Dispense Start Date End Date Auth. Provider    EPINEPHrine (EPIPEN 2-RAMONE) 0.3 mg/0.3 mL AtIn () Inject 0.3 mLs (0.3 mg total) into the muscle once. for 1 dose 0.3 mL 2024 Dianne Estevez DO    diphenhydrAMINE (BENADRYL) 25 mg capsule Take 1 capsule (25 mg total) by mouth every 6 (six) hours as needed for Itching or Allergies. 20 capsule 2024 -- Dianne Estevez DO    famotidine (PEPCID) 20 MG tablet Take 1 tablet (20 mg total) by mouth 2 (two) times daily. 20 tablet 2024 Dianne Estevez DO    predniSONE (DELTASONE) 20 MG tablet Take 2 tablets (40 mg total) by mouth once daily. for 5 days 10 tablet 2024 Dianne Estevez DO    cetirizine (ZYRTEC) 10 MG tablet Take 1 tablet (10 mg total) by mouth once daily. 30 tablet 2024 Dianne Estevez DO          Follow-up Information       Follow up With Specialties Details Why Contact Info    Bonny Jones MD Internal Medicine, Wound Care Schedule an appointment as soon as possible for a visit in 1 day  [address] [place] [address]  [address]  [place] [id number]  [phone]      [facility] Emergency Dept Emergency Medicine Go to  Please go to [facility] emergency department if symptoms worsen [address] [facility] [place]  [address]  [phone]             Dianne Estevez,   06/13/24 0839

## 2024-06-19 ENCOUNTER — HOSPITAL ENCOUNTER (EMERGENCY)
Facility: HOSPITAL | Age: 54
Discharge: HOME | End: 2024-06-20
Attending: STUDENT IN AN ORGANIZED HEALTH CARE EDUCATION/TRAINING PROGRAM
Payer: MEDICAID

## 2024-06-19 DIAGNOSIS — R73.09 ELEVATED GLUCOSE: ICD-10-CM

## 2024-06-19 DIAGNOSIS — R07.9 CHEST PAIN: ICD-10-CM

## 2024-06-19 DIAGNOSIS — E10.65 UNCONTROLLED TYPE 1 DIABETES MELLITUS WITH HYPERGLYCEMIA: Primary | ICD-10-CM

## 2024-06-19 LAB
ALBUMIN SERPL-MCNC: 3.7 G/DL (ref 3.3–5.5)
ALLENS TEST: ABNORMAL
ALP SERPL-CCNC: 94 U/L (ref 42–141)
BILIRUB SERPL-MCNC: 0.5 MG/DL (ref 0.2–1.6)
BILIRUBIN, POC UA: NEGATIVE
BLOOD, POC UA: ABNORMAL
BUN SERPL-MCNC: 25 MG/DL (ref 7–22)
CALCIUM SERPL-MCNC: 9.9 MG/DL (ref 8–10.3)
CHLORIDE SERPL-SCNC: 99 MMOL/L (ref 98–108)
CLARITY, POC UA: CLEAR
COLOR, POC UA: YELLOW
CREAT SERPL-MCNC: 1.4 MG/DL (ref 0.6–1.2)
DELSYS: ABNORMAL
GLUCOSE SERPL-MCNC: 373 MG/DL (ref 73–118)
GLUCOSE, POC UA: ABNORMAL
HCO3 UR-SCNC: 28.2 MMOL/L (ref 24–28)
HCT, POC: NORMAL
HGB, POC: NORMAL (ref 14–18)
KETONES, POC UA: NEGATIVE
LDH SERPL L TO P-CCNC: 1.02 MMOL/L (ref 0.5–2.2)
LEUKOCYTE EST, POC UA: NEGATIVE
MCH, POC: NORMAL
MCHC, POC: NORMAL
MCV, POC: NORMAL
MPV, POC: NORMAL
NITRITE, POC UA: NEGATIVE
PCO2 BLDA: 44.6 MMHG (ref 35–45)
PH SMN: 7.41 [PH] (ref 7.35–7.45)
PH UR STRIP: 6.5 [PH]
PO2 BLDA: 59 MMHG (ref 40–60)
POC ALT (SGPT): 29 U/L (ref 10–47)
POC AST (SGOT): 21 U/L (ref 11–38)
POC B-TYPE NATRIURETIC PEPTIDE: 8.2 PG/ML (ref 0–100)
POC BE: 3 MMOL/L
POC CARDIAC TROPONIN I: 0.01 NG/ML (ref 0–0.08)
POC PLATELET COUNT: NORMAL
POC PTINR: 1.2 (ref 0.9–1.2)
POC PTWBT: 14.8 SEC (ref 9.7–14.3)
POC SATURATED O2: 90 % (ref 95–100)
POC TCO2: 28 MMOL/L (ref 18–33)
POC TCO2: 30 MMOL/L (ref 24–29)
POCT GLUCOSE: 306 MG/DL (ref 70–110)
POCT GLUCOSE: 397 MG/DL (ref 70–110)
POTASSIUM BLD-SCNC: 4.3 MMOL/L (ref 3.6–5.1)
PROTEIN, POC UA: NEGATIVE
PROTEIN, POC: 6.8 G/DL (ref 6.4–8.1)
RBC, POC: NORMAL
RDW, POC: NORMAL
SAMPLE: ABNORMAL
SAMPLE: ABNORMAL
SAMPLE: NORMAL
SITE: ABNORMAL
SODIUM BLD-SCNC: 137 MMOL/L (ref 128–145)
SPECIFIC GRAVITY, POC UA: 1.02
UROBILINOGEN, POC UA: 0.2 E.U./DL
WBC, POC: NORMAL

## 2024-06-19 PROCEDURE — 80053 COMPREHEN METABOLIC PANEL: CPT | Mod: ER

## 2024-06-19 PROCEDURE — 83880 ASSAY OF NATRIURETIC PEPTIDE: CPT | Mod: ER

## 2024-06-19 PROCEDURE — 82803 BLOOD GASES ANY COMBINATION: CPT | Mod: ER

## 2024-06-19 PROCEDURE — 96361 HYDRATE IV INFUSION ADD-ON: CPT | Mod: ER

## 2024-06-19 PROCEDURE — 93010 ELECTROCARDIOGRAM REPORT: CPT | Mod: ,,, | Performed by: INTERNAL MEDICINE

## 2024-06-19 PROCEDURE — 99284 EMERGENCY DEPT VISIT MOD MDM: CPT | Mod: 25,ER

## 2024-06-19 PROCEDURE — 85025 COMPLETE CBC W/AUTO DIFF WBC: CPT | Mod: ER

## 2024-06-19 PROCEDURE — 84484 ASSAY OF TROPONIN QUANT: CPT | Mod: ER

## 2024-06-19 PROCEDURE — 93005 ELECTROCARDIOGRAM TRACING: CPT | Mod: ER

## 2024-06-19 PROCEDURE — 99900035 HC TECH TIME PER 15 MIN (STAT): Mod: ER

## 2024-06-19 PROCEDURE — 82962 GLUCOSE BLOOD TEST: CPT | Mod: ER

## 2024-06-19 PROCEDURE — 25000003 PHARM REV CODE 250: Mod: ER

## 2024-06-19 PROCEDURE — 83605 ASSAY OF LACTIC ACID: CPT | Mod: ER

## 2024-06-19 RX ORDER — ACETAMINOPHEN 500 MG
500 TABLET ORAL EVERY 6 HOURS PRN
Qty: 30 TABLET | Refills: 0 | Status: SHIPPED | OUTPATIENT
Start: 2024-06-19

## 2024-06-19 RX ORDER — ACETAMINOPHEN 500 MG
1000 TABLET ORAL
Status: COMPLETED | OUTPATIENT
Start: 2024-06-19 | End: 2024-06-19

## 2024-06-19 RX ORDER — METHOCARBAMOL 500 MG/1
1000 TABLET, FILM COATED ORAL 3 TIMES DAILY PRN
Qty: 30 TABLET | Refills: 0 | Status: SHIPPED | OUTPATIENT
Start: 2024-06-19

## 2024-06-19 RX ADMIN — SODIUM CHLORIDE 1000 ML: 9 INJECTION, SOLUTION INTRAVENOUS at 11:06

## 2024-06-19 RX ADMIN — SODIUM CHLORIDE 1000 ML: 9 INJECTION, SOLUTION INTRAVENOUS at 10:06

## 2024-06-19 RX ADMIN — ACETAMINOPHEN 1000 MG: 500 TABLET ORAL at 09:06

## 2024-06-20 VITALS
DIASTOLIC BLOOD PRESSURE: 89 MMHG | HEIGHT: 73 IN | BODY MASS INDEX: 29.16 KG/M2 | WEIGHT: 220 LBS | TEMPERATURE: 99 F | SYSTOLIC BLOOD PRESSURE: 159 MMHG | HEART RATE: 52 BPM | RESPIRATION RATE: 26 BRPM | OXYGEN SATURATION: 97 %

## 2024-06-20 LAB
OHS QRS DURATION: 80 MS
OHS QTC CALCULATION: 368 MS
POCT GLUCOSE: 214 MG/DL (ref 70–110)
POCT GLUCOSE: 299 MG/DL (ref 70–110)

## 2024-06-20 PROCEDURE — 96374 THER/PROPH/DIAG INJ IV PUSH: CPT | Mod: ER

## 2024-06-20 PROCEDURE — 63600175 PHARM REV CODE 636 W HCPCS: Mod: ER | Performed by: STUDENT IN AN ORGANIZED HEALTH CARE EDUCATION/TRAINING PROGRAM

## 2024-06-20 PROCEDURE — 82962 GLUCOSE BLOOD TEST: CPT | Mod: ER

## 2024-06-20 RX ADMIN — INSULIN HUMAN 6 UNITS: 100 INJECTION, SOLUTION PARENTERAL at 12:06

## 2024-06-20 NOTE — DISCHARGE INSTRUCTIONS

## 2024-06-26 ENCOUNTER — OFFICE VISIT (OUTPATIENT)
Dept: ORTHOPEDICS | Facility: CLINIC | Age: 54
End: 2024-06-26
Payer: MEDICAID

## 2024-06-26 ENCOUNTER — CLINICAL SUPPORT (OUTPATIENT)
Dept: DIABETES | Facility: CLINIC | Age: 54
End: 2024-06-26
Payer: MEDICAID

## 2024-06-26 VITALS
WEIGHT: 214.38 LBS | SYSTOLIC BLOOD PRESSURE: 128 MMHG | HEART RATE: 73 BPM | BODY MASS INDEX: 28.29 KG/M2 | DIASTOLIC BLOOD PRESSURE: 84 MMHG

## 2024-06-26 DIAGNOSIS — E08.00 DIABETES MELLITUS DUE TO UNDERLYING CONDITION WITH HYPEROSMOLARITY WITHOUT COMA, WITHOUT LONG-TERM CURRENT USE OF INSULIN: ICD-10-CM

## 2024-06-26 DIAGNOSIS — S62.629A: Primary | ICD-10-CM

## 2024-06-26 PROCEDURE — 99999 PR PBB SHADOW E&M-EST. PATIENT-LVL II: CPT | Mod: PBBFAC,,,

## 2024-06-26 PROCEDURE — 99999 PR PBB SHADOW E&M-EST. PATIENT-LVL IV: CPT | Mod: PBBFAC,,,

## 2024-06-26 PROCEDURE — 99999PBSHW PR PBB SHADOW TECHNICAL ONLY FILED TO HB: Mod: PBBFAC,,,

## 2024-06-26 PROCEDURE — 99212 OFFICE O/P EST SF 10 MIN: CPT | Mod: PBBFAC,27

## 2024-06-26 PROCEDURE — G0108 DIAB MANAGE TRN  PER INDIV: HCPCS | Mod: PBBFAC

## 2024-06-26 PROCEDURE — 99214 OFFICE O/P EST MOD 30 MIN: CPT | Mod: PBBFAC,PN

## 2024-06-26 NOTE — PROGRESS NOTES
Post-op Note    HPI    Jarrod Mercado is here 12 weeks s/p the following procedure:     3/4/2024  Open reduction internal fixation right long finger middle phalanx  Extensor tendon repair right long finger  Removal of nail right thumb with repair of the nail bed  Irrigation debridement right thumb and long finger  Closed treatment right index finger middle phalanx    Overall doing well.  Has returned to normal activity.  Able to fully make a fist when he puts his hand in warm water.  He was back to lawn mowing.  Overall doing very well.    Physical Exam:     Patient is alert and oriented no acute distress.   Assistive Device:  None    Right hand:  Incision well healed and no sign of dehiscence to the long finger.  Supple motion of the PIP joint although restricted due to joint contracture.  Third digital nerve intact to light touch.  Able to make composite fist.    Imaging:     I have personally reviewed the following imaging and these are an interpretation of my findings:     X-ray right hand reviewed by me which shows healing fracture of long finger right middle phalanx, a screw in good alignment without evidence of failure or complication    Assessment    Jarrod Mercado is 12 weeks Post-op     Plan:    Overall doing very well.  We discussed expectations of surgery and postoperative course.     Pain: Continued postoperative pain regimen- tylenol prn   PT/OT: Continue/Initiate physical therapy:  Discussed importance of continuation of home exercise program.  No restrictions.    Regarding his diabetes, we discussed his A1c is very elevated, I do believe he was poor understanding of his prognosis.  He was agreeable to diabetes education referral, order placed.    Follow up:  As needed  X-rays next visit:  None

## 2024-06-27 VITALS — HEIGHT: 73 IN | BODY MASS INDEX: 28.41 KG/M2 | WEIGHT: 214.38 LBS

## 2024-06-27 NOTE — PROGRESS NOTES
"Diabetes Care Specialist Progress Note  Author: Tavia Diaz RN  Date: 6/26/2024    Program Intake  Reason for Diabetes Program Visit:: Intervention  Type of Intervention:: Individual  Education: Self-Management Skill Review, Nutrition and Meal Planning  Current diabetes risk level:: high  In the last 12 months, have you:: used emergency room services  Was the ER or hospital admission related to diabetes?: Yes  Permission to speak with others about care:: yes  Continuous Glucose Monitoring  Patient has CGM: No    Lab Results   Component Value Date    HGBA1C 9.5 (H) 02/28/2024       Clinical  Weight: 97.3 kg (214 lb 6.4 oz)   Height: 6' 1" (185.4 cm)   Body mass index is 28.29 kg/m².    Clinical Assessment  Current Diabetes Treatment: Oral Medication (Glimepiride 4mg w/meals and Tradjenta 5mg daily is on pt's medication list; pt says he doesn't take it.)  Have you ever experienced hypoglycemia (low blood sugar)?: no  Have you ever experienced hyperglycemia (high blood sugar)?: no    Medication Information  How do you obtain your medications?: Patient drives  How many days a week do you miss your medications?:  (Pt says he doesn't take Tradjenta)  Do you sometimes have difficulty refilling your medications?: No  Medication adherence impacting ability to self-manage diabetes?: No    Labs  Do you have regular lab work to monitor your medications?: Yes  Type of Regular Lab Work: A1c  Where do you get your labs drawn?: [facility]  Lab Compliance Barriers: No    Nutritional Status  Diet: Regular  Meal Plan 24 Hour Recall - Breakfast: fast food  Meal Plan 24 Hour Recall - Lunch: fast food  Meal Plan 24 Hour Recall - Dinner: jambalaya, corn, and pork   Change in appetite?: No  Recent Changes in Weight: No Recent Weight Change  Current nutritional status an area of need that is impacting patient's ability to self-manage diabetes?: Yes    Additional Social History  Support  Does anyone support you with your diabetes care?: " no  Who supports you?: self  Who takes you to your medical appointments?: domestic partner  Does the current support meet the patient's needs?: Yes  Is Support an area impacting ability to self-manage diabetes?: No    Access to Mass Media & Technology  Does the patient have access to any of the following devices or technologies?: Smart phone  Media or technology needs impacting ability to self-manage diabetes?: No    Cognitive/Behavioral Health  Alert and Oriented: Yes  Difficulty Thinking: No  Requires Prompting: No  Requires assistance for routine expression?: No  Cognitive or behavioral barriers impacting ability to self-manage diabetes?: No    Culture/Nondenominational  Culture or Hindu beliefs that may impact ability to access healthcare: No    Communication  Language preference: English  Hearing Problems: No  Communication needs impacting ability to self-manage diabetes?: No    Health Literacy  Preferred Learning Method: Face to Face, Reading Materials  How often do you need to have someone help you read instructions, pamphlets, or written material from your doctor or pharmacy?: Never  Health literacy needs impacting ability to self-manage diabetes?: No      Diabetes Self-Management Skills Assessment  Diabetes Disease Process/Treatment Options  Patient/caregiver able to state what happens when someone has diabetes.: somewhat  Patient/caregiver knows what type of diabetes they have.: yes  Diabetes Type : Type I  Patient/caregiver able to identify at least three signs and symptoms of diabetes.: no  Patient able to identify at least three risk factors for diabetes.: no  Diabetes Disease Process/Treatment Options: Skills Assessment Completed: Yes  Assessment indicates:: Instruction Needed, Knowledge deficit  Area of need?: Yes    Nutrition/Healthy Eating  Challenges to healthy eating:: portion control  Method of carbohydrate measurement:: no method  Patient can identify foods that impact blood sugar.:  yes  Patient-identified foods:: soda, starches (bread, pasta, rice, cereal), sweets  Nutrition/Healthy Eating Skills Assessment Completed:: Yes  Assessment indicates:: Instruction Needed, Knowledge deficit  Area of need?: Yes    Physical Activity/Exercise  Physical Activity/Exercise Skills Assessment Completed: : No  Deffered due to:: Time    Medications  Patient is able to describe current diabetes management routine.: yes  Diabetes management routine:: diet, oral medications  Patient is able to identify current diabetes medications, dosages, and appropriate timing of medications.: yes  Patient understands the purpose of the medications taken for diabetes.: no  Patient reports problems or concerns with current medication regimen.: no  Medication Skills Assessment Completed:: Yes  Assessment indicates:: Instruction Needed, Knowledge deficit  Area of need?: Yes    Home Blood Glucose Monitoring  Patient states that blood sugar is checked at home daily.: no  Monitoring Method:: home glucometer  Reasons for not monitoring:: other (see comments) (Pt didn't voice a particular reason.)  Blood glucose logs:: encouraged to keep logs, encouraged to bring logs to provider visits  Blood glucose logs reviewed today?: no  Home Blood Glucose Monitoring Skills Assessment Completed: : Yes  Assessment indicates:: Instruction Needed, Knowledge deficit  Area of need?: Yes    Acute Complications  Acute Complications Skills Assessment Completed: : No  Deffered due to:: Time    Chronic Complications  Chronic Complications Skills Assessment Completed: : No  Deferred due to:: Time    Psychosocial/Coping  Psychosocial/Coping Skills Assessment Completed: : No  Deffered due to:: Time      Assessment Summary and Plan    Based on today's diabetes care assessment, the following areas of need were identified:          6/26/2024    12:05 AM   Social   Support No   Access to Mass Media/Tech No   Cognitive/Behavioral Health No   Culture/Synagogue No    Communication No   Health Literacy No            6/26/2024    12:05 AM   Clinical   Medication Adherence No   Lab Compliance No   Nutritional Status Yes            6/26/2024    12:05 AM   Diabetes Self-Management Skills   Diabetes Disease Process/Treatment Options Yes- - Provided DM management guide and discussed risk factors of diabetes. Also, instructed patient on what is diabetes and the progression of the disease. Discussed significance of current A1c.   Nutrition/Healthy Eating Yes- see care planning   Medication Yes- see care planning   Home Blood Glucose Monitoring Yes- Discussed BS goals and importance of monitoring and recording BS for review and maintenance of medication. Patient encouraged to document glucose results and bring them to every clinic visit.      Today's interventions were provided through individual discussion, instruction, and written materials were provided.      Patient verbalized understanding of instruction and written materials.  Pt was able to return back demonstration of instructions today. Patient understood key points, needs reinforcement and further instruction.     Diabetes Self-Management Care Plan:    Today's Diabetes Self-Management Care Plan was developed with Jarrod's input. Jarrod has agreed to work toward the following goal(s) to improve his/her overall diabetes control.      Care Plan: Diabetes Management   Updates made since 5/28/2024 12:00 AM        Problem: Healthy Eating         Goal: Eat 2-3 meals daily with 30-45g/2-3 servings of Carbohydrate per meal. Limit snacking in between meal to 1 serving (15 grams). Completed 6/26/2024   Start Date: 12/7/2023   Expected End Date: 12/28/2023   Recent Progress: No change   Priority: High   Barriers: No Barriers Identified   Note:    12/7/23 - - We concentrated on portion sizes at today's session. Overall meal planning and various choices for meals. Discussed carb vs non-carb foods, appropriate amount of carbs to have at  meals/snacks and acceptable serving sizes of individual carb items. Obtained a 24-hr food recall from patient. Discussed various meal plan options to promote healthy eating. Discussed the importance of avoiding sugary drinks and replace with non-caloric drinks. Pt verbalized understanding.     - - Practiced reading food labels on various food items with patient focusing on serving size and total carbohydrate intake (not sugar intake). Instructed on appropriate serving sizes of specific carb containing foods. Discussed having lean protein at all meals and adding non starchy vegetables at lunch and dinner. Instructed pt to aim for 2-3 evenly spaced meals or a small carb snack in place of a missed meal. Written education information provided to patient for use at home. Pt verbalized understanding of all the above.      Care Plan Update 12/28/23 - - Pt hasn't been closely monitoring his carb intake. We reviewed carb sources of foods and appropriate serving sizes of carbs at meal and snack time. Pt says he still eat fast foods; we discussed choosing healthier choices when eating fast foods. Discussed having lean protein at all meals and non starchy vegetables at lunch and dinner. Instructed pt to aim for 3 evenly spaced meals or a small carb snack in place of a missed meal. Pt verbalized understanding of all the above.          Task: Reviewed the sources and role of Carbohydrate, Protein, and Fat and how each nutrient impacts blood sugar. Completed 6/27/2024        Task: Discussed strategies for choosing healthier menu options when dining out. Completed 6/27/2024        Problem: Medications         Goal: Patient Agrees to take Diabetes Medication as prescribed.    Start Date: 6/26/2024   Priority: High   Barriers: No Barriers Identified   Note:    6/26/24 - - Pt is currently on Glimepiride 4mg daily and Tradjenta 5mg daily. He has not been taking Tradjenta. We discussed the importance of taking his diabetes medication to  help bring his A1C to a healthy range. We also dicussed both of his diabetes medications including the MOA and side-effects. Pt verbalized understanding and agreed to follow medication plan of treatment.            Problem: Healthy Eating         Goal: Eat 2-3 meals daily with 30-45g/2-3 servings of Carbohydrate per meal. Limit snacking in between meal to 1 serving (15 grams).    Start Date: 6/26/2024   Priority: High   Barriers: No Barriers Identified   Note:    6/26/24 - - We reviewed eating right with diabetes. Pt says he eat fast food a lot due to his work schedule. We discussed importance of planning healthy meals. Also discussed eating balanced meals with vegetables, fruits, lean meats, and whole grains. We concentrated on portion sizes and discussed carb vs non-carb foods, appropriate amount of carbs to have at meals/snacks and acceptable serving sizes of individual carb items.     - - Practiced reading food labels on various food items with patient focusing on serving size and total carbohydrate intake (not sugar intake). Instructed on appropriate serving sizes of specific carb containing foods.  Stressed importance of eating a protein at each meal and include non-starchy vegetables at lunch/dinner. Instructed pt to aim for 2-3 evenly spaced meals or a small carb snack in place of a missed meal. Written education information provided to patient for use at home. Pt verbalized understanding of all the above.       Follow Up Plan     Follow up for Pt's next f/u appointment is with the NP.    Today's care plan and follow up schedule was discussed with patient.  Jarrod verbalized understanding of the care plan, goals, and agrees to follow up plan.        The patient was encouraged to communicate with his/her health care provider/physician and care team regarding his/her condition(s) and treatment.  I provided the patient with my contact information today and encouraged to contact me via phone or [facility] Patient  Portal as needed.     Length of Visit   Total Time: 60 Minutes

## 2024-08-26 ENCOUNTER — PATIENT MESSAGE (OUTPATIENT)
Dept: ADMINISTRATIVE | Facility: HOSPITAL | Age: 54
End: 2024-08-26
Payer: MEDICAID

## 2024-08-28 ENCOUNTER — HOSPITAL ENCOUNTER (EMERGENCY)
Facility: HOSPITAL | Age: 54
Discharge: HOME | End: 2024-08-28
Attending: EMERGENCY MEDICINE
Payer: MEDICAID

## 2024-08-28 VITALS
HEIGHT: 73 IN | SYSTOLIC BLOOD PRESSURE: 139 MMHG | TEMPERATURE: 98 F | DIASTOLIC BLOOD PRESSURE: 80 MMHG | BODY MASS INDEX: 27.83 KG/M2 | WEIGHT: 210 LBS | OXYGEN SATURATION: 98 % | RESPIRATION RATE: 19 BRPM | HEART RATE: 82 BPM

## 2024-08-28 DIAGNOSIS — M17.12 TRICOMPARTMENT OSTEOARTHRITIS OF LEFT KNEE: Primary | ICD-10-CM

## 2024-08-28 DIAGNOSIS — M25.562 LEFT KNEE PAIN: ICD-10-CM

## 2024-08-28 PROCEDURE — 99284 EMERGENCY DEPT VISIT MOD MDM: CPT | Mod: 25,ER

## 2024-08-28 PROCEDURE — 25000003 PHARM REV CODE 250: Mod: ER

## 2024-08-28 RX ORDER — DICLOFENAC SODIUM 10 MG/G
2 GEL TOPICAL 3 TIMES DAILY
Qty: 50 G | Refills: 0 | Status: SHIPPED | OUTPATIENT
Start: 2024-08-28

## 2024-08-28 RX ORDER — METHOCARBAMOL 500 MG/1
500 TABLET, FILM COATED ORAL 4 TIMES DAILY
Qty: 20 TABLET | Refills: 0 | Status: SHIPPED | OUTPATIENT
Start: 2024-08-28 | End: 2024-09-02

## 2024-08-28 RX ORDER — METHOCARBAMOL 750 MG/1
1500 TABLET, FILM COATED ORAL
Status: COMPLETED | OUTPATIENT
Start: 2024-08-28 | End: 2024-08-28

## 2024-08-28 RX ORDER — ACETAMINOPHEN 500 MG
500 TABLET ORAL EVERY 6 HOURS PRN
Qty: 20 TABLET | Refills: 0 | Status: SHIPPED | OUTPATIENT
Start: 2024-08-28

## 2024-08-28 RX ADMIN — METHOCARBAMOL 1500 MG: 750 TABLET ORAL at 09:08

## 2024-08-28 NOTE — Clinical Note
"Jarrod Sanchezdaniela Mercado was seen and treated in our emergency department on 8/28/2024.  He may return to work on 08/31/2024.       If you have any questions or concerns, please don't hesitate to call.      Ghulam Stallings PA-C"

## 2024-08-29 NOTE — DISCHARGE INSTRUCTIONS
You were seen in the emergency department today for knee pain.  Follow up with Orthopedics.  Please take all medications as prescribed and as we discussed.  Follow-up with specialist if instructed to do so.  It is important to remember that some problems are difficult to diagnose and may not be found during your Emergency Department visit. Be sure to follow up with your primary care doctor and review all labs/imaging/tests that were performed during this visit with them. Some labs/tests may be outside of the normal range and require non-emergent follow-up and further investigation to help diagnose/exclude/prevent complications or other medical conditions. Return to the emergency department for any new or worsening symptoms. Thank you for allowing me to care for you today, it was my pleasure. I hope you get to feeling better soon!

## 2024-08-29 NOTE — ED PROVIDER NOTES
Encounter Date: 8/28/2024       History     Chief Complaint   Patient presents with    Knee Pain     PT REPORTS LEFT KNEE PAIN FOR A FEW WEEKS, REPORTS TWISTED IT     Patient is a 53-year-old male with a past medical history of hypertension, hyperlipidemia, diabetes who presents to the emergency department for evaluation of left knee pain x 2.5 weeks.  Reports at that time he was cutting grass and stepped in a hole twisting his knee.  Denies actually falling.  Did not hit his head or lose consciousness.  Reports he had some pain was still able to walk and symptoms resolved.  After working on his feet for a long period of time today, he reports constant aching in the left knee.  Denies numbness or tingling.  Denies swelling, color change.  Denies history of arthritis.  Denies history of gout.  Denies fever, vomiting.  No other complaints.    The history is provided by the patient.     Review of patient's allergies indicates:  No Known Allergies  Past Medical History:   Diagnosis Date    Diabetes mellitus     Diabetes mellitus type I     Hyperlipidemia associated with type 2 diabetes mellitus 9/27/2023    Hypertension      Past Surgical History:   Procedure Laterality Date    COLONOSCOPY  01/28/2022    COLONOSCOPY N/A 01/28/2022    Procedure: COLONOSCOPY;  Surgeon: Don Mon MD;  Location: [facility];  Service: Endoscopy;  Laterality: N/A;    INCISION AND DRAINAGE OF HAND Right 3/4/2024    Procedure: INCISION AND DRAINAGE, HAND;  Surgeon: Wilfrido Prado MD;  Location: [facility] OR;  Service: Orthopedics;  Laterality: Right;  possible I&D depending on how incision looks morning of, cultures    INCISION AND DRAINAGE OF WOUND  03/04/2024    Middle finger and thumb    OPEN REDUCTION AND INTERNAL FIXATION (ORIF) OF INJURY OF FINGER  03/04/2024    Middle finger    OPEN REDUCTION AND INTERNAL FIXATION (ORIF) OF INJURY OF FINGER Right 3/4/2024    Procedure: ORIF, FINGER;  Surgeon: Wilfrido Prado MD;  Location: [facility]  OR;  Service: Orthopedics;  Laterality: Right;  right middle finger, c-arm, accumed    right knee       Family History   Problem Relation Name Age of Onset    Kidney disease Mother      Hypertension Father      Diabetes type II Father       Social History     Tobacco Use    Smoking status: Never    Smokeless tobacco: Never   Substance Use Topics    Alcohol use: Yes     Comment: occasionally    Drug use: No     Review of Systems   Constitutional:  Negative for chills and fever.   Respiratory:  Negative for shortness of breath.    Cardiovascular:  Negative for chest pain.   Gastrointestinal:  Negative for abdominal pain, nausea and vomiting.   Genitourinary:  Negative for decreased urine volume.   Musculoskeletal:  Positive for arthralgias. Negative for joint swelling.   Skin:  Negative for color change.   Neurological:  Negative for dizziness, light-headedness, numbness and headaches.       Physical Exam     Initial Vitals [08/28/24 2007]   BP Pulse Resp Temp SpO2   139/80 82 19 98.1 °F (36.7 °C) 98 %      MAP       --         Physical Exam    Nursing note and vitals reviewed.  Constitutional: He appears well-developed and well-nourished.   HENT:   Head: Normocephalic and atraumatic.   Right Ear: External ear normal.   Left Ear: External ear normal.   Neck: Carotid bruit is not present.   Normal range of motion.  Cardiovascular:  Normal rate, regular rhythm, normal heart sounds and intact distal pulses.     Exam reveals no gallop and no friction rub.       No murmur heard.  Pulmonary/Chest: Breath sounds normal. No respiratory distress. He has no wheezes. He has no rhonchi. He has no rales.   Abdominal: Abdomen is soft. Bowel sounds are normal. He exhibits no distension. There is no abdominal tenderness. There is no rebound and no guarding.   Musculoskeletal:         General: Normal range of motion.      Cervical back: Normal range of motion.      Comments: There is normal flexion and extension of the left knee  without difficulty.  No obvious deformity, swelling, color change.  Negative anterior and posterior drawer test.  Negative Tanja test.  Negative Lachman's test.  2+ DP pulse.  Neurovascularly intact.  Patient is ambulatory.     Neurological: He is alert and oriented to person, place, and time. GCS score is 15. GCS eye subscore is 4. GCS verbal subscore is 5. GCS motor subscore is 6.   Psychiatric: He has a normal mood and affect.         ED Course   Procedures  Labs Reviewed - No data to display       Imaging Results              X-Ray Knee 3 View Left (Final result)  Result time 08/28/24 21:23:57      Final result by Trent Sun DO (08/28/24 21:23:57)                   Impression:      No acute osseous abnormality.    Tricompartmental osteoarthritis as above.      Electronically signed by: Trent Sun  Date:    08/28/2024  Time:    21:23               Narrative:    EXAMINATION:  XR KNEE 3 VIEW LEFT    CLINICAL HISTORY:  Pain in left knee    TECHNIQUE:  AP, lateral, and Merchant views of the left knee were performed.    COMPARISON:  None    FINDINGS:  There is osteopenia.  There is no acute fracture or dislocation of the knee.  Alignment is normal.  There is joint space narrowing of the medial and lateral compartments, mild, and the patellofemoral compartment, moderate, with tricompartmental marginal osteophytes.  No joint effusion.                                       Medications   methocarbamoL tablet 1,500 mg (1,500 mg Oral Given 8/28/24 2128)     Medical Decision Making  This is an emergent evaluation of a 53-year-old male with a past medical history of hypertension, hyperlipidemia, diabetes who presents to the emergency department for evaluation of left knee pain x 2.5 weeks.  Reports at that time he was cutting grass and stepped in a hole twisting his knee.    Patient looks well clinically. There is normal flexion and extension of the left knee without difficulty.  No obvious deformity, swelling,  color change.  Negative anterior and posterior drawer test.  Negative Tanja test.  Negative Lachman's test.  2+ DP pulse.  Neurovascularly intact.  Patient is ambulatory. Regular rate rhythm without murmurs.  No carotid bruits appreciated on exam. Lungs are clear to auscultation bilaterally.  Abdomen is soft, nontender, non distended, with normal bowel sounds.     Differential diagnosis includes but is not limited to fracture, dislocation, sprain, ligamentous injury.  Considered septic joint but highly doubtful given no joint erythema, swelling, mild range of motion, and no systemic symptoms.  Considered but doubt gout.    Workup initiated with x-ray.  Ordered Robaxin.  Vital signs, chart, labs, and/or imaging were all reviewed.  See ED course below and interpretations above. My overall impression is osteoarthritis. Will discharge home with Robaxin, diclofenac gel, Tylenol with orthopedic follow-up.  No emergent pathology today. Patient is very well appearing, and in no acute distress. Vital signs are reassuring here in the emergency department, patient is afebrile, breathing comfortable, satting 98 % on room air. Patient/Caregiver is stable for discharge at this time.  Patient/Caregiver was informed of results and plan of care. Patient/Caregiver verbalized understanding of care plan. All questions and concerns were addressed. Discussed strict return precautions with the patient/caregiver. Instructed follow up with primary care provider within 1 week.      Ghulam Stallings PA-C    DISCLAIMER: This note was prepared with [employer] voice recognition transcription software. Garbled syntax, mangled pronouns, and other bizarre constructions may be attributed to that software system.       Amount and/or Complexity of Data Reviewed  Radiology: ordered. Decision-making details documented in ED Course.    Risk  OTC drugs.  Prescription drug management.               ED Course as of 08/28/24 2137   Wed Aug 28, 2024   2110 BP:  139/80 [TM]   2110 Temp: 98.1 °F (36.7 °C) [TM]   2110 Pulse: 82 [TM]   2110 Resp: 19 [TM]   2110 SpO2: 98 % [TM]   2128 X-Ray Knee 3 View Left  There is osteopenia.  There is no acute fracture or dislocation of the knee.  Alignment is normal.  There is joint space narrowing of the medial and lateral compartments, mild, and the patellofemoral compartment, moderate, with tricompartmental marginal osteophytes.  No joint effusion. [TM]   2136 Informed patient of results.  Suspect symptoms likely due to tricompartmental disease of the left knee.  I would normally like to do a course of anti-inflammatories and steroids however patient has decreased GFR on recent labs in EMR as well as hemoglobin A1c at 9%. [TM]      ED Course User Index  [TM] Ghulam Stallings PA-C                           Clinical Impression:  Final diagnoses:  [M25.562] Left knee pain  [M17.12] Tricompartment osteoarthritis of left knee (Primary)          ED Disposition Condition    Discharge Stable          ED Prescriptions       Medication Sig Dispense Start Date End Date Auth. Provider    methocarbamoL (ROBAXIN) 500 MG Tab Take 1 tablet (500 mg total) by mouth 4 (four) times daily. for 5 days 20 tablet 8/28/2024 9/2/2024 Ghulam Stallings PA-C    acetaminophen (TYLENOL) 500 MG tablet Take 1 tablet (500 mg total) by mouth every 6 (six) hours as needed. 20 tablet 8/28/2024 -- Ghulam Stallings PA-C    diclofenac sodium (VOLTAREN) 1 % Gel Apply 2 g topically 3 (three) times daily. 50 g 8/28/2024 -- Ghulam Stallings PA-C          Follow-up Information       Follow up With Specialties Details Why Contact Info    [facility] ED Emergency Medicine Go to  As needed, If symptoms worsen, or new symptoms develop [address] [id number] [phone]    Primary care doctor  Schedule an appointment as soon as possible for a visit in 3 days      Valente Wing III, MD Orthopedic Surgery Call in 2 days  [address]  [address]  [facility] [address]  [phone]               Ghulam Stallings, DASHAWN  08/28/24 [id number]

## 2024-08-30 ENCOUNTER — TELEPHONE (OUTPATIENT)
Dept: FAMILY MEDICINE | Facility: CLINIC | Age: 54
End: 2024-08-30
Payer: MEDICAID

## 2024-08-30 NOTE — TELEPHONE ENCOUNTER
Spoke w/ PT. He did NOT call. He was not aware of call.  Contacted medical records company and gave phone number to [facility] medical records so that they could request the patient's records.

## 2024-08-30 NOTE — TELEPHONE ENCOUNTER
----- Message from Janelle Posadas sent at 8/30/2024  9:43 AM CDT -----  Regarding: Return call  .Type:  Patient Returning Call    Who Called: self     Who Left Message for Patient: Jazlyn Chatman LPN    Does the patient know what this is regarding?:yes    Would the patient rather a call back or a response via [facility]? Call     Best Call Back Number:...[phone]        Additional Information:

## 2024-08-30 NOTE — TELEPHONE ENCOUNTER
----- Message from Carolina Elamz sent at 8/30/2024  8:51 AM CDT -----  Contact: Pt [phone]  1MEDICALADVICE     Patient is calling for Medical Advice regarding: Rep from [employer] is calling regarding, fax request sent over on 08/19/24 for all the legal request for medical,billing & radiology. Trying to check on the status of those documents being faxed back.    Patient wants a call back or thru [facility]: fax number: [phone]  Phone number:    Comments: Pt file number: [id number]  phone number:[phone]    Please advise patient replies from provider may take up to 48 hours.      Please Call and advise    Thank you    Please do NOT rep[ly to sender as this is from the call center and they answer incoming calls only.

## 2024-09-04 ENCOUNTER — OFFICE VISIT (OUTPATIENT)
Dept: ENDOCRINOLOGY | Facility: CLINIC | Age: 54
End: 2024-09-04
Payer: MEDICAID

## 2024-09-04 ENCOUNTER — LAB VISIT (OUTPATIENT)
Dept: LAB | Facility: HOSPITAL | Age: 54
End: 2024-09-04
Attending: NURSE PRACTITIONER
Payer: MEDICAID

## 2024-09-04 VITALS
HEART RATE: 63 BPM | TEMPERATURE: 98 F | DIASTOLIC BLOOD PRESSURE: 76 MMHG | SYSTOLIC BLOOD PRESSURE: 128 MMHG | BODY MASS INDEX: 28.89 KG/M2 | WEIGHT: 219 LBS

## 2024-09-04 DIAGNOSIS — E11.65 TYPE 2 DIABETES MELLITUS WITH HYPERGLYCEMIA, WITH LONG-TERM CURRENT USE OF INSULIN: Primary | ICD-10-CM

## 2024-09-04 DIAGNOSIS — Z79.4 TYPE 2 DIABETES MELLITUS WITH HYPERGLYCEMIA, WITH LONG-TERM CURRENT USE OF INSULIN: Primary | ICD-10-CM

## 2024-09-04 DIAGNOSIS — Z79.4 TYPE 2 DIABETES MELLITUS WITH HYPERGLYCEMIA, WITH LONG-TERM CURRENT USE OF INSULIN: ICD-10-CM

## 2024-09-04 DIAGNOSIS — I10 HYPERTENSION, UNSPECIFIED TYPE: ICD-10-CM

## 2024-09-04 DIAGNOSIS — N18.32 STAGE 3B CHRONIC KIDNEY DISEASE: ICD-10-CM

## 2024-09-04 DIAGNOSIS — E11.65 TYPE 2 DIABETES MELLITUS WITH HYPERGLYCEMIA, WITH LONG-TERM CURRENT USE OF INSULIN: ICD-10-CM

## 2024-09-04 LAB
ESTIMATED AVG GLUCOSE: 137 MG/DL (ref 68–131)
GLUCOSE SERPL-MCNC: 150 MG/DL (ref 70–110)
HBA1C MFR BLD: 6.4 % (ref 4–5.6)

## 2024-09-04 PROCEDURE — 4010F ACE/ARB THERAPY RXD/TAKEN: CPT | Mod: CPTII,,, | Performed by: NURSE PRACTITIONER

## 2024-09-04 PROCEDURE — 99999 PR PBB SHADOW E&M-EST. PATIENT-LVL IV: CPT | Mod: PBBFAC,,, | Performed by: NURSE PRACTITIONER

## 2024-09-04 PROCEDURE — 99214 OFFICE O/P EST MOD 30 MIN: CPT | Mod: PBBFAC | Performed by: NURSE PRACTITIONER

## 2024-09-04 PROCEDURE — 99999PBSHW POCT GLUCOSE, HAND-HELD DEVICE: Mod: PBBFAC,,,

## 2024-09-04 PROCEDURE — 99214 OFFICE O/P EST MOD 30 MIN: CPT | Mod: S$PBB,,, | Performed by: NURSE PRACTITIONER

## 2024-09-04 PROCEDURE — 82962 GLUCOSE BLOOD TEST: CPT | Mod: PBBFAC | Performed by: NURSE PRACTITIONER

## 2024-09-04 PROCEDURE — 3008F BODY MASS INDEX DOCD: CPT | Mod: CPTII,,, | Performed by: NURSE PRACTITIONER

## 2024-09-04 PROCEDURE — 3066F NEPHROPATHY DOC TX: CPT | Mod: CPTII,,, | Performed by: NURSE PRACTITIONER

## 2024-09-04 PROCEDURE — 3074F SYST BP LT 130 MM HG: CPT | Mod: CPTII,,, | Performed by: NURSE PRACTITIONER

## 2024-09-04 PROCEDURE — 1160F RVW MEDS BY RX/DR IN RCRD: CPT | Mod: CPTII,,, | Performed by: NURSE PRACTITIONER

## 2024-09-04 PROCEDURE — 1159F MED LIST DOCD IN RCRD: CPT | Mod: CPTII,,, | Performed by: NURSE PRACTITIONER

## 2024-09-04 PROCEDURE — 3078F DIAST BP <80 MM HG: CPT | Mod: CPTII,,, | Performed by: NURSE PRACTITIONER

## 2024-09-04 PROCEDURE — 3060F POS MICROALBUMINURIA REV: CPT | Mod: CPTII,,, | Performed by: NURSE PRACTITIONER

## 2024-09-04 PROCEDURE — 83036 HEMOGLOBIN GLYCOSYLATED A1C: CPT | Performed by: NURSE PRACTITIONER

## 2024-09-04 PROCEDURE — 3046F HEMOGLOBIN A1C LEVEL >9.0%: CPT | Mod: CPTII,,, | Performed by: NURSE PRACTITIONER

## 2024-09-04 PROCEDURE — G2211 COMPLEX E/M VISIT ADD ON: HCPCS | Mod: S$PBB,,, | Performed by: NURSE PRACTITIONER

## 2024-09-04 PROCEDURE — 36415 COLL VENOUS BLD VENIPUNCTURE: CPT | Performed by: NURSE PRACTITIONER

## 2024-09-04 RX ORDER — LINAGLIPTIN 5 MG/1
5 TABLET, FILM COATED ORAL DAILY
Qty: 90 TABLET | Refills: 1 | Status: SHIPPED | OUTPATIENT
Start: 2024-09-04

## 2024-09-04 RX ORDER — LANCETS
EACH MISCELLANEOUS
Qty: 200 EACH | Refills: 3 | Status: SHIPPED | OUTPATIENT
Start: 2024-09-04

## 2024-09-04 RX ORDER — GLIMEPIRIDE 4 MG/1
4 TABLET ORAL
Qty: 90 TABLET | Refills: 1 | Status: SHIPPED | OUTPATIENT
Start: 2024-09-04

## 2024-09-04 NOTE — TELEPHONE ENCOUNTER
----- Message from Sandra Henry sent at 9/4/2024  3:52 PM CDT -----  Regarding: Pharmacy Call Back  Type:  Pharmacy Calling to Clarify an RX    Name of Caller: Tia     Pharmacy Name: [facility]     Prescription Name:  Disp Refills Start End AILYN  blood sugar diagnostic Strp             What do they need to clarify? Needs diagnosis code     Can you be contacted via [facility]? No     Best Call Back Number: [phone]     Fax [phone]

## 2024-09-04 NOTE — PATIENT INSTRUCTIONS
A1C goal: <7%  Fasting/premeal blood glucose goal:   2 hour post-meal blood glucose goal: less than 180    Improve diet.   Foods that are low in carbohydrates and high in protein and non-starchy vegetables. Avoid processed foods.   Avoid beverages with sugar.   Continue Tradjenta and glimepiride.   Encouraged pt to start testing glucoses - ideally 2x/day but at least 1x/day.     Return to clinic in 3 months with labs prior.

## 2024-09-04 NOTE — PROGRESS NOTES
"CC: This 53 y.o. Black or  male  is here for evaluation of  T2DM along with comorbidities indicated in the Visit Diagnosis section of this encounter.    HPI: Jarrod Mercado was diagnosed with T2DM in his early 50s. Metformin started at the time of diagnosis.   Extensive fam h/o diabetes: mother with ESRD, father, MGF     DM COMPLICATIONS: nephropathy      Initial visit 11/2023  New to Endocrine. Referred by Primary. He did not know what this visit was for.   The only medications pt takes is losartan and glimepiride. 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   Did not know what Tradjenta was so he didn't start it.   Sometimes take Jardiance if he runs out of glimepiride.   Plan   Avoid beverages with sugar.   Improve diet - avoid cookies/candy.   See Diabetes Education.   Patient not open taking injections. Discussed Rybelsus but pt declines today.   Start Tradjenta once daily.   Hold Jardiance for now d/t risk of  infections with significant hyperglycemia.   Test sugar 2x/day, fasting and bedtime. Bring meter /written sugar log to every visit.   Return to clinic in 3 months with labs prior.       Interval hx  Pt has not been seen since initial eval almost a year ago.   Last A1c was 9.5% in Feb, down from 12.2% last Sept.     He has not been testing his BGs. Does not like needles.Requests refills for Tradjenta and glimepiride. He started Tradjenta a bit more than a month ago when his pharmacist told him to take it with the glimepiride.  He was afraid to start Tradjenta  because of potential s/e.     He reports diet is his biggest problem. "Slowed down" intake of sodas and fast food.          LAST DIABETES EDUCATION: 6/2024       HOSPITALIZED FOR DIABETES OR RELATED COMPLICATIONS -  No.    SIGNIFICANT DIABETES MED HISTORY:   Metformin - diarrhea on once daily schedule     PRESCRIBED DIABETES MEDICATIONS:   Glimepiride 4 " mg once daily   Tradjenta 5 mg once daily     Misses medication doses - yes, as above. Always take glimepiride.     SELF MONITORING BLOOD GLUCOSE: Monitors blood glucose at home - none. Does not like needles.    POCT glucose 150       HYPOGLYCEMIC EPISODES: denies symptoms.      CURRENT DIET: drinks diet soda, a few small Gatorades per day, milk, water.   Eats 2-3 meals/day. Skips lunch often. Dinner is often late.     Admits that he's a finicky eater. Enjoys carbs and eats fast food a lot during work. Likes cookies and milk at night.     Diet recall: dinner was chicken, green beans, diet coke. Overnight snack - 2 cookies with milk. Lunch was big mac combo with fries and diet soda. Breakfast was skipped.     CURRENT EXERCISE:     SOCIAL:  Lives alone       /76   Pulse 63   Temp 98 °F (36.7 °C)   Wt 99.3 kg (219 lb)   BMI 28.89 kg/m²     ROS:   CONSTITUTIONAL: Appetite good, denies fatigue         PHYSICAL EXAM:  GENERAL: Well developed, well nourished. No acute distress.   PSYCH: AAOx3, appropriate mood and affect, conversant, well-groomed. Judgement and insight good.   NEURO: Cranial nerves grossly intact. Speech clear.   CHEST: Respirations even and unlabored.       Hemoglobin A1C   Date Value Ref Range Status   02/28/2024 9.5 (H) 4.0 - 5.6 % Final     Comment:     ADA Screening Guidelines:  5.7-6.4%  Consistent with prediabetes  >or=6.5%  Consistent with diabetes    High levels of fetal hemoglobin interfere with the HbA1C  assay. Heterozygous hemoglobin variants (HbS, HgC, etc)do  not significantly interfere with this assay.   However, presence of multiple variants may affect accuracy.     02/27/2024 9.3 (H) 4.0 - 5.6 % Final     Comment:     ADA Screening Guidelines:  5.7-6.4%  Consistent with prediabetes  >or=6.5%  Consistent with diabetes    High levels of fetal hemoglobin interfere with the HbA1C  assay. Heterozygous hemoglobin variants (HbS, HgC, etc)do  not significantly interfere with this assay.  "  However, presence of multiple variants may affect accuracy.     09/26/2023 12.2 (H) <5.7 % of total Hgb Final     Comment:     For someone without known diabetes, a hemoglobin A1c  value of 6.5% or greater indicates that they may have   diabetes and this should be confirmed with a follow-up   test.     For someone with known diabetes, a value <7% indicates   that their diabetes is well controlled and a value   greater than or equal to 7% indicates suboptimal   control. A1c targets should be individualized based on   duration of diabetes, age, comorbid conditions, and   other considerations.     Currently, no consensus exists regarding use of  hemoglobin A1c for diagnosis of diabetes for children.             No results found for: "CPEPTIDE", "GLUTAMICACID", "ISLETCELLANT", "FRUCTOSAMINE"     Lab Results   Component Value Date    CHOL 241 (H) 09/26/2023    CHOL 173 08/26/2022     Lab Results   Component Value Date    HDL 59 09/26/2023    HDL 53 08/26/2022     Lab Results   Component Value Date    LDLCALC 147 (H) 09/26/2023    LDLCALC 86.4 08/26/2022     Lab Results   Component Value Date    TRIG 210 (H) 09/26/2023    TRIG 168 (H) 08/26/2022     Lab Results   Component Value Date    CHOLHDL 4.1 09/26/2023    CHOLHDL 30.6 08/26/2022           Component Value Date/Time     (L) 02/23/2024 1627    K 3.7 02/23/2024 1627     02/23/2024 1627    CO2 23 02/23/2024 1627    BUN 15 02/23/2024 1627    CREATININE 1.8 (H) 02/23/2024 1627     (H) 02/23/2024 1627    CALCIUM 9.4 02/23/2024 1627    ALKPHOS 74 02/23/2024 1627    AST 17 02/23/2024 1627    ALT 19 02/23/2024 1627    BILITOT 0.7 02/23/2024 1627    EGFRNORACEVR 44 (A) 02/23/2024 1627    EGFRNORACEVR 48 (L) 09/26/2023 1431         Lab Results   Component Value Date    LABMICR 175.0 02/28/2024    CREATRANDUR 207.0 02/28/2024    MICALBCREAT 84.5 (H) 02/28/2024             ASSESSMENT and PLAN:    A1C GOAL: < 7 %     1. Type 2 diabetes mellitus with " hyperglycemia, with long-term current use of insulin  Discussed progression of DM disease, long term complications, and tx options. Reviewed A1c and BG goals.       Improve diet.   Foods that are low in carbohydrates and high in protein and non-starchy vegetables. Avoid processed foods.     Avoid beverages with sugar.   Continue Tradjenta and glimepiride.   Encouraged pt to start testing glucoses - ideally 2x/day but at least 1x/day.     Return to clinic in 3 months with labs prior.       POCT Glucose, Hand-Held Device    Lipid Panel    Hemoglobin A1C      2. Stage 3b chronic kidney disease  Continue losartan   Will discuss starting back on jardiance at next visit.   Pt declines Nephrology referral today.       3. Hypertension, unspecified type  controlled             Orders Placed This Encounter   Procedures    Lipid Panel     Standing Status:   Future     Standing Expiration Date:   9/4/2025     Order Specific Question:   Send normal result to authorizing provider's In Basket if patient is active on MyChart:     Answer:   Yes    Hemoglobin A1C     Standing Status:   Future     Standing Expiration Date:   11/3/2025    POCT Glucose, Hand-Held Device        Follow up in about 3 months (around 12/4/2024).

## 2024-09-26 ENCOUNTER — TELEPHONE (OUTPATIENT)
Dept: FAMILY MEDICINE | Facility: CLINIC | Age: 54
End: 2024-09-26
Payer: MEDICAID

## 2024-09-26 NOTE — TELEPHONE ENCOUNTER
Spoke w/ PT to give him number to medical records to fill out a release of information for [facility].  Sent number via [employer] message. He will call to complete form.

## 2024-09-26 NOTE — TELEPHONE ENCOUNTER
----- Message from Carolina Bran sent at 9/26/2024  4:18 PM CDT -----  Contact: Pt [phone]  1MEDICALADVICE     Patient is calling for Medical Advice regarding: Rep calling from [employer] is calling to f/u on fax request sent to obtain medical , billing & radiology records for of patient.    Patient wants a call back or thru [facility]: phone number: [phone]  Fax number: [phone]    Comments: rep states last time someone from the office called the clinic & was told the request was processing or under review, something like that to be fax back to them.    Please advise patient replies from provider may take up to 48 hours.      Please Call and advise    Thank you    Please do NOT rep[ly to sender as this is from the call center and they answer incoming calls only.

## 2024-10-28 ENCOUNTER — HOSPITAL ENCOUNTER (EMERGENCY)
Facility: HOSPITAL | Age: 54
Discharge: HOME | End: 2024-10-28
Attending: INTERNAL MEDICINE
Payer: MEDICAID

## 2024-10-28 VITALS
TEMPERATURE: 98 F | DIASTOLIC BLOOD PRESSURE: 84 MMHG | RESPIRATION RATE: 18 BRPM | OXYGEN SATURATION: 96 % | BODY MASS INDEX: 29.03 KG/M2 | HEART RATE: 66 BPM | HEIGHT: 73 IN | WEIGHT: 219 LBS | SYSTOLIC BLOOD PRESSURE: 152 MMHG

## 2024-10-28 DIAGNOSIS — S62.327A CLOSED DISPLACED FRACTURE OF SHAFT OF FIFTH METACARPAL BONE OF LEFT HAND, INITIAL ENCOUNTER: Primary | ICD-10-CM

## 2024-10-28 PROCEDURE — 99283 EMERGENCY DEPT VISIT LOW MDM: CPT | Mod: 25,ER

## 2024-10-28 PROCEDURE — 29125 APPL SHORT ARM SPLINT STATIC: CPT | Mod: LT,ER

## 2024-10-28 RX ORDER — IBUPROFEN 600 MG/1
600 TABLET ORAL EVERY 6 HOURS PRN
Qty: 20 TABLET | Refills: 0 | Status: SHIPPED | OUTPATIENT
Start: 2024-10-28

## 2024-10-28 NOTE — Clinical Note
"Jarrod Mansfield"Jess was seen and treated in our emergency department on 10/28/2024.  He may return to work after being cleared by follow-up physician .       If you have any questions or concerns, please don't hesitate to call.      Alissa Harper PA-C"

## 2024-10-29 NOTE — ED PROVIDER NOTES
Encounter Date: 10/28/2024       History     Chief Complaint   Patient presents with    Hand Injury     Pt c/o pain/swelling in L hand after crush injury on Thursday     Patient is a 54-year-old male with history of hypertension, hyperlipidemia, and diabetes who presents to the emergency department with left hand injury.  Patient reports a week and a half ago a heavy machinery equipment at work fell on the top of his hand.  Reports swelling and bruising.  Reports pain with range of motion.  Reports he waited so long because he was hoping it would improved.  Denies any open wounds.    The history is provided by the patient.     Review of patient's allergies indicates:  No Known Allergies  Past Medical History:   Diagnosis Date    Diabetes mellitus     Diabetes mellitus type I     Hyperlipidemia associated with type 2 diabetes mellitus 9/27/2023    Hypertension      Past Surgical History:   Procedure Laterality Date    COLONOSCOPY  01/28/2022    COLONOSCOPY N/A 01/28/2022    Procedure: COLONOSCOPY;  Surgeon: Don Mno MD;  Location: [facility];  Service: Endoscopy;  Laterality: N/A;    INCISION AND DRAINAGE OF HAND Right 3/4/2024    Procedure: INCISION AND DRAINAGE, HAND;  Surgeon: Wilfriod Prado MD;  Location: [facility] OR;  Service: Orthopedics;  Laterality: Right;  possible I&D depending on how incision looks morning of, cultures    INCISION AND DRAINAGE OF WOUND  03/04/2024    Middle finger and thumb    OPEN REDUCTION AND INTERNAL FIXATION (ORIF) OF INJURY OF FINGER  03/04/2024    Middle finger    OPEN REDUCTION AND INTERNAL FIXATION (ORIF) OF INJURY OF FINGER Right 3/4/2024    Procedure: ORIF, FINGER;  Surgeon: Wilfrido Prado MD;  Location: [facility] OR;  Service: Orthopedics;  Laterality: Right;  right middle finger, c-arm, accumed    right knee       Family History   Problem Relation Name Age of Onset    Kidney disease Mother      Hypertension Father      Diabetes type II Father       Social History      Tobacco Use    Smoking status: Never    Smokeless tobacco: Never   Substance Use Topics    Alcohol use: Yes     Comment: occasionally    Drug use: No     Review of Systems   Constitutional:  Negative for activity change, appetite change, chills, fatigue and fever.   HENT:  Negative for congestion, ear discharge, ear pain, nosebleeds, postnasal drip and sore throat.    Respiratory:  Negative for cough.    Cardiovascular:  Negative for chest pain.   Gastrointestinal:  Negative for abdominal pain.   Genitourinary:  Negative for dysuria.   Musculoskeletal:  Negative for back pain.        Left hand pain   Neurological:  Negative for dizziness, light-headedness and headaches.       Physical Exam     Initial Vitals [10/28/24 2048]   BP Pulse Resp Temp SpO2   (!) 152/84 66 18 98.1 °F (36.7 °C) 96 %      MAP       --         Physical Exam    Nursing note and vitals reviewed.  Constitutional: He appears well-developed and well-nourished. He is not diaphoretic.  Non-toxic appearance. No distress.   HENT:   Head: Normocephalic.   Right Ear: Hearing and external ear normal.   Left Ear: Hearing and external ear normal. Mouth/Throat: Uvula is midline, oropharynx is clear and moist and mucous membranes are normal.   Eyes: Conjunctivae are normal. Pupils are equal, round, and reactive to light.   Neck:   Normal range of motion.  Cardiovascular:  Normal rate and regular rhythm.           Pulmonary/Chest: Breath sounds normal.   Abdominal: Abdomen is soft. Bowel sounds are normal. There is no abdominal tenderness.   Musculoskeletal:      Left hand: Swelling, deformity, tenderness and bony tenderness present. Decreased range of motion. Normal capillary refill. Normal pulse.      Cervical back: Normal range of motion.     Neurological: He is alert and oriented to person, place, and time.   Skin: Skin is warm and dry. Capillary refill takes less than 2 seconds.   Psychiatric: He has a normal mood and affect.         ED Course    Splint Application    Date/Time: 10/28/2024 9:26 PM    Performed by: Alissa Harper PA-C  Authorized by: Charles Mars MD  Location details: left hand  Splint type: ulnar gutter  Supplies used: Ortho-Glass  Post-procedure: The splinted body part was neurovascularly unchanged following the procedure.  Patient tolerance: Patient tolerated the procedure well with no immediate complications        Labs Reviewed - No data to display       Imaging Results              X-Ray Hand 3 view Left (In process)                      Medications - No data to display  Medical Decision Making  Urgent evaluation of a 54-year-old male who presents to the emergency department with left hand injury.  Patient has swelling to the dorsal hand.  Bony tenderness over the 5th metacarpal.  Neurovascularly intact.  X-ray confirms distal 5th metacarpal fracture with angulation.  With patient's injury being more than a week ago, will not attempt to align.  Placed in ulnar gutter splint and advised to follow up with hand clinic.  Advised to return to the emergency department with any worsening symptoms or concerns.    Amount and/or Complexity of Data Reviewed  Radiology: ordered.                                      Clinical Impression:  Final diagnoses:  [S62.327A] Closed displaced fracture of shaft of fifth metacarpal bone of left hand, initial encounter (Primary)          ED Disposition Condition    Discharge Stable          ED Prescriptions       Medication Sig Dispense Start Date End Date Auth. Provider    ibuprofen (ADVIL,MOTRIN) 600 MG tablet Take 1 tablet (600 mg total) by mouth every 6 (six) hours as needed for Pain. 20 tablet 10/28/2024 -- Alissa Harper PA-C          Follow-up Information    None          Alissa Harper PA-C  10/28/24 2127

## 2024-10-30 ENCOUNTER — TELEPHONE (OUTPATIENT)
Dept: ORTHOPEDICS | Facility: CLINIC | Age: 54
End: 2024-10-30
Payer: MEDICAID

## 2024-11-13 ENCOUNTER — TELEPHONE (OUTPATIENT)
Dept: ORTHOPEDICS | Facility: CLINIC | Age: 54
End: 2024-11-13

## 2024-11-13 ENCOUNTER — OFFICE VISIT (OUTPATIENT)
Dept: ORTHOPEDICS | Facility: CLINIC | Age: 54
End: 2024-11-13
Payer: MEDICAID

## 2024-11-13 VITALS
WEIGHT: 218.94 LBS | HEIGHT: 73 IN | DIASTOLIC BLOOD PRESSURE: 85 MMHG | HEART RATE: 73 BPM | SYSTOLIC BLOOD PRESSURE: 161 MMHG | BODY MASS INDEX: 29.02 KG/M2

## 2024-11-13 DIAGNOSIS — S62.327A CLOSED DISPLACED FRACTURE OF SHAFT OF FIFTH METACARPAL BONE OF LEFT HAND, INITIAL ENCOUNTER: Primary | ICD-10-CM

## 2024-11-13 PROCEDURE — 3044F HG A1C LEVEL LT 7.0%: CPT | Mod: CPTII,,,

## 2024-11-13 PROCEDURE — 3060F POS MICROALBUMINURIA REV: CPT | Mod: CPTII,,,

## 2024-11-13 PROCEDURE — 99215 OFFICE O/P EST HI 40 MIN: CPT | Mod: PBBFAC,PN

## 2024-11-13 PROCEDURE — 99999 PR PBB SHADOW E&M-EST. PATIENT-LVL V: CPT | Mod: PBBFAC,,,

## 2024-11-13 PROCEDURE — 4010F ACE/ARB THERAPY RXD/TAKEN: CPT | Mod: CPTII,,,

## 2024-11-13 PROCEDURE — 1160F RVW MEDS BY RX/DR IN RCRD: CPT | Mod: CPTII,,,

## 2024-11-13 PROCEDURE — 99213 OFFICE O/P EST LOW 20 MIN: CPT | Mod: S$PBB,,,

## 2024-11-13 PROCEDURE — 3077F SYST BP >= 140 MM HG: CPT | Mod: CPTII,,,

## 2024-11-13 PROCEDURE — 3066F NEPHROPATHY DOC TX: CPT | Mod: CPTII,,,

## 2024-11-13 PROCEDURE — 3079F DIAST BP 80-89 MM HG: CPT | Mod: CPTII,,,

## 2024-11-13 PROCEDURE — 3008F BODY MASS INDEX DOCD: CPT | Mod: CPTII,,,

## 2024-11-13 PROCEDURE — 1159F MED LIST DOCD IN RCRD: CPT | Mod: CPTII,,,

## 2024-11-13 NOTE — TELEPHONE ENCOUNTER
Spoke with pt. Pt notified of results. F/u appt and xrays scheduled. All questions answered. Pt verbalized understanding.

## 2024-11-13 NOTE — PROGRESS NOTES
SUBJECTIVE:      Chief Complaint: left boxer's fracture    History of Present Illness:  Patient is a 54 y.o. right hand dominant male who presents today with complaints of left boxer's fracture. Known to clinic for right hand, had surgery. Patient states regarding his left hand, had boxer's fracture 5-7 years ago, treated non-op. Then 3 weeks ago a sergey for trailer hit on his left finger and slammed on top of his left hand. Went to the ER and was found to have 5th metacarpal fracture. No numbness or tingling. This is not WC injury. He has been taking ibuprofen with mild improvement. He has been in a splint since 10/28/2024.     The patient is a/an self employed, cuts grass.    Onset of symptoms/DOI was 3 weeks.    Symptoms are aggravated by none.    Symptoms are alleviated by rest.    Symptoms consist of pain and swelling.    The patient rates their pain as a 4/10.    Attempted treatment(s) and/or interventions include activity modifications, rest, anti-inflammatory medications and immobilization.     The patient denies any fevers, chills, N/V, D/C and presents for evaluation.       Past Medical History:   Diagnosis Date    Diabetes mellitus     Diabetes mellitus type I     Hyperlipidemia associated with type 2 diabetes mellitus 9/27/2023    Hypertension      Past Surgical History:   Procedure Laterality Date    COLONOSCOPY  01/28/2022    COLONOSCOPY N/A 01/28/2022    Procedure: COLONOSCOPY;  Surgeon: Don Mon MD;  Location: [facility];  Service: Endoscopy;  Laterality: N/A;    INCISION AND DRAINAGE OF HAND Right 3/4/2024    Procedure: INCISION AND DRAINAGE, HAND;  Surgeon: Wilfrido Prado MD;  Location: [facility] OR;  Service: Orthopedics;  Laterality: Right;  possible I&D depending on how incision looks morning of, cultures    INCISION AND DRAINAGE OF WOUND  03/04/2024    Middle finger and thumb    OPEN REDUCTION AND INTERNAL FIXATION (ORIF) OF INJURY OF FINGER  03/04/2024    Middle finger    OPEN REDUCTION  AND INTERNAL FIXATION (ORIF) OF INJURY OF FINGER Right 3/4/2024    Procedure: ORIF, FINGER;  Surgeon: Wilfrido Prado MD;  Location: [facility];  Service: Orthopedics;  Laterality: Right;  right middle finger, c-arm, accumed    right knee       Review of patient's allergies indicates:  No Known Allergies  Social History     Social History Narrative    Not on file     Family History   Problem Relation Name Age of Onset    Kidney disease Mother      Hypertension Father      Diabetes type II Father           Current Outpatient Medications:     acetaminophen (TYLENOL) 500 MG tablet, Take 1 tablet (500 mg total) by mouth every 6 (six) hours as needed for Pain., Disp: 30 tablet, Rfl: 0    acetaminophen (TYLENOL) 500 MG tablet, Take 1 tablet (500 mg total) by mouth every 6 (six) hours as needed., Disp: 20 tablet, Rfl: 0    blood sugar diagnostic Strp, To check BG 2 times daily, to use with insurance preferred meter, Disp: 200 each, Rfl: 3    blood-glucose meter kit, To check BG 2 times daily, to use with insurance preferred meter, Disp: 1 each, Rfl: 0    cetirizine (ZYRTEC) 10 MG tablet, Take 1 tablet (10 mg total) by mouth once daily., Disp: 30 tablet, Rfl: 0    diclofenac sodium (VOLTAREN) 1 % Gel, Apply 2 g topically 3 (three) times daily., Disp: 50 g, Rfl: 0    diphenhydrAMINE (BENADRYL) 25 mg capsule, Take 1 capsule (25 mg total) by mouth every 6 (six) hours as needed for Itching or Allergies., Disp: 20 capsule, Rfl: 0    famotidine (PEPCID) 20 MG tablet, Take 1 tablet (20 mg total) by mouth 2 (two) times daily., Disp: 20 tablet, Rfl: 0    gabapentin (NEURONTIN) 300 MG capsule, TAKE 1 CAPSULE BY MOUTH IN THE EVENING, Disp: 90 capsule, Rfl: 0    glimepiride (AMARYL) 4 MG tablet, Take 1 tablet (4 mg total) by mouth before breakfast., Disp: 90 tablet, Rfl: 1    ibuprofen (ADVIL,MOTRIN) 600 MG tablet, Take 1 tablet (600 mg total) by mouth every 6 (six) hours as needed for Pain., Disp: 20 tablet, Rfl: 0    lancets Misc,  "To check BG 2 times daily, to use with insurance preferred meter, Disp: 200 each, Rfl: 3    linaGLIPtin (TRADJENTA) 5 mg Tab tablet, Take 1 tablet (5 mg total) by mouth once daily., Disp: 90 tablet, Rfl: 1    losartan (COZAAR) 50 MG tablet, Take 1 tablet by mouth once daily, Disp: 30 tablet, Rfl: 0    methocarbamoL (ROBAXIN) 500 MG Tab, Take 2 tablets (1,000 mg total) by mouth 3 (three) times daily as needed (Pain)., Disp: 30 tablet, Rfl: 0    ondansetron (ZOFRAN-ODT) 4 MG TbDL, Take 1 tablet (4 mg total) by mouth 2 (two) times daily., Disp: 20 tablet, Rfl: 0    oxyCODONE-acetaminophen (PERCOCET) 5-325 mg per tablet, Take 1 tablet by mouth every 4 (four) hours as needed for Pain., Disp: 28 each, Rfl: 0    sulfamethoxazole-trimethoprim 800-160mg (BACTRIM DS) 800-160 mg Tab, Take 1 tablet by mouth 2 (two) times daily., Disp: 14 tablet, Rfl: 0    tadalafiL (CIALIS) 5 MG tablet, Take 5 mg by mouth., Disp: , Rfl:     EPINEPHrine (EPIPEN 2-RAMONE) 0.3 mg/0.3 mL AtIn, Inject 0.3 mLs (0.3 mg total) into the muscle once. for 1 dose, Disp: 0.3 mL, Rfl: 0  No current facility-administered medications for this visit.    Facility-Administered Medications Ordered in Other Visits:     sodium chloride 0.9% flush 10 mL, 10 mL, Intravenous, PRN, Don Mon MD      Review of Systems:  Constitutional: no fever or chills  Eyes: no visual changes  ENT: no nasal congestion or sore throat  Respiratory: no cough or shortness of breath  Cardiovascular: no chest pain  Gastrointestinal: no nausea or vomiting, tolerating diet  Musculoskeletal: pain    OBJECTIVE:      Vital Signs (Most Recent):  Vitals:    11/13/24 1259   BP: (!) 161/85   Pulse: 73   Weight: 99.3 kg (218 lb 14.7 oz)   Height: 6' 1" (1.854 m)     Body mass index is 28.88 kg/m².      Physical Exam:  Constitutional: The patient appears well-developed and well-nourished. No distress.   Skin: No lesions appreciated  Head: Normocephalic and atraumatic.   Nose: Nose normal.   Ears: " No deformities seen  Eyes: Conjunctivae and EOM are normal.   Neck: No tracheal deviation present.   Cardiovascular: Normal rate and intact distal pulses.    Pulmonary/Chest: Effort normal. No respiratory distress.   Abdominal: There is no guarding.   Neurological: The patient is alert.   Psychiatric: The patient has a normal mood and affect.     Left Hand/Wrist Examination:    Observation/Inspection:  Swelling  Mild left 5th metacarpal    Deformity  none  Discoloration  none     Scars   none    Atrophy  none    HAND/WRIST EXAMINATION:  Finkelstein's Test   Neg  WHAT Test    Neg  Snuff box tenderness   Neg  Gee's Test    Neg  Hook of Hamate Tenderness  Neg  CMC grind    Neg  Circumduction test   Neg  TTP     none    Neurovascular Exam:  Digits WWP, brisk CR < 3s throughout  NVI motor/LTS to M/R/U nerves, radial pulse 2+  Tinel's Test - Carpal Tunnel  Neg  Tinel's Test - Cubital Tunnel  Neg  Phalen's Test    Neg  Median Nerve Compression Test Neg  AIN Intact (O-Trini), PIN Intact (Thumbs Up), Ulnar Intact (scissors)    ROM hand full, painless, able to make composite fist, no scissoring or deformity     ROM wrist full, painless    ROM elbow full, painless    Abdomen not guarded  Respirations nonlabored  Perfusion intact    Diagnostic Results:     Imaging - I independently viewed the patient's imaging as well as the radiology report.  Xrays of the patient's left hand was treat acute mildly comminuted fracture of the 5th metacarpal, stable alignment    ASSESSMENT/PLAN:      1. Closed displaced fracture of shaft of fifth metacarpal bone of left hand, initial encounter  Ambulatory referral/consult to Orthopedics    X-Ray Hand Complete Left        I made the decision to obtain old records of the patient including previous notes and imaging. If new imaging was ordered today of the extremity or extremities evaluated. I independently reviewed and interpreted the xrays as well as prior imaging. Reviewed imaging in detail with  patient.     We discussed at length different treatment options including conservative vs surgical management. These include anti-inflammatories, acetaminophen, rest, ice, heat, formal physical therapy including strengthening and stretching exercises, home exercise programs, injections, and finally surgical intervention.      Patient here for acute left hand pain after sustaining an injury 3 3 weeks ago.  He sustained a boxer's fracture, history sounds like he was had this in the past.  On exam he has no tenderness, he is able to make composite fist without any scissoring.  We discussed discontinuing splint and doing occupational hand therapy for range of motion, patient declined OT and will do home exercise program.  HEP provided.  He may continue over-the-counter medications as needed for pain.  Discussed nonweightbearing until about 8 weeks from the injury.  We will see him back in 4 weeks with repeat x-rays, if he is doing well he will cancel.    Follow up: 4 weeks  Xrays needed: left hand     All of the patient's questions were answered and the patient will contact us if they have any questions or concerns in the interim.

## 2024-11-13 NOTE — TELEPHONE ENCOUNTER
----- Message from Roma Anna PA-C sent at 11/13/2024  2:21 PM CST -----  Let patient know xrays look good stable, schedule 1 month f/u with repeat xrays

## 2024-11-26 ENCOUNTER — LAB VISIT (OUTPATIENT)
Dept: LAB | Facility: HOSPITAL | Age: 54
End: 2024-11-26
Attending: NURSE PRACTITIONER
Payer: MEDICAID

## 2024-11-26 DIAGNOSIS — E11.65 TYPE 2 DIABETES MELLITUS WITH HYPERGLYCEMIA, WITH LONG-TERM CURRENT USE OF INSULIN: ICD-10-CM

## 2024-11-26 DIAGNOSIS — Z79.4 TYPE 2 DIABETES MELLITUS WITH HYPERGLYCEMIA, WITH LONG-TERM CURRENT USE OF INSULIN: ICD-10-CM

## 2024-11-26 DIAGNOSIS — N18.30 DIABETES MELLITUS DUE TO UNDERLYING CONDITION WITH STAGE 3 CHRONIC KIDNEY DISEASE, WITHOUT LONG-TERM CURRENT USE OF INSULIN, UNSPECIFIED WHETHER STAGE 3A OR 3B CKD: ICD-10-CM

## 2024-11-26 DIAGNOSIS — E08.22 DIABETES MELLITUS DUE TO UNDERLYING CONDITION WITH STAGE 3 CHRONIC KIDNEY DISEASE, WITHOUT LONG-TERM CURRENT USE OF INSULIN, UNSPECIFIED WHETHER STAGE 3A OR 3B CKD: ICD-10-CM

## 2024-11-26 LAB
CHOLEST SERPL-MCNC: 162 MG/DL (ref 120–199)
CHOLEST/HDLC SERPL: 3.3 {RATIO} (ref 2–5)
ESTIMATED AVG GLUCOSE: 123 MG/DL (ref 68–131)
HBA1C MFR BLD: 5.9 % (ref 4–5.6)
HDLC SERPL-MCNC: 49 MG/DL (ref 40–75)
HDLC SERPL: 30.2 % (ref 20–50)
LDLC SERPL CALC-MCNC: 93.4 MG/DL (ref 63–159)
NONHDLC SERPL-MCNC: 113 MG/DL
TRIGL SERPL-MCNC: 98 MG/DL (ref 30–150)

## 2024-11-26 PROCEDURE — 80061 LIPID PANEL: CPT | Performed by: NURSE PRACTITIONER

## 2024-11-26 PROCEDURE — 83036 HEMOGLOBIN GLYCOSYLATED A1C: CPT | Performed by: NURSE PRACTITIONER

## 2024-11-26 PROCEDURE — 36415 COLL VENOUS BLD VENIPUNCTURE: CPT | Performed by: NURSE PRACTITIONER

## 2024-12-03 ENCOUNTER — OFFICE VISIT (OUTPATIENT)
Dept: ENDOCRINOLOGY | Facility: CLINIC | Age: 54
End: 2024-12-03
Payer: MEDICAID

## 2024-12-03 VITALS
SYSTOLIC BLOOD PRESSURE: 146 MMHG | BODY MASS INDEX: 28.76 KG/M2 | TEMPERATURE: 98 F | HEART RATE: 74 BPM | WEIGHT: 218 LBS | DIASTOLIC BLOOD PRESSURE: 82 MMHG

## 2024-12-03 DIAGNOSIS — E11.65 TYPE 2 DIABETES MELLITUS WITH HYPERGLYCEMIA, WITH LONG-TERM CURRENT USE OF INSULIN: Primary | ICD-10-CM

## 2024-12-03 DIAGNOSIS — I10 HYPERTENSION, UNSPECIFIED TYPE: ICD-10-CM

## 2024-12-03 DIAGNOSIS — N18.32 STAGE 3B CHRONIC KIDNEY DISEASE: ICD-10-CM

## 2024-12-03 DIAGNOSIS — Z79.4 TYPE 2 DIABETES MELLITUS WITH HYPERGLYCEMIA, WITH LONG-TERM CURRENT USE OF INSULIN: Primary | ICD-10-CM

## 2024-12-03 LAB — GLUCOSE SERPL-MCNC: 156 MG/DL (ref 70–110)

## 2024-12-03 PROCEDURE — 3044F HG A1C LEVEL LT 7.0%: CPT | Mod: CPTII,,, | Performed by: NURSE PRACTITIONER

## 2024-12-03 PROCEDURE — 3066F NEPHROPATHY DOC TX: CPT | Mod: CPTII,,, | Performed by: NURSE PRACTITIONER

## 2024-12-03 PROCEDURE — 99999 PR PBB SHADOW E&M-EST. PATIENT-LVL IV: CPT | Mod: PBBFAC,,, | Performed by: NURSE PRACTITIONER

## 2024-12-03 PROCEDURE — 3079F DIAST BP 80-89 MM HG: CPT | Mod: CPTII,,, | Performed by: NURSE PRACTITIONER

## 2024-12-03 PROCEDURE — 1160F RVW MEDS BY RX/DR IN RCRD: CPT | Mod: CPTII,,, | Performed by: NURSE PRACTITIONER

## 2024-12-03 PROCEDURE — 4010F ACE/ARB THERAPY RXD/TAKEN: CPT | Mod: CPTII,,, | Performed by: NURSE PRACTITIONER

## 2024-12-03 PROCEDURE — G2211 COMPLEX E/M VISIT ADD ON: HCPCS | Mod: S$PBB,,, | Performed by: NURSE PRACTITIONER

## 2024-12-03 PROCEDURE — 1159F MED LIST DOCD IN RCRD: CPT | Mod: CPTII,,, | Performed by: NURSE PRACTITIONER

## 2024-12-03 PROCEDURE — 3077F SYST BP >= 140 MM HG: CPT | Mod: CPTII,,, | Performed by: NURSE PRACTITIONER

## 2024-12-03 PROCEDURE — 99214 OFFICE O/P EST MOD 30 MIN: CPT | Mod: PBBFAC | Performed by: NURSE PRACTITIONER

## 2024-12-03 PROCEDURE — 99214 OFFICE O/P EST MOD 30 MIN: CPT | Mod: S$PBB,,, | Performed by: NURSE PRACTITIONER

## 2024-12-03 PROCEDURE — 3008F BODY MASS INDEX DOCD: CPT | Mod: CPTII,,, | Performed by: NURSE PRACTITIONER

## 2024-12-03 PROCEDURE — 82962 GLUCOSE BLOOD TEST: CPT | Mod: PBBFAC | Performed by: NURSE PRACTITIONER

## 2024-12-03 PROCEDURE — 99999PBSHW POCT GLUCOSE, HAND-HELD DEVICE: Mod: PBBFAC,,,

## 2024-12-03 PROCEDURE — 3060F POS MICROALBUMINURIA REV: CPT | Mod: CPTII,,, | Performed by: NURSE PRACTITIONER

## 2024-12-03 RX ORDER — GLIMEPIRIDE 2 MG/1
2 TABLET ORAL
Qty: 90 TABLET | Refills: 0 | Status: SHIPPED | OUTPATIENT
Start: 2024-12-03 | End: 2025-12-03

## 2024-12-03 RX ORDER — LINAGLIPTIN 5 MG/1
5 TABLET, FILM COATED ORAL DAILY
Qty: 90 TABLET | Refills: 1 | Status: SHIPPED | OUTPATIENT
Start: 2024-12-03

## 2024-12-03 NOTE — PROGRESS NOTES
"CC: This 54 y.o. Black or  male  is here for evaluation of  T2DM along with comorbidities indicated in the Visit Diagnosis section of this encounter.    HPI: Jarrod Mercado was diagnosed with T2DM in his early 50s. Metformin started at the time of diagnosis.   Extensive fam h/o diabetes: mother with ESRD, father, MGF     DM COMPLICATIONS: nephropathy      Prior visit 9/4/24  Pt has not been seen since initial eval almost a year ago.   Last A1c was 9.5% in Feb, down from 12.2% last Sept.   He has not been testing his BGs. Does not like needles.Requests refills for Tradjenta and glimepiride. He started Tradjenta a bit more than a month ago when his pharmacist told him to take it with the glimepiride.  He was afraid to start Tradjenta  because of potential s/e.   He reports diet is his biggest problem. "Slowed down" intake of sodas and fast food.   Plan    Improve diet.   Foods that are low in carbohydrates and high in protein and non-starchy vegetables. Avoid processed foods.  Avoid beverages with sugar.   Continue Tradjenta and glimepiride.   Encouraged pt to start testing glucoses - ideally 2x/day but at least 1x/day.   Return to clinic in 3 months with labs prior.       Interval hx  A1c is down from 6.4 to 5.9%.     Not checking BGs but feels like BG is up and down. Taking his meds consistently daily. Eats a amador egg sandwich with Sprite or diet coke an hour after AM meds.       LAST DIABETES EDUCATION: 6/2024       HOSPITALIZED FOR DIABETES OR RELATED COMPLICATIONS -  No.    SIGNIFICANT DIABETES MED HISTORY:   Metformin - diarrhea on once daily schedule     PRESCRIBED DIABETES MEDICATIONS:   Glimepiride 4 mg once daily   Tradjenta 5 mg once daily     Misses medication doses - no      SELF MONITORING BLOOD GLUCOSE: Monitors blood glucose at home - none. Does not like needles.    POCT glucose 156 - fasting, 1 hour after taking meds       HYPOGLYCEMIC EPISODES: denies diaphoresis or dizziness. " Sometimes gets a weak spell that passes after a minute of rest.       CURRENT DIET: last had Sprite and Gatorade a week ago. Drinks diet soda, water; can easily finish a gallon of milk in a week.   Eats 2-3 meals/day. Skips lunch often. Dinner is often late.     Admits that he's a finicky eater.  Has slacked down a loot on cold drinks and fast food.     Diet recall: dinner was 2 turkey sandwiches, chips, water. Lunch was 2 hot and spicy chicken sandwiches, fries, sweet tea.    CURRENT EXERCISE:     SOCIAL:  Lives alone       BP (!) 146/82   Pulse 74   Temp 98.2 °F (36.8 °C)   Wt 98.9 kg (218 lb)   BMI 28.76 kg/m²     ROS:   CONSTITUTIONAL: Appetite good, denies fatigue  NEURO: denies paresthesias to hands/feet         PHYSICAL EXAM:  GENERAL: Well developed, well nourished. No acute distress.   PSYCH: AAOx3, appropriate mood and affect, conversant, well-groomed. Judgement and insight good.   NEURO: Cranial nerves grossly intact. Speech clear.   CHEST: Respirations even and unlabored.   Foot exam:   Protective Sensation (w/ 10 gram monofilament):  Right: Intact  Left: Intact    Visual Inspection:  Skin Breakdown -  Neither    Pedal Pulses:   Right: Present  Left: Present    Posterior Tibialis Pulses:   Right:Present  Left: Present      Hemoglobin A1C   Date Value Ref Range Status   11/26/2024 5.9 (H) 4.0 - 5.6 % Final     Comment:     ADA Screening Guidelines:  5.7-6.4%  Consistent with prediabetes  >or=6.5%  Consistent with diabetes    High levels of fetal hemoglobin interfere with the HbA1C  assay. Heterozygous hemoglobin variants (HbS, HgC, etc)do  not significantly interfere with this assay.   However, presence of multiple variants may affect accuracy.     09/04/2024 6.4 (H) 4.0 - 5.6 % Final     Comment:     ADA Screening Guidelines:  5.7-6.4%  Consistent with prediabetes  >or=6.5%  Consistent with diabetes    High levels of fetal hemoglobin interfere with the HbA1C  assay. Heterozygous hemoglobin variants  "(HbS, HgC, etc)do  not significantly interfere with this assay.   However, presence of multiple variants may affect accuracy.     02/28/2024 9.5 (H) 4.0 - 5.6 % Final     Comment:     ADA Screening Guidelines:  5.7-6.4%  Consistent with prediabetes  >or=6.5%  Consistent with diabetes    High levels of fetal hemoglobin interfere with the HbA1C  assay. Heterozygous hemoglobin variants (HbS, HgC, etc)do  not significantly interfere with this assay.   However, presence of multiple variants may affect accuracy.         No results found for: "CPEPTIDE", "GLUTAMICACID", "ISLETCELLANT", "FRUCTOSAMINE"     Lab Results   Component Value Date    CHOL 162 11/26/2024    CHOL 241 (H) 09/26/2023    CHOL 173 08/26/2022     Lab Results   Component Value Date    HDL 49 11/26/2024    HDL 59 09/26/2023    HDL 53 08/26/2022     Lab Results   Component Value Date    LDLCALC 93.4 11/26/2024    LDLCALC 147 (H) 09/26/2023    LDLCALC 86.4 08/26/2022     Lab Results   Component Value Date    TRIG 98 11/26/2024    TRIG 210 (H) 09/26/2023    TRIG 168 (H) 08/26/2022     Lab Results   Component Value Date    CHOLHDL 30.2 11/26/2024    CHOLHDL 4.1 09/26/2023    CHOLHDL 30.6 08/26/2022           Component Value Date/Time     (L) 02/23/2024 1627    K 3.7 02/23/2024 1627     02/23/2024 1627    CO2 23 02/23/2024 1627    BUN 15 02/23/2024 1627    CREATININE 1.8 (H) 02/23/2024 1627     (H) 02/23/2024 1627    CALCIUM 9.4 02/23/2024 1627    ALKPHOS 74 02/23/2024 1627    AST 17 02/23/2024 1627    ALT 19 02/23/2024 1627    BILITOT 0.7 02/23/2024 1627    EGFRNORACEVR 44 (A) 02/23/2024 1627    EGFRNORACEVR 48 (L) 09/26/2023 1431         Lab Results   Component Value Date    LABMICR 175.0 02/28/2024    CREATRANDUR 207.0 02/28/2024    MICALBCREAT 84.5 (H) 02/28/2024             ASSESSMENT and PLAN:    A1C GOAL: < 7 %     1. Type 2 diabetes mellitus with hyperglycemia, with long-term current use of insulin  A1c of 5.9% raises suspicion of " hypoglycemia in the setting of sulfonylurea. Unfortunately, I am unable to evaluate glucose trends d/t lack of testing. Will reduce glimepiride d/t low A1c but emphasized importance of monitoring BGs consistently and reviewed complications of uncontrolled glucoses.     Reduce glimepiride to 2 mg once daily before 1st meal of the day.   Recommend testing glucose at least 1x/day. Pt declines personal CGM d/t cost.   Continue Tradjenta.   Return to clinic in 3 months with labs and  Continuous Glucose Monitor (CGM) study.     POCT Glucose, Hand-Held Device    Hemoglobin A1C    Creatinine, Serum    GLUCOSE MONITORING CONTINUOUS MIN 72 HOURS    linaGLIPtin (TRADJENTA) 5 mg Tab tablet      2. Hypertension, unspecified type  BP still elevated upon repeat check - 150/90 to 146/82. Keep appt with PCP.       3. Stage 3b chronic kidney disease  Creatinine, Serum                 Orders Placed This Encounter   Procedures    Hemoglobin A1C     Standing Status:   Future     Standing Expiration Date:   2/1/2026     Order Specific Question:   Send normal result to authorizing provider's In Basket if patient is active on MyChart:     Answer:   Yes    Creatinine, Serum     Standing Status:   Future     Standing Expiration Date:   2/1/2026    GLUCOSE MONITORING CONTINUOUS MIN 72 HOURS     Standing Status:   Future     Standing Expiration Date:   12/4/2025    POCT Glucose, Hand-Held Device        Follow up in about 3 months (around 3/3/2025).

## 2024-12-03 NOTE — PATIENT INSTRUCTIONS
A1C goal: <7%  Fasting/premeal blood glucose goal:   2 hour post-meal blood glucose goal: less than 180    Reduce glimepiride to 2 mg once daily before 1st meal of the day.   Recommend testing glucose at least 1x/day.   Continue Tradjenta.   Return to clinic in 3 months with labs and  Continuous Glucose Monitor (CGM) study.

## 2024-12-13 ENCOUNTER — OFFICE VISIT (OUTPATIENT)
Dept: ORTHOPEDICS | Facility: CLINIC | Age: 54
End: 2024-12-13
Payer: MEDICAID

## 2024-12-13 VITALS
DIASTOLIC BLOOD PRESSURE: 90 MMHG | HEART RATE: 86 BPM | SYSTOLIC BLOOD PRESSURE: 152 MMHG | WEIGHT: 218.06 LBS | HEIGHT: 73 IN | BODY MASS INDEX: 28.9 KG/M2

## 2024-12-13 DIAGNOSIS — M25.562 ACUTE PAIN OF LEFT KNEE: Primary | ICD-10-CM

## 2024-12-13 DIAGNOSIS — M25.561 ACUTE PAIN OF RIGHT KNEE: ICD-10-CM

## 2024-12-13 DIAGNOSIS — M54.42 ACUTE BACK PAIN WITH SCIATICA, LEFT: ICD-10-CM

## 2024-12-13 PROCEDURE — 99999 PR PBB SHADOW E&M-EST. PATIENT-LVL IV: CPT | Mod: PBBFAC,,,

## 2024-12-13 PROCEDURE — 99214 OFFICE O/P EST MOD 30 MIN: CPT | Mod: PBBFAC,PN

## 2024-12-13 RX ORDER — TRIAMCINOLONE ACETONIDE 40 MG/ML
40 INJECTION, SUSPENSION INTRA-ARTICULAR; INTRAMUSCULAR
Status: COMPLETED | OUTPATIENT
Start: 2024-12-13 | End: 2024-12-13

## 2024-12-13 RX ORDER — CYCLOBENZAPRINE HCL 5 MG
5 TABLET ORAL 3 TIMES DAILY PRN
Qty: 30 TABLET | Refills: 1 | Status: SHIPPED | OUTPATIENT
Start: 2024-12-13 | End: 2024-12-23

## 2024-12-13 RX ORDER — TRIAMCINOLONE ACETONIDE 40 MG/ML
40 INJECTION, SUSPENSION INTRA-ARTICULAR; INTRAMUSCULAR
Status: DISCONTINUED | OUTPATIENT
Start: 2024-12-13 | End: 2024-12-13 | Stop reason: HOSPADM

## 2024-12-13 RX ORDER — METHYLPREDNISOLONE 4 MG/1
TABLET ORAL
Qty: 21 EACH | Refills: 0 | Status: SHIPPED | OUTPATIENT
Start: 2024-12-13

## 2024-12-13 RX ADMIN — TRIAMCINOLONE ACETONIDE 40 MG: 40 INJECTION, SUSPENSION INTRA-ARTICULAR; INTRAMUSCULAR at 01:12

## 2024-12-13 NOTE — PROCEDURES
Large Joint Aspiration/Injection: L knee    Date/Time: 12/13/2024 1:00 PM    Performed by: Roma Anna PA-C  Authorized by: Roma Anna PA-C    Consent Done?:  Yes (Verbal)  Indications:  Pain  Site marked: the procedure site was marked    Timeout: prior to procedure the correct patient, procedure, and site was verified    Prep: patient was prepped and draped in usual sterile fashion      Local anesthesia used?: Yes    Local anesthetic:  Bupivacaine 0.25% without epinephrine and topical anesthetic  Anesthetic total (ml):  4      Details:  Needle Size:  21 G  Ultrasonic Guidance for needle placement?: No    Approach:  Anterolateral  Location:  Knee  Site:  L knee  Medications:  40 mg triamcinolone acetonide 40 mg/mL  Patient tolerance:  Patient tolerated the procedure well with no immediate complications

## 2024-12-13 NOTE — PROGRESS NOTES
Patient ID: Jarrod Mercado is a 54 y.o. male    CC: left knee pain    History of Present Illness:    Jarrod Mercado presents to clinic for left knee pain.  Appointment was initially for follow up of left boxer's fracture however patient states his hand is okay.  He would like to be seen for his knee/hip and back pain today today.. Patient denies known SHANTEL. The pain started 1 weeks ago and is becoming progressively worse.  Pain is located over (points to) anterior, medial, and lateral knee.  Also states initial onset of left low back pain with posterior radiation down to his calf.  He has not tried anything to help his pain.    Autoimmune conditions:No    DEXA Scan: N/A    Occupation: landscape    Ambulating:  Unassisted  Diabetic: + 5.9, previously poorly controlled  Smoking:  No  Hx of DVT/PE:  No    PAST MEDICAL HISTORY:   Past Medical History:   Diagnosis Date    Diabetes mellitus     Diabetes mellitus type I     Hyperlipidemia associated with type 2 diabetes mellitus 9/27/2023    Hypertension      PAST SURGICAL HISTORY:   Past Surgical History:   Procedure Laterality Date    COLONOSCOPY  01/28/2022    COLONOSCOPY N/A 01/28/2022    Procedure: COLONOSCOPY;  Surgeon: Don Mon MD;  Location: [facility];  Service: Endoscopy;  Laterality: N/A;    INCISION AND DRAINAGE OF HAND Right 3/4/2024    Procedure: INCISION AND DRAINAGE, HAND;  Surgeon: Wilfrido Prado MD;  Location: [facility] OR;  Service: Orthopedics;  Laterality: Right;  possible I&D depending on how incision looks morning of, cultures    INCISION AND DRAINAGE OF WOUND  03/04/2024    Middle finger and thumb    OPEN REDUCTION AND INTERNAL FIXATION (ORIF) OF INJURY OF FINGER  03/04/2024    Middle finger    OPEN REDUCTION AND INTERNAL FIXATION (ORIF) OF INJURY OF FINGER Right 3/4/2024    Procedure: ORIF, FINGER;  Surgeon: Wilfrido Prado MD;  Location: [facility] OR;  Service: Orthopedics;  Laterality: Right;  right middle finger, c-arm, accumed    right knee        FAMILY HISTORY:   Family History   Problem Relation Name Age of Onset    Kidney disease Mother      Hypertension Father      Diabetes type II Father       SOCIAL HISTORY:   Social History     Occupational History    Not on file   Tobacco Use    Smoking status: Never    Smokeless tobacco: Never   Substance and Sexual Activity    Alcohol use: Yes     Comment: occasionally    Drug use: No    Sexual activity: Yes     Partners: Female        MEDICATIONS:   Current Outpatient Medications:     acetaminophen (TYLENOL) 500 MG tablet, Take 1 tablet (500 mg total) by mouth every 6 (six) hours as needed for Pain., Disp: 30 tablet, Rfl: 0    acetaminophen (TYLENOL) 500 MG tablet, Take 1 tablet (500 mg total) by mouth every 6 (six) hours as needed., Disp: 20 tablet, Rfl: 0    blood sugar diagnostic Strp, To check BG 2 times daily, to use with insurance preferred meter, Disp: 200 each, Rfl: 3    cetirizine (ZYRTEC) 10 MG tablet, Take 1 tablet (10 mg total) by mouth once daily., Disp: 30 tablet, Rfl: 0    diclofenac sodium (VOLTAREN) 1 % Gel, Apply 2 g topically 3 (three) times daily., Disp: 50 g, Rfl: 0    diphenhydrAMINE (BENADRYL) 25 mg capsule, Take 1 capsule (25 mg total) by mouth every 6 (six) hours as needed for Itching or Allergies., Disp: 20 capsule, Rfl: 0    famotidine (PEPCID) 20 MG tablet, Take 1 tablet (20 mg total) by mouth 2 (two) times daily., Disp: 20 tablet, Rfl: 0    gabapentin (NEURONTIN) 300 MG capsule, TAKE 1 CAPSULE BY MOUTH IN THE EVENING, Disp: 90 capsule, Rfl: 0    glimepiride (AMARYL) 2 MG tablet, Take 1 tablet (2 mg total) by mouth before breakfast., Disp: 90 tablet, Rfl: 0    ibuprofen (ADVIL,MOTRIN) 600 MG tablet, Take 1 tablet (600 mg total) by mouth every 6 (six) hours as needed for Pain., Disp: 20 tablet, Rfl: 0    lancets Misc, To check BG 2 times daily, to use with insurance preferred meter, Disp: 200 each, Rfl: 3    linaGLIPtin (TRADJENTA) 5 mg Tab tablet, Take 1 tablet (5 mg total) by mouth  once daily., Disp: 90 tablet, Rfl: 1    losartan (COZAAR) 50 MG tablet, Take 1 tablet by mouth once daily, Disp: 30 tablet, Rfl: 0    ondansetron (ZOFRAN-ODT) 4 MG TbDL, Take 1 tablet (4 mg total) by mouth 2 (two) times daily., Disp: 20 tablet, Rfl: 0    oxyCODONE-acetaminophen (PERCOCET) 5-325 mg per tablet, Take 1 tablet by mouth every 4 (four) hours as needed for Pain., Disp: 28 each, Rfl: 0    tadalafiL (CIALIS) 5 MG tablet, Take 5 mg by mouth., Disp: , Rfl:     blood-glucose meter kit, To check BG 2 times daily, to use with insurance preferred meter, Disp: 1 each, Rfl: 0    cyclobenzaprine (FLEXERIL) 5 MG tablet, Take 1 tablet (5 mg total) by mouth 3 (three) times daily as needed., Disp: 30 tablet, Rfl: 1    EPINEPHrine (EPIPEN 2-RAMONE) 0.3 mg/0.3 mL AtIn, Inject 0.3 mLs (0.3 mg total) into the muscle once. for 1 dose, Disp: 0.3 mL, Rfl: 0    methylPREDNISolone (MEDROL DOSEPACK) 4 mg tablet, use as directed, Disp: 21 each, Rfl: 0  No current facility-administered medications for this visit.    Facility-Administered Medications Ordered in Other Visits:     sodium chloride 0.9% flush 10 mL, 10 mL, Intravenous, PRN, Don Mon MD  ALLERGIES: Review of patient's allergies indicates:  No Known Allergies      Physical Exam     Vitals:    12/13/24 1316   BP: (!) 152/90   Pulse: 86     Alert and oriented to person, place and time. No acute distress. Well-groomed, not ill appearing. Pupils round and reactive, normal respiratory effort, no audible wheezing.     GENERAL:  A well-developed, well-nourished 54 y.o. male who is alert and       oriented in no acute distress.      Gait: He  walks with a normal gait.                   EXTREMITIES:  Examination of lower extremities reveals there is no visible mass or deformity.    Left knee:  ROM 0-130    Ligamentously stable to varus/valgus stress.    Anterior and posterior drawers negative.    No pain over pes bursa.    No warmth    No erythema     Effusion No    Medial  joint line tenderness    Positive Patellofemoral grind/crepitus     Positive SI joint and piriformis tenderness.  Positive straight leg raise.  Negative logroll and TOYA/FADIR        The skin over both lower extremities is normal and unremarkable.  He has a  painless range of motion of the hips and ankles bilaterally.   Sensation is intact in both lower extremities.    There are no motor deficits in the lower extremities bilaterally.   Pedal pulses are palpable distally bilaterally.    He has no calf tenderness to palpation nor edema.    Imaging:     Bilateral knee X-rays ordered/reviewed by me showing no evidence of fracture or dislocation. There is no obvious malalignment. No evidence of masses, lesions or foreign bodies.  Mild tricompartmental DJD with associated superior patella bone spur    Assessment & Plan    Acute pain of left knee  -     X-ray Knee Ortho Bilateral with Flexion; Future; Expected date: 12/13/2024  -     methylPREDNISolone (MEDROL DOSEPACK) 4 mg tablet; use as directed  Dispense: 21 each; Refill: 0  -     cyclobenzaprine (FLEXERIL) 5 MG tablet; Take 1 tablet (5 mg total) by mouth 3 (three) times daily as needed.  Dispense: 30 tablet; Refill: 1  -     triamcinolone acetonide injection 40 mg  -     Large Joint Aspiration/Injection: L knee    Acute back pain with sciatica, left  -     methylPREDNISolone (MEDROL DOSEPACK) 4 mg tablet; use as directed  Dispense: 21 each; Refill: 0  -     cyclobenzaprine (FLEXERIL) 5 MG tablet; Take 1 tablet (5 mg total) by mouth 3 (three) times daily as needed.  Dispense: 30 tablet; Refill: 1    Acute pain of right knee         I made the decision to obtain old records of the patient including previous notes and imaging. New imaging was ordered today of the extremity or extremities evaluated. I independently reviewed and interpreted the radiographs and/or MRIs/CT scan today as well as prior imaging.    We discussed at length different treatment options including  conservative vs surgical management. These include anti-inflammatories, acetaminophen, rest, ice, heat, formal physical therapy including strengthening and stretching exercises, home exercise programs, injections, dry needling, and finally surgical intervention.      Patient here for left knee and left-sided low back pain.  Discussed we could trial left knee CSI today.  However low back pain is likely related to sciatica.  We discussed treatment options in detail for this.  Discussed any further intervention would need to be with back and spine provider.    Left knee CSI given, post-injection instructions reviewed    Medrol Dosepak for acute sciatica flare-up, take as instructed    Flexeril Rx, may cause drowsiness take at night p.r.n.    Patient was advised to monitor his blood sugars closely over the next several days. The steroid may cause a rise in them. If his glucose levels rise to a point he is uncomfortable or he is unable to control them he is to contact his PCP or go immediately to the emergency department.       Follow up:  If symptoms worsen or fail to improve, consider left knee MRI  X-rays next visit:  None    All questions were answered and patient is agreeable to the above plan.

## 2024-12-15 ENCOUNTER — HOSPITAL ENCOUNTER (EMERGENCY)
Facility: HOSPITAL | Age: 54
Discharge: HOME | End: 2024-12-15
Attending: EMERGENCY MEDICINE
Payer: MEDICAID

## 2024-12-15 VITALS
RESPIRATION RATE: 20 BRPM | SYSTOLIC BLOOD PRESSURE: 161 MMHG | OXYGEN SATURATION: 100 % | WEIGHT: 220 LBS | DIASTOLIC BLOOD PRESSURE: 78 MMHG | TEMPERATURE: 98 F | HEART RATE: 74 BPM | BODY MASS INDEX: 29.03 KG/M2

## 2024-12-15 DIAGNOSIS — M54.42 ACUTE LEFT-SIDED LOW BACK PAIN WITH LEFT-SIDED SCIATICA: Primary | ICD-10-CM

## 2024-12-15 PROCEDURE — 96372 THER/PROPH/DIAG INJ SC/IM: CPT | Performed by: EMERGENCY MEDICINE

## 2024-12-15 PROCEDURE — 63600175 PHARM REV CODE 636 W HCPCS: Mod: ER | Performed by: EMERGENCY MEDICINE

## 2024-12-15 PROCEDURE — 99284 EMERGENCY DEPT VISIT MOD MDM: CPT | Mod: 25,ER

## 2024-12-15 PROCEDURE — 96372 THER/PROPH/DIAG INJ SC/IM: CPT | Mod: ER

## 2024-12-15 RX ORDER — KETOROLAC TROMETHAMINE 30 MG/ML
30 INJECTION, SOLUTION INTRAMUSCULAR; INTRAVENOUS
Status: COMPLETED | OUTPATIENT
Start: 2024-12-15 | End: 2024-12-15

## 2024-12-15 RX ORDER — ORPHENADRINE CITRATE 30 MG/ML
60 INJECTION INTRAMUSCULAR; INTRAVENOUS
Status: COMPLETED | OUTPATIENT
Start: 2024-12-15 | End: 2024-12-15

## 2024-12-15 RX ADMIN — ORPHENADRINE CITRATE 60 MG: 60 INJECTION INTRAMUSCULAR; INTRAVENOUS at 11:12

## 2024-12-15 RX ADMIN — KETOROLAC TROMETHAMINE 30 MG: 30 INJECTION, SOLUTION INTRAMUSCULAR; INTRAVENOUS at 11:12

## 2024-12-15 NOTE — DISCHARGE INSTRUCTIONS
Read attached handouts. Take medications as directed - flexeril and steroid pack. You may also take motrin over the counter. Your blood sugar will be high while on steroids so drink more water and watch your sugars and carbs. Follow up with your Orthopedic doctor if you are not better after completing the steroids.

## 2024-12-15 NOTE — ED PROVIDER NOTES
Encounter Date: 12/15/2024    SCRIBE #1 NOTE: I, Roman Zuniga Do, am scribing for, and in the presence of,  Elina Aponte MD. I have scribed the following portions of the note - Other sections scribed: HPI, ROS, PE, MDM.       History     Chief Complaint   Patient presents with    Back Pain     Left lower back pain radiating down left hip & leg for the past week, worse since Friday     Jarrod Mercado is a 54 y.o. male, with a pertinent PMHx of diabetes, HLD, and HTN, who presents to the ED with worsening left lower back pain that radiates down to his left calf that began about 1 week ago. He notes a burning pain to his left calf. 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 Patient reports attempted treatment with hot water baths, ibuprofen. Per chart review 12/13/2024, patient was seen by his orthopedist. Left knee CSI given. Patient was discharged with cyclobenzaprine and methylprednisolone. Patient has not taken these medicines. He reports adherence with Glimepiride and Losartan. No other exacerbating or alleviating factors. Patient denies any associated chest pain, SOB, fever.     The history is provided by the patient. No  was used.     Review of patient's allergies indicates:  No Known Allergies  Past Medical History:   Diagnosis Date    Diabetes mellitus     Diabetes mellitus type I     Hyperlipidemia associated with type 2 diabetes mellitus 9/27/2023    Hypertension      Past Surgical History:   Procedure Laterality Date    COLONOSCOPY  01/28/2022    COLONOSCOPY N/A 01/28/2022    Procedure: COLONOSCOPY;  Surgeon: Don Mon MD;  Location: [facility] ENDO;  Service: Endoscopy;  Laterality: N/A;    INCISION AND DRAINAGE OF HAND Right 3/4/2024    Procedure: INCISION AND DRAINAGE, HAND;  Surgeon: Wilfrido Prado MD;  Location: [facility] OR;  Service: Orthopedics;  Laterality: Right;  possible I&D depending on how incision looks morning of, cultures    INCISION AND  DRAINAGE OF WOUND  03/04/2024    Middle finger and thumb    OPEN REDUCTION AND INTERNAL FIXATION (ORIF) OF INJURY OF FINGER  03/04/2024    Middle finger    OPEN REDUCTION AND INTERNAL FIXATION (ORIF) OF INJURY OF FINGER Right 3/4/2024    Procedure: ORIF, FINGER;  Surgeon: Wilfrido Prado MD;  Location: [facility] OR;  Service: Orthopedics;  Laterality: Right;  right middle finger, c-arm, accumed    right knee       Family History   Problem Relation Name Age of Onset    Kidney disease Mother      Hypertension Father      Diabetes type II Father       Social History     Tobacco Use    Smoking status: Never    Smokeless tobacco: Never   Substance Use Topics    Alcohol use: Yes     Comment: occasionally    Drug use: No     Review of Systems   Constitutional:  Negative for activity change, appetite change, chills and fever.   HENT:  Negative for congestion, rhinorrhea, sneezing and sore throat.    Respiratory:  Negative for cough, chest tightness, shortness of breath and wheezing.    Cardiovascular:  Negative for chest pain and palpitations.   Gastrointestinal:  Negative for abdominal pain, diarrhea, nausea and vomiting.   Musculoskeletal:  Positive for back pain and myalgias.   Skin:  Negative for rash.   Neurological:  Negative for dizziness, weakness, light-headedness, numbness and headaches.   All other systems reviewed and are negative.      Physical Exam     Initial Vitals [12/15/24 1055]   BP Pulse Resp Temp SpO2   (!) 184/88 74 20 98.3 °F (36.8 °C) 100 %      MAP       --         Physical Exam    Nursing note and vitals reviewed.  Constitutional: He appears well-developed and well-nourished.   HENT:   Head: Normocephalic and atraumatic.   Right Ear: External ear normal.   Left Ear: External ear normal.   Neck: Carotid bruit is not present.   Normal range of motion.  Cardiovascular:  Normal rate, regular rhythm, normal heart sounds and intact distal pulses.     Exam reveals no gallop and no friction rub.       No  murmur heard.  Pulmonary/Chest: Breath sounds normal. No respiratory distress. He has no wheezes. He has no rhonchi. He has no rales.   Abdominal: Abdomen is soft. Bowel sounds are normal. He exhibits no distension. There is no abdominal tenderness. There is no rebound and no guarding.   Musculoskeletal:      Cervical back: Normal range of motion.      Comments: Left SI joint tenderness. Limited forward flexion to the lower back.      Neurological: He is alert and oriented to person, place, and time. He has normal strength. No sensory deficit. Coordination and gait normal. GCS score is 15. GCS eye subscore is 4. GCS verbal subscore is 5. GCS motor subscore is 6.   Psychiatric: He has a normal mood and affect.         ED Course   Procedures  Labs Reviewed - No data to display       Imaging Results    None          Medications   ketorolac injection 30 mg (30 mg Intramuscular Given 12/15/24 1128)   orphenadrine injection 60 mg (60 mg Intramuscular Given 12/15/24 1131)     Medical Decision Making  Jarrod Mercado is a 54 y.o. male, with a pertinent PMHx of diabetes, HLD, and HTN, who presents to the ED with worsening left lower back pain that radiates down to his left calf that began about 1 week ago. He notes a burning pain to his left calf. 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 Patient reports attempted treatment with hot water baths, ibuprofen. Per chart review 12/13/2024, patient was seen by his orthopedist. Left knee CSI given. Patient was discharged with cyclobenzaprine and methylprednisolone. Patient has not taken these medicines. He reports adherence with Glimepiride and Losartan. No other exacerbating or alleviating factors. Patient denies any associated chest pain, SOB, fever.     On exam - Left SI joint tenderness. Limited forward flexion to the lower back. Normal strength and sensation in BLEs. No cranial nerve deficits. No abnormal coordination or gait.     In shared  decision making with pt, I will treat pain with orphenadrine and ketorolac injections in the ER and advised pt to start medrol dose pack and muscle relaxer.    Amount and/or Complexity of Data Reviewed  External Data Reviewed: notes.     Details: See HPI.    Risk  Prescription drug management.            Scribe Attestation:   Scribe #1: I performed the above scribed service and the documentation accurately describes the services I performed. I attest to the accuracy of the note.                               Clinical Impression:  Final diagnoses:  [M54.42] Acute left-sided low back pain with left-sided sciatica (Primary)       I, Dr. Elina Aponte, personally performed the services described in this documentation.   All medical record entries made by the scribe were at my direction and in my presence.   I have reviewed the chart and agree that the record is accurate and complete.   Elina Aponte MD.  11:19 AM 12/15/2024      ED Disposition Condition    Discharge Stable          ED Prescriptions    None       Follow-up Information    None          Elina Aponte MD  12/15/24 [id number]

## 2025-01-06 ENCOUNTER — OFFICE VISIT (OUTPATIENT)
Dept: FAMILY MEDICINE | Facility: CLINIC | Age: 55
End: 2025-01-06
Payer: MEDICAID

## 2025-01-06 ENCOUNTER — CLINICAL SUPPORT (OUTPATIENT)
Dept: FAMILY MEDICINE | Facility: CLINIC | Age: 55
End: 2025-01-06
Attending: INTERNAL MEDICINE
Payer: MEDICAID

## 2025-01-06 VITALS
TEMPERATURE: 98 F | DIASTOLIC BLOOD PRESSURE: 88 MMHG | BODY MASS INDEX: 28.4 KG/M2 | OXYGEN SATURATION: 98 % | SYSTOLIC BLOOD PRESSURE: 130 MMHG | HEART RATE: 87 BPM | WEIGHT: 214.31 LBS | HEIGHT: 73 IN

## 2025-01-06 DIAGNOSIS — I10 PRIMARY HYPERTENSION: ICD-10-CM

## 2025-01-06 DIAGNOSIS — R79.89 ABNORMAL TSH: Primary | ICD-10-CM

## 2025-01-06 DIAGNOSIS — Z00.00 ANNUAL PHYSICAL EXAM: Primary | ICD-10-CM

## 2025-01-06 DIAGNOSIS — E08.00 DIABETES MELLITUS DUE TO UNDERLYING CONDITION WITH HYPEROSMOLARITY WITHOUT COMA, WITHOUT LONG-TERM CURRENT USE OF INSULIN: ICD-10-CM

## 2025-01-06 DIAGNOSIS — M54.42 CHRONIC MIDLINE LOW BACK PAIN WITH LEFT-SIDED SCIATICA: ICD-10-CM

## 2025-01-06 DIAGNOSIS — G89.29 CHRONIC MIDLINE LOW BACK PAIN WITH LEFT-SIDED SCIATICA: ICD-10-CM

## 2025-01-06 PROCEDURE — 96372 THER/PROPH/DIAG INJ SC/IM: CPT | Mod: PBBFAC,PO

## 2025-01-06 PROCEDURE — 2025F 7 FLD RTA PHOTO W/O RTNOPTHY: CPT | Mod: CPTII,,, | Performed by: OPTOMETRIST

## 2025-01-06 PROCEDURE — 1159F MED LIST DOCD IN RCRD: CPT | Mod: CPTII,,, | Performed by: INTERNAL MEDICINE

## 2025-01-06 PROCEDURE — 92228 IMG RTA DETC/MNTR DS PHY/QHP: CPT | Mod: 26,S$PBB,, | Performed by: OPTOMETRIST

## 2025-01-06 PROCEDURE — 99999 PR PBB SHADOW E&M-EST. PATIENT-LVL V: CPT | Mod: PBBFAC,,, | Performed by: INTERNAL MEDICINE

## 2025-01-06 PROCEDURE — 4010F ACE/ARB THERAPY RXD/TAKEN: CPT | Mod: CPTII,,, | Performed by: INTERNAL MEDICINE

## 2025-01-06 PROCEDURE — 3008F BODY MASS INDEX DOCD: CPT | Mod: CPTII,,, | Performed by: INTERNAL MEDICINE

## 2025-01-06 PROCEDURE — 3075F SYST BP GE 130 - 139MM HG: CPT | Mod: CPTII,,, | Performed by: INTERNAL MEDICINE

## 2025-01-06 PROCEDURE — 99215 OFFICE O/P EST HI 40 MIN: CPT | Mod: PBBFAC,PO | Performed by: INTERNAL MEDICINE

## 2025-01-06 PROCEDURE — 92228 IMG RTA DETC/MNTR DS PHY/QHP: CPT | Mod: PBBFAC,PO

## 2025-01-06 PROCEDURE — 99999PBSHW PR PBB SHADOW TECHNICAL ONLY FILED TO HB: Mod: PBBFAC,,,

## 2025-01-06 PROCEDURE — 99386 PREV VISIT NEW AGE 40-64: CPT | Mod: S$PBB,,, | Performed by: INTERNAL MEDICINE

## 2025-01-06 PROCEDURE — 3079F DIAST BP 80-89 MM HG: CPT | Mod: CPTII,,, | Performed by: INTERNAL MEDICINE

## 2025-01-06 RX ORDER — METHYLPREDNISOLONE ACETATE 40 MG/ML
40 INJECTION, SUSPENSION INTRA-ARTICULAR; INTRALESIONAL; INTRAMUSCULAR; SOFT TISSUE
Status: COMPLETED | OUTPATIENT
Start: 2025-01-06 | End: 2025-01-06

## 2025-01-06 RX ORDER — KETOROLAC TROMETHAMINE 30 MG/ML
30 INJECTION, SOLUTION INTRAMUSCULAR; INTRAVENOUS
Status: COMPLETED | OUTPATIENT
Start: 2025-01-06 | End: 2025-01-06

## 2025-01-06 RX ORDER — LOSARTAN POTASSIUM 50 MG/1
50 TABLET ORAL DAILY
Qty: 90 TABLET | Refills: 3 | Status: SHIPPED | OUTPATIENT
Start: 2025-01-06

## 2025-01-06 RX ADMIN — METHYLPREDNISOLONE ACETATE 40 MG: 40 INJECTION, SUSPENSION INTRA-ARTICULAR; INTRALESIONAL; INTRAMUSCULAR; SOFT TISSUE at 10:01

## 2025-01-06 RX ADMIN — KETOROLAC TROMETHAMINE 30 MG: 30 INJECTION, SOLUTION INTRAMUSCULAR; INTRAVENOUS at 10:01

## 2025-01-06 NOTE — PROGRESS NOTES
Jarrod Mercado is a 54 y.o. male here for a diabetic eye screening with non-dilated fundus photos per .    Patient cooperative?: Yes  Small pupils?: No    For exam results, see Encounter Report.

## 2025-01-06 NOTE — PROGRESS NOTES
SUBJECTIVE     Chief Complaint   Patient presents with    Follow-up       HPI  Jarrod Mercado is a 54 y.o. male with multiple medical diagnoses as listed in the medical history and problem list that presents for annual exam. Pt has been doing well since his last visit. He has a good appetite and eats well. He does not exercise. He sleeps for ~2 hours nightly 2/2 back pain. Pt does not take OTC supplements. He does not have any current stressors. Pt is not UTD on age appropriate CA screening.    PAST MEDICAL HISTORY:  Past Medical History:   Diagnosis Date    Diabetes mellitus     Diabetes mellitus type I     Hyperlipidemia associated with type 2 diabetes mellitus 9/27/2023    Hypertension        PAST SURGICAL HISTORY:  Past Surgical History:   Procedure Laterality Date    COLONOSCOPY  01/28/2022    COLONOSCOPY N/A 01/28/2022    Procedure: COLONOSCOPY;  Surgeon: Don Mon MD;  Location: [facility];  Service: Endoscopy;  Laterality: N/A;    INCISION AND DRAINAGE OF HAND Right 3/4/2024    Procedure: INCISION AND DRAINAGE, HAND;  Surgeon: Wilfrido Prado MD;  Location: [facility] [place] OR;  Service: Orthopedics;  Laterality: Right;  possible I&D depending on how incision looks morning of, cultures    INCISION AND DRAINAGE OF WOUND  03/04/2024    Middle finger and thumb    OPEN REDUCTION AND INTERNAL FIXATION (ORIF) OF INJURY OF FINGER  03/04/2024    Middle finger    OPEN REDUCTION AND INTERNAL FIXATION (ORIF) OF INJURY OF FINGER Right 3/4/2024    Procedure: ORIF, FINGER;  Surgeon: Wilfrido Prado MD;  Location: [facility];  Service: Orthopedics;  Laterality: Right;  right middle finger, c-arm, accumed    right knee         SOCIAL HISTORY:  Social History     Socioeconomic History    Marital status: Single   Tobacco Use    Smoking status: Never    Smokeless tobacco: Never   Substance and Sexual Activity    Alcohol use: Yes     Comment: occasionally    Drug use: No    Sexual activity: Yes     Partners: Female     Social  Drivers of Health     Financial Resource Strain: Low Risk  (12/28/2023)    Overall Financial Resource Strain (CARDIA)     Difficulty of Paying Living Expenses: Not hard at all   Food Insecurity: No Food Insecurity (12/28/2023)    Hunger Vital Sign     Worried About Running Out of Food in the Last Year: Never true     Ran Out of Food in the Last Year: Never true   Transportation Needs: No Transportation Needs (12/28/2023)    PRAPARE - Transportation     Lack of Transportation (Medical): No     Lack of Transportation (Non-Medical): No   Physical Activity: Unknown (12/28/2023)    Exercise Vital Sign     Days of Exercise per Week: 3 days   Stress: Stress Concern Present (12/28/2023)    [employer] - Occupational Stress Questionnaire     Feeling of Stress : To some extent   Housing Stability: Low Risk  (12/28/2023)    Housing Stability Vital Sign     Unable to Pay for Housing in the Last Year: No     Number of Places Lived in the Last Year: 1     Unstable Housing in the Last Year: No       FAMILY HISTORY:  Family History   Problem Relation Name Age of Onset    Kidney disease Mother      Hypertension Father      Diabetes type II Father         ALLERGIES AND MEDICATIONS: updated and reviewed.  Review of patient's allergies indicates:  No Known Allergies  Current Outpatient Medications   Medication Sig Dispense Refill    acetaminophen (TYLENOL) 500 MG tablet Take 1 tablet (500 mg total) by mouth every 6 (six) hours as needed for Pain. 30 tablet 0    acetaminophen (TYLENOL) 500 MG tablet Take 1 tablet (500 mg total) by mouth every 6 (six) hours as needed. 20 tablet 0    blood sugar diagnostic Strp To check BG 2 times daily, to use with insurance preferred meter 200 each 3    cetirizine (ZYRTEC) 10 MG tablet Take 1 tablet (10 mg total) by mouth once daily. 30 tablet 0    diclofenac sodium (VOLTAREN) 1 % Gel Apply 2 g topically 3 (three) times daily. 50 g 0    diphenhydrAMINE (BENADRYL) 25 mg  capsule Take 1 capsule (25 mg total) by mouth every 6 (six) hours as needed for Itching or Allergies. 20 capsule 0    gabapentin (NEURONTIN) 300 MG capsule TAKE 1 CAPSULE BY MOUTH IN THE EVENING 90 capsule 0    glimepiride (AMARYL) 2 MG tablet Take 1 tablet (2 mg total) by mouth before breakfast. 90 tablet 0    ibuprofen (ADVIL,MOTRIN) 600 MG tablet Take 1 tablet (600 mg total) by mouth every 6 (six) hours as needed for Pain. 20 tablet 0    lancets Misc To check BG 2 times daily, to use with insurance preferred meter 200 each 3    linaGLIPtin (TRADJENTA) 5 mg Tab tablet Take 1 tablet (5 mg total) by mouth once daily. 90 tablet 1    oxyCODONE-acetaminophen (PERCOCET) 5-325 mg per tablet Take 1 tablet by mouth every 4 (four) hours as needed for Pain. 28 each 0    tadalafiL (CIALIS) 5 MG tablet Take 5 mg by mouth.      blood-glucose meter kit To check BG 2 times daily, to use with insurance preferred meter 1 each 0    EPINEPHrine (EPIPEN 2-RAMONE) 0.3 mg/0.3 mL AtIn Inject 0.3 mLs (0.3 mg total) into the muscle once. for 1 dose 0.3 mL 0    famotidine (PEPCID) 20 MG tablet Take 1 tablet (20 mg total) by mouth 2 (two) times daily. (Patient not taking: Reported on 1/6/2025) 20 tablet 0    losartan (COZAAR) 50 MG tablet Take 1 tablet (50 mg total) by mouth once daily. 90 tablet 3     No current facility-administered medications for this visit.     Facility-Administered Medications Ordered in Other Visits   Medication Dose Route Frequency Provider Last Rate Last Admin    sodium chloride 0.9% flush 10 mL  10 mL Intravenous PRN Don Mon MD           ROS  Review of Systems   Constitutional:  Negative for chills and fever.   HENT:  Negative for hearing loss and sore throat.    Eyes:  Negative for visual disturbance.   Respiratory:  Negative for cough and shortness of breath.    Cardiovascular:  Negative for chest pain, palpitations and leg swelling.   Gastrointestinal:  Negative for abdominal pain, constipation, diarrhea,  "nausea and vomiting.   Genitourinary:  Negative for dysuria, frequency and urgency.   Musculoskeletal:  Positive for back pain. Negative for arthralgias, joint swelling and myalgias.   Skin:  Negative for rash and wound.   Neurological:  Negative for headaches.   Psychiatric/Behavioral:  Negative for agitation and confusion. The patient is not nervous/anxious.          OBJECTIVE     Physical Exam  Vitals:    01/06/25 0904   BP: 130/88   Pulse: 87   Temp: 97.9 °F (36.6 °C)    Body mass index is 28.27 kg/m².  Weight: 97.2 kg (214 lb 4.6 oz)   Height: 6' 1" (185.4 cm)     Physical Exam  Constitutional:       General: He is not in acute distress.     Appearance: He is well-developed.   HENT:      Head: Normocephalic and atraumatic.      Right Ear: External ear normal.      Left Ear: External ear normal.      Nose: Nose normal.   Eyes:      General: No scleral icterus.        Right eye: No discharge.         Left eye: No discharge.      Conjunctiva/sclera: Conjunctivae normal.   Neck:      Vascular: No JVD.      Trachea: No tracheal deviation.   Cardiovascular:      Rate and Rhythm: Normal rate and regular rhythm.      Heart sounds: Normal heart sounds. No murmur heard.     No friction rub. No gallop.   Pulmonary:      Effort: Pulmonary effort is normal. No respiratory distress.      Breath sounds: Normal breath sounds. No wheezing.   Abdominal:      General: Bowel sounds are normal. There is no distension.      Palpations: Abdomen is soft. There is no mass.      Tenderness: There is no abdominal tenderness. There is no guarding or rebound.   Musculoskeletal:         General: No tenderness or deformity. Normal range of motion.      Cervical back: Normal range of motion and neck supple.   Skin:     General: Skin is warm and dry.      Findings: No erythema or rash.   Neurological:      Mental Status: He is alert and oriented to person, place, and time.      Motor: No abnormal muscle tone.      Coordination: Coordination " normal.   Psychiatric:         Behavior: Behavior normal.         Thought Content: Thought content normal.         Judgment: Judgment normal.           Health Maintenance         Date Due Completion Date    Pneumococcal Vaccines (Age 50+) (1 of 2 - PCV) Never done ---    Low Dose Statin Never done ---    Shingles Vaccine (1 of 2) Never done ---    Influenza Vaccine (1) 09/01/2024 2/24/2024    COVID-19 Vaccine (3 - 2024-25 season) 09/01/2024 7/8/2021    Diabetes Urine Screening 02/28/2025 2/28/2024    Hemoglobin A1c 05/26/2025 11/26/2024    Lipid Panel 11/26/2025 11/26/2024    Foot Exam 12/03/2025 12/3/2024    Eye Exam 01/06/2026 1/6/2025    TETANUS VACCINE 08/28/2029 8/28/2019    Colorectal Cancer Screening 01/28/2032 1/28/2022    RSV Vaccine (Age 60+ and Pregnant patients) (1 - 1-dose 75+ series) 10/13/2043 ---              ASSESSMENT     54 y.o. male with     1. Annual physical exam    2. Diabetes mellitus due to underlying condition with hyperosmolarity without coma, without long-term current use of insulin    3. Primary hypertension    4. Chronic midline low back pain with left-sided sciatica        PLAN:     1. Annual physical exam  - Counseled on age appropriate medical preventative services, including age appropriate cancer screenings, over all nutritional health, need for a consistent exercise regimen and an over all push towards maintaining a vigorous and active lifestyle.  Counseled on age appropriate vaccines and discussed upcoming health care needs based on age/gender.  Spent time with patient counseling on need for a good patient/doctor relationship moving forward.  Discussed use of common OTC medications and supplements.  Discussed common dietary aids and use of caffeine and the need for good sleep hygiene and stress management.  - CBC Auto Differential; Future  - Comprehensive Metabolic Panel; Future  - TSH; Future    2. Diabetes mellitus due to underlying condition with hyperosmolarity without coma,  without long-term current use of insulin  - Diabetic Eye Screening Photo; Future    3. Primary hypertension  - BP well controlled; at goal of <140/90  - The current medical regimen is effective;  continue present plan and medications.  - losartan (COZAAR) 50 MG tablet; Take 1 tablet (50 mg total) by mouth once daily.  Dispense: 90 tablet; Refill: 3    4. Chronic midline low back pain with left-sided sciatica  - Pt encouraged to apply ice packs 2-3 times daily at 10 minute intervals x 72 hours, then okay to change to heating compress with care not to burn his self; he  voiced understanding   - methylPREDNISolone acetate injection 40 mg  - ketorolac injection 30 mg        RTC in 1-2 weeks as needed for any acute worsening of current condition or failure to improve       Bonny Jones MD  01/06/2025 9:50 AM        No follow-ups on file.

## 2025-03-07 ENCOUNTER — LAB VISIT (OUTPATIENT)
Dept: LAB | Facility: HOSPITAL | Age: 55
End: 2025-03-07
Attending: NURSE PRACTITIONER
Payer: MEDICAID

## 2025-03-07 ENCOUNTER — CLINICAL SUPPORT (OUTPATIENT)
Dept: ENDOCRINOLOGY | Facility: CLINIC | Age: 55
End: 2025-03-07
Payer: MEDICAID

## 2025-03-07 DIAGNOSIS — Z79.4 TYPE 2 DIABETES MELLITUS WITH HYPERGLYCEMIA, WITH LONG-TERM CURRENT USE OF INSULIN: ICD-10-CM

## 2025-03-07 DIAGNOSIS — E11.65 TYPE 2 DIABETES MELLITUS WITH HYPERGLYCEMIA, WITH LONG-TERM CURRENT USE OF INSULIN: ICD-10-CM

## 2025-03-07 DIAGNOSIS — N18.32 STAGE 3B CHRONIC KIDNEY DISEASE: ICD-10-CM

## 2025-03-07 LAB
CREAT SERPL-MCNC: 1.6 MG/DL (ref 0.5–1.4)
EST. GFR  (NO RACE VARIABLE): 50 ML/MIN/1.73 M^2
ESTIMATED AVG GLUCOSE: 157 MG/DL (ref 68–131)
HBA1C MFR BLD: 7.1 % (ref 4–5.6)

## 2025-03-07 PROCEDURE — 36415 COLL VENOUS BLD VENIPUNCTURE: CPT | Performed by: NURSE PRACTITIONER

## 2025-03-07 PROCEDURE — 83036 HEMOGLOBIN GLYCOSYLATED A1C: CPT | Performed by: NURSE PRACTITIONER

## 2025-03-07 PROCEDURE — 82565 ASSAY OF CREATININE: CPT | Performed by: NURSE PRACTITIONER

## 2025-03-07 NOTE — PROGRESS NOTES
Patient is here today to participate in a Continuous Glucose Monitoring Study.  Patient will wear a Dexcom for 7 days in a blinded study.  Patient will be provided with a Dexcom sensor and transmitter and a copy of the Glucose Monitoring Patient Log to fill out during the study.  A detailed explanation of Continuous Glucose Monitoring was provided.   Instructed patient to record meals, drinks,  snacks, activity, and all diabetes medications on glucose log.  Site for insertion was selected and prepared with a sterile alcohol swab and allowed to dry. Glucose Sensor Serial Number [id number] was inserted to abdomen.  Insertion of sensor done in clinic, individually, in private. Time: 15 minutes    
Adult

## 2025-03-12 DIAGNOSIS — E11.9 TYPE 2 DIABETES MELLITUS WITHOUT COMPLICATION: ICD-10-CM

## 2025-03-14 ENCOUNTER — CLINICAL SUPPORT (OUTPATIENT)
Dept: ENDOCRINOLOGY | Facility: CLINIC | Age: 55
End: 2025-03-14
Payer: MEDICAID

## 2025-03-14 ENCOUNTER — OFFICE VISIT (OUTPATIENT)
Dept: ENDOCRINOLOGY | Facility: CLINIC | Age: 55
End: 2025-03-14
Payer: MEDICAID

## 2025-03-14 VITALS
DIASTOLIC BLOOD PRESSURE: 74 MMHG | TEMPERATURE: 97 F | BODY MASS INDEX: 28.68 KG/M2 | SYSTOLIC BLOOD PRESSURE: 167 MMHG | HEART RATE: 61 BPM | WEIGHT: 217.38 LBS

## 2025-03-14 DIAGNOSIS — E11.65 TYPE 2 DIABETES MELLITUS WITH HYPERGLYCEMIA, WITH LONG-TERM CURRENT USE OF INSULIN: Primary | ICD-10-CM

## 2025-03-14 DIAGNOSIS — Z79.4 TYPE 2 DIABETES MELLITUS WITH HYPERGLYCEMIA, WITH LONG-TERM CURRENT USE OF INSULIN: Primary | ICD-10-CM

## 2025-03-14 DIAGNOSIS — N18.31 STAGE 3A CHRONIC KIDNEY DISEASE: ICD-10-CM

## 2025-03-14 PROCEDURE — 99999 PR PBB SHADOW E&M-EST. PATIENT-LVL III: CPT | Mod: PBBFAC,,, | Performed by: NURSE PRACTITIONER

## 2025-03-14 PROCEDURE — 99213 OFFICE O/P EST LOW 20 MIN: CPT | Mod: PBBFAC | Performed by: NURSE PRACTITIONER

## 2025-03-14 RX ORDER — GLIMEPIRIDE 2 MG/1
2 TABLET ORAL
Qty: 90 TABLET | Refills: 1 | Status: SHIPPED | OUTPATIENT
Start: 2025-03-14 | End: 2026-03-14

## 2025-03-14 RX ORDER — LINAGLIPTIN 5 MG/1
5 TABLET, FILM COATED ORAL DAILY
Qty: 90 TABLET | Refills: 1 | Status: SHIPPED | OUTPATIENT
Start: 2025-03-14

## 2025-03-14 NOTE — PROGRESS NOTES
Patient in clinic today to return Glucose Sensor.  Patient tolerated removal well, site intact, no bleeding or drainage.  The CGMS Sensor will be scanned and downloaded. All reports will be imported into the patient's electronic medical record.  Endocrine provider will complete data interpretation and make recommendations.

## 2025-03-14 NOTE — PROGRESS NOTES
CC: This 54 y.o. Black or  male  is here for evaluation of  T2DM along with comorbidities indicated in the Visit Diagnosis section of this encounter.    HPI: Jarrod Mercado was diagnosed with T2DM in his early 50s. Metformin started at the time of diagnosis.   Extensive fam h/o diabetes: mother with ESRD, father, MGF     DM COMPLICATIONS: nephropathy          Prior visit 12/3/24  A1c is down from 6.4 to 5.9%.   Not checking BGs but feels like BG is up and down. Taking his meds consistently daily. Eats a amador egg sandwich with Sprite or diet coke an hour after AM meds.   Plan A1c of 5.9% raises suspicion of hypoglycemia in the setting of sulfonylurea. Unfortunately, I am unable to evaluate glucose trends d/t lack of testing. Will reduce glimepiride d/t low A1c but emphasized importance of monitoring BGs consistently and reviewed complications of uncontrolled glucoses.   Reduce glimepiride to 2 mg once daily before 1st meal of the day.   Recommend testing glucose at least 1x/day. Pt declines personal CGM d/t cost.   Continue Tradjenta.   Return to clinic in 3 months with labs and  Continuous Glucose Monitor (CGM) study.     Interval hx  A1c is up from 5.9 to 7.1%.  Does not know how his BGs are doing. Reports diet was not good during Carnival. Had a cold recently.   Continues to drink sodas. Diet is poor.   Pt underwent CGM study. Forgot his food log.     CGM interpretation:  glucoses overall quite high d/t poor diet and insufficient prandial coverage. However, he does go through short intervals between meals where BG is controlled.         LAST DIABETES EDUCATION: 6/2024       HOSPITALIZED FOR DIABETES OR RELATED COMPLICATIONS -  No.    SIGNIFICANT DIABETES MED HISTORY:   Metformin - diarrhea on once daily schedule     PRESCRIBED DIABETES MEDICATIONS:   Glimepiride 2 mg once daily   Tradjenta 5 mg once daily     Misses medication doses - no      SELF MONITORING BLOOD GLUCOSE: Monitors blood glucose at  "home - none. Does not like needles.      HYPOGLYCEMIC EPISODES: denies diaphoresis or dizziness. Sometimes gets a weak spell that passes after a minute of rest.       CURRENT DIET: last had Sprite and Gatorade a week ago. Drinks diet soda, water; can easily finish a gallon of milk in a week.   Eats 2-3 meals/day. Skips lunch often. Dinner is often late.     Admits that he's a finicky eater.  Has "slacked down" on fast food.     Diet recall:  swiss roll with milk. Lunch was shrimp fried, 2 pork chips, egg roll, soda. Breakfast was breakfast sandwich, hash brown, Sprite.     CURRENT EXERCISE: will start physical therapy; activity on hold after back injury years ago.     SOCIAL:  Lives alone       BP (!) 167/74 (BP Location: Left arm, Patient Position: Sitting)   Pulse 61   Temp 97.3 °F (36.3 °C)   Wt 98.6 kg (217 lb 6.4 oz)   BMI 28.68 kg/m²     ROS:   CONSTITUTIONAL: Appetite good, denies fatigue  NEURO: denies paresthesias to hands/feet         PHYSICAL EXAM:  GENERAL: Well developed, well nourished. No acute distress.   PSYCH: AAOx3, appropriate mood and affect, conversant, well-groomed. Judgement and insight good.   NEURO: Cranial nerves grossly intact. Speech clear.   CHEST: Respirations even and unlabored.         Hemoglobin A1C   Date Value Ref Range Status   03/07/2025 7.1 (H) 4.0 - 5.6 % Final     Comment:     ADA Screening Guidelines:  5.7-6.4%  Consistent with prediabetes  >or=6.5%  Consistent with diabetes    High levels of fetal hemoglobin interfere with the HbA1C  assay. Heterozygous hemoglobin variants (HbS, HgC, etc)do  not significantly interfere with this assay.   However, presence of multiple variants may affect accuracy.     11/26/2024 5.9 (H) 4.0 - 5.6 % Final     Comment:     ADA Screening Guidelines:  5.7-6.4%  Consistent with prediabetes  >or=6.5%  Consistent with diabetes    High levels of fetal hemoglobin interfere with the HbA1C  assay. Heterozygous hemoglobin variants (HbS, HgC, " "etc)do  not significantly interfere with this assay.   However, presence of multiple variants may affect accuracy.     09/04/2024 6.4 (H) 4.0 - 5.6 % Final     Comment:     ADA Screening Guidelines:  5.7-6.4%  Consistent with prediabetes  >or=6.5%  Consistent with diabetes    High levels of fetal hemoglobin interfere with the HbA1C  assay. Heterozygous hemoglobin variants (HbS, HgC, etc)do  not significantly interfere with this assay.   However, presence of multiple variants may affect accuracy.         No results found for: "CPEPTIDE", "GLUTAMICACID", "ISLETCELLANT", "FRUCTOSAMINE"     Lab Results   Component Value Date    CHOL 162 11/26/2024    CHOL 241 (H) 09/26/2023    CHOL 173 08/26/2022     Lab Results   Component Value Date    HDL 49 11/26/2024    HDL 59 09/26/2023    HDL 53 08/26/2022     Lab Results   Component Value Date    LDLCALC 93.4 11/26/2024    LDLCALC 147 (H) 09/26/2023    LDLCALC 86.4 08/26/2022     Lab Results   Component Value Date    TRIG 98 11/26/2024    TRIG 210 (H) 09/26/2023    TRIG 168 (H) 08/26/2022     Lab Results   Component Value Date    CHOLHDL 30.2 11/26/2024    CHOLHDL 4.1 09/26/2023    CHOLHDL 30.6 08/26/2022           Component Value Date/Time     01/06/2025 1010    K 4.5 01/06/2025 1010     01/06/2025 1010    CO2 24 01/06/2025 1010    BUN 22 (H) 01/06/2025 1010    CREATININE 1.6 (H) 03/07/2025 0735     (H) 01/06/2025 1010    CALCIUM 10.2 01/06/2025 1010    ALKPHOS 76 01/06/2025 1010    AST 10 01/06/2025 1010    ALT 13 01/06/2025 1010    BILITOT 0.4 01/06/2025 1010    EGFRNORACEVR 50 (A) 03/07/2025 0735    EGFRNORACEVR 48 (L) 09/26/2023 1431         Lab Results   Component Value Date    LABMICR 175.0 02/28/2024    CREATRANDUR 207.0 02/28/2024    MICALBCREAT 84.5 (H) 02/28/2024             ASSESSMENT and PLAN:    A1C GOAL: < 7 %        1. Type 2 diabetes mellitus with hyperglycemia, with long-term current use of insulin  Reviewed CGM data.   Recommended starting " semaglutide or restarting Jardiance/Farxiga for kidney protection. Pt will consider semaglutide at next visit. H/o balanitis with Jardiance so he is adamant against resuming SGLT2i.     Continue current tx. Will not increase glimepiride as I suspect he will have periods of daytime hypoglycemia that could lead to non-adherence. Pt does have h/o medication non-adherence.     He wishes to focus on dietary changes.   Recommend testing BG 1-2x/day. Reviewed glycemic goals.   Declines another visit with educator at this time.   Return to clinic in 4 months with labs prior with  Continuous Glucose Monitor (CGM) study.     linaGLIPtin (TRADJENTA) 5 mg Tab tablet    Hemoglobin A1C    GLUCOSE MONITORING CONTINUOUS MIN 72 HOURS      2. Stage 3a chronic kidney disease  Reinforced importance of glucose control.   As above.                  Orders Placed This Encounter   Procedures    Hemoglobin A1C     Standing Status:   Future     Expected Date:   3/14/2025     Expiration Date:   5/13/2026     Send normal result to authorizing provider's In Basket if patient is active on MyChart::   Yes    GLUCOSE MONITORING CONTINUOUS MIN 72 HOURS     Standing Status:   Future     Expiration Date:   3/15/2026        Follow up in about 4 months (around 7/14/2025).

## 2025-03-31 ENCOUNTER — PATIENT MESSAGE (OUTPATIENT)
Dept: ADMINISTRATIVE | Facility: HOSPITAL | Age: 55
End: 2025-03-31
Payer: MEDICAID

## 2025-06-10 ENCOUNTER — PATIENT MESSAGE (OUTPATIENT)
Dept: ADMINISTRATIVE | Facility: HOSPITAL | Age: 55
End: 2025-06-10
Payer: MEDICAID

## 2025-07-07 ENCOUNTER — RESULTS FOLLOW-UP (OUTPATIENT)
Dept: FAMILY MEDICINE | Facility: CLINIC | Age: 55
End: 2025-07-07

## 2025-07-07 ENCOUNTER — OFFICE VISIT (OUTPATIENT)
Dept: FAMILY MEDICINE | Facility: CLINIC | Age: 55
End: 2025-07-07
Payer: MEDICAID

## 2025-07-07 ENCOUNTER — LAB VISIT (OUTPATIENT)
Dept: LAB | Facility: HOSPITAL | Age: 55
End: 2025-07-07
Attending: INTERNAL MEDICINE
Payer: MEDICAID

## 2025-07-07 VITALS
HEART RATE: 54 BPM | OXYGEN SATURATION: 99 % | TEMPERATURE: 98 F | BODY MASS INDEX: 29.41 KG/M2 | DIASTOLIC BLOOD PRESSURE: 88 MMHG | WEIGHT: 222.88 LBS | SYSTOLIC BLOOD PRESSURE: 138 MMHG

## 2025-07-07 DIAGNOSIS — R79.89 ABNORMAL TSH: ICD-10-CM

## 2025-07-07 DIAGNOSIS — E08.00 DIABETES MELLITUS DUE TO UNDERLYING CONDITION WITH HYPEROSMOLARITY WITHOUT COMA, WITHOUT LONG-TERM CURRENT USE OF INSULIN: Primary | ICD-10-CM

## 2025-07-07 DIAGNOSIS — G44.329 CHRONIC POST-TRAUMATIC HEADACHE, NOT INTRACTABLE: ICD-10-CM

## 2025-07-07 DIAGNOSIS — Z23 NEED FOR SHINGLES VACCINE: ICD-10-CM

## 2025-07-07 DIAGNOSIS — E11.9 TYPE 2 DIABETES MELLITUS WITHOUT COMPLICATION: ICD-10-CM

## 2025-07-07 DIAGNOSIS — E11.65 TYPE 2 DIABETES MELLITUS WITH HYPERGLYCEMIA, WITH LONG-TERM CURRENT USE OF INSULIN: ICD-10-CM

## 2025-07-07 DIAGNOSIS — Z23 COVID-19 VACCINE ADMINISTERED: ICD-10-CM

## 2025-07-07 DIAGNOSIS — R07.9 CHEST PAIN, UNSPECIFIED TYPE: ICD-10-CM

## 2025-07-07 DIAGNOSIS — Z79.4 TYPE 2 DIABETES MELLITUS WITH HYPERGLYCEMIA, WITH LONG-TERM CURRENT USE OF INSULIN: ICD-10-CM

## 2025-07-07 LAB
ALBUMIN/CREAT UR: 31 UG/MG
CREAT UR-MCNC: 129 MG/DL (ref 23–375)
EAG (OHS): 157 MG/DL (ref 68–131)
HBA1C MFR BLD: 7.1 % (ref 4–5.6)
MICROALBUMIN UR-MCNC: 40 UG/ML (ref ?–5000)
T4 FREE SERPL-MCNC: 0.89 NG/DL (ref 0.71–1.51)
T4 FREE SERPL-MCNC: 0.89 NG/DL (ref 0.71–1.51)
TSH SERPL-ACNC: 2.02 UIU/ML (ref 0.4–4)

## 2025-07-07 PROCEDURE — G2211 COMPLEX E/M VISIT ADD ON: HCPCS | Mod: ,,, | Performed by: INTERNAL MEDICINE

## 2025-07-07 PROCEDURE — 99999PBSHW PR PBB SHADOW TECHNICAL ONLY FILED TO HB: Mod: PBBFAC,,,

## 2025-07-07 PROCEDURE — 90750 HZV VACC RECOMBINANT IM: CPT | Mod: PBBFAC,PO

## 2025-07-07 PROCEDURE — 90480 ADMN SARSCOV2 VAC 1/ONLY CMP: CPT | Mod: PBBFAC,PO

## 2025-07-07 PROCEDURE — 91320 SARSCV2 VAC 30MCG TRS-SUC IM: CPT | Mod: PBBFAC,PO

## 2025-07-07 PROCEDURE — 99213 OFFICE O/P EST LOW 20 MIN: CPT | Mod: PBBFAC,PO | Performed by: INTERNAL MEDICINE

## 2025-07-07 PROCEDURE — 3079F DIAST BP 80-89 MM HG: CPT | Mod: CPTII,,, | Performed by: INTERNAL MEDICINE

## 2025-07-07 PROCEDURE — 3075F SYST BP GE 130 - 139MM HG: CPT | Mod: CPTII,,, | Performed by: INTERNAL MEDICINE

## 2025-07-07 PROCEDURE — 84443 ASSAY THYROID STIM HORMONE: CPT

## 2025-07-07 PROCEDURE — 1160F RVW MEDS BY RX/DR IN RCRD: CPT | Mod: CPTII,,, | Performed by: INTERNAL MEDICINE

## 2025-07-07 PROCEDURE — 3051F HG A1C>EQUAL 7.0%<8.0%: CPT | Mod: CPTII,,, | Performed by: INTERNAL MEDICINE

## 2025-07-07 PROCEDURE — 99999 PR PBB SHADOW E&M-EST. PATIENT-LVL III: CPT | Mod: PBBFAC,,, | Performed by: INTERNAL MEDICINE

## 2025-07-07 PROCEDURE — 1159F MED LIST DOCD IN RCRD: CPT | Mod: CPTII,,, | Performed by: INTERNAL MEDICINE

## 2025-07-07 PROCEDURE — 99213 OFFICE O/P EST LOW 20 MIN: CPT | Mod: S$PBB,,, | Performed by: INTERNAL MEDICINE

## 2025-07-07 PROCEDURE — 90471 IMMUNIZATION ADMIN: CPT | Mod: PBBFAC,PO

## 2025-07-07 PROCEDURE — 83036 HEMOGLOBIN GLYCOSYLATED A1C: CPT

## 2025-07-07 PROCEDURE — 4010F ACE/ARB THERAPY RXD/TAKEN: CPT | Mod: CPTII,,, | Performed by: INTERNAL MEDICINE

## 2025-07-07 PROCEDURE — 84439 ASSAY OF FREE THYROXINE: CPT

## 2025-07-07 PROCEDURE — 82043 UR ALBUMIN QUANTITATIVE: CPT

## 2025-07-07 PROCEDURE — 36415 COLL VENOUS BLD VENIPUNCTURE: CPT

## 2025-07-07 PROCEDURE — 3008F BODY MASS INDEX DOCD: CPT | Mod: CPTII,,, | Performed by: INTERNAL MEDICINE

## 2025-07-07 RX ORDER — GLIMEPIRIDE 2 MG/1
2 TABLET ORAL
Qty: 90 TABLET | Refills: 1 | Status: CANCELLED | OUTPATIENT
Start: 2025-07-07 | End: 2026-07-07

## 2025-07-07 RX ADMIN — Medication 0.5 ML: at 09:07

## 2025-07-07 RX ADMIN — COVID-19 VACCINE, MRNA 0.3 ML: 0.04 INJECTION, SUSPENSION INTRAMUSCULAR at 09:07

## 2025-07-07 NOTE — PROGRESS NOTES
Patient identified using full name and . Covid IM and Shingrix IM  given and tolerated. Patient observed for 15 minutes for any drug reactions or side effects.  No adverse reaction noted.  Patient released from clinic in stable condition.

## 2025-07-07 NOTE — PROGRESS NOTES
SUBJECTIVE     No chief complaint on file.      HPI  Jarrod Mercado is a 54 y.o. male with multiple medical diagnoses as listed in the medical history and problem list that presents for evaluation for DM2. Pt has been doing well since last visit. he is fully compliant with meds and denies any adverse side effects. Pt is somewhat compliant with an ADA diet and does not exercise. Pt does not check her blood sugar levels at home. Pt reports infrequent hypoglycemia since last visit. Pt presents for med refills today and is without any other complaints.     PAST MEDICAL HISTORY:  Past Medical History:   Diagnosis Date    Diabetes mellitus     Diabetes mellitus type I     Hyperlipidemia associated with type 2 diabetes mellitus 9/27/2023    Hypertension        PAST SURGICAL HISTORY:  Past Surgical History:   Procedure Laterality Date    COLONOSCOPY  01/28/2022    COLONOSCOPY N/A 01/28/2022    Procedure: COLONOSCOPY;  Surgeon: Don Mon MD;  Location: [facility];  Service: Endoscopy;  Laterality: N/A;    INCISION AND DRAINAGE OF HAND Right 3/4/2024    Procedure: INCISION AND DRAINAGE, HAND;  Surgeon: Wilfrido Prado MD;  Location: [facility] [place] OR;  Service: Orthopedics;  Laterality: Right;  possible I&D depending on how incision looks morning of, cultures    INCISION AND DRAINAGE OF WOUND  03/04/2024    Middle finger and thumb    OPEN REDUCTION AND INTERNAL FIXATION (ORIF) OF INJURY OF FINGER  03/04/2024    Middle finger    OPEN REDUCTION AND INTERNAL FIXATION (ORIF) OF INJURY OF FINGER Right 3/4/2024    Procedure: ORIF, FINGER;  Surgeon: Wilfrido Prado MD;  Location: [facility] [place] [facility];  Service: Orthopedics;  Laterality: Right;  right middle finger, c-arm, accumed    right knee         SOCIAL HISTORY:  Social History[1]    FAMILY HISTORY:  Family History   Problem Relation Name Age of Onset    Kidney disease Mother      Hypertension Father      Diabetes type II Father         ALLERGIES AND MEDICATIONS: updated and  reviewed.  Review of patient's allergies indicates:  No Known Allergies  Current Medications[2]    ROS  Review of Systems   Constitutional:  Negative for chills and fever.   HENT:  Negative for hearing loss and sore throat.    Eyes:  Negative for visual disturbance.   Respiratory:  Negative for cough and shortness of breath.    Cardiovascular:  Positive for chest pain. Negative for palpitations and leg swelling.   Gastrointestinal:  Negative for abdominal pain, constipation, diarrhea, nausea and vomiting.   Genitourinary:  Negative for dysuria, frequency and urgency.   Musculoskeletal:  Negative for arthralgias, joint swelling and myalgias.   Skin:  Negative for rash and wound.   Neurological:  Positive for headaches (behind the right eye).   Psychiatric/Behavioral:  Negative for agitation and confusion. The patient is not nervous/anxious.          OBJECTIVE     Physical Exam  Vitals:    07/07/25 0912   BP: 138/88   Pulse: (!) 54   Temp: 97.9 °F (36.6 °C)    Body mass index is 29.41 kg/m².  Weight: 101.1 kg (222 lb 14.2 oz)         Physical Exam  Constitutional:       General: He is not in acute distress.     Appearance: He is well-developed.   HENT:      Head: Normocephalic and atraumatic.      Right Ear: External ear normal.      Left Ear: External ear normal.      Nose: Nose normal.   Eyes:      General: No scleral icterus.        Right eye: No discharge.         Left eye: No discharge.      Conjunctiva/sclera: Conjunctivae normal.   Neck:      Vascular: No JVD.      Trachea: No tracheal deviation.   Cardiovascular:      Rate and Rhythm: Normal rate and regular rhythm.      Heart sounds: Normal heart sounds. No murmur heard.     No friction rub. No gallop.   Pulmonary:      Effort: Pulmonary effort is normal. No respiratory distress.      Breath sounds: Normal breath sounds. No wheezing.   Abdominal:      General: Bowel sounds are normal. There is no distension.      Palpations: Abdomen is soft. There is no mass.       Tenderness: There is no abdominal tenderness. There is no guarding or rebound.   Musculoskeletal:         General: No tenderness or deformity. Normal range of motion.      Cervical back: Normal range of motion and neck supple.   Skin:     General: Skin is warm and dry.      Findings: No erythema or rash.   Neurological:      Mental Status: He is alert and oriented to person, place, and time.      Motor: No abnormal muscle tone.      Coordination: Coordination normal.   Psychiatric:         Behavior: Behavior normal.         Thought Content: Thought content normal.         Judgment: Judgment normal.           Health Maintenance         Date Due Completion Date    Low Dose Statin Never done ---    Diabetes Urine Screening 02/28/2025 2/28/2024    Influenza Vaccine (1) 09/01/2025 2/24/2024    Hemoglobin A1c 09/07/2025 3/7/2025    Lipid Panel 11/26/2025 11/26/2024    Foot Exam 12/03/2025 12/3/2024    Diabetic Eye Exam 01/06/2026 1/6/2025    TETANUS VACCINE 08/28/2029 8/28/2019    Colorectal Cancer Screening 01/28/2032 1/28/2022    RSV Vaccine (Age 60+ and Pregnant patients) (1 - 1-dose 75+ series) 10/13/2043 ---              ASSESSMENT     54 y.o. male with     1. Diabetes mellitus due to underlying condition with hyperosmolarity without coma, without long-term current use of insulin    2. Chronic post-traumatic headache, not intractable    3. Chest pain, unspecified type    4. Need for shingles vaccine    5. COVID-19 vaccine administered        PLAN:     1. Diabetes mellitus due to underlying condition with hyperosmolarity without coma, without long-term current use of insulin  - Stable; no acute issues  - continue management per endocrinology    2. Chronic post-traumatic headache, not intractable  - Stable; no acute issues  - patient okay to continue Tylenol or NSAIDs p.r.n. pain    3. Chest pain, unspecified type  - resolved  - patient reportedly had a stress test last year at the [facility], which  was negative    4. Need for shingles vaccine  - varicella zoster (Shingrix) IM vaccine (>/= 49 yo)    5. COVID-19 vaccine administered  - COVID-19 (Pfizer) 30 mcg/0.3 mL IM vaccine (>/= 11 yo) 0.3 mL            RTC in 6 months for annual exam     Bonny Jones MD  07/07/2025 1:22 PM        No follow-ups on file.             [1]   Social History  Socioeconomic History    Marital status: Single   Tobacco Use    Smoking status: Never    Smokeless tobacco: Never   Substance and Sexual Activity    Alcohol use: Yes     Comment: occasionally    Drug use: No    Sexual activity: Yes     Partners: Female     Social Drivers of Health     Financial Resource Strain: Low Risk  (12/28/2023)    Overall Financial Resource Strain (CARDIA)     Difficulty of Paying Living Expenses: Not hard at all   Food Insecurity: No Food Insecurity (12/28/2023)    Hunger Vital Sign     Worried About Running Out of Food in the Last Year: Never true     Ran Out of Food in the Last Year: Never true   Transportation Needs: No Transportation Needs (12/28/2023)    PRAPARE - Transportation     Lack of Transportation (Medical): No     Lack of Transportation (Non-Medical): No   Physical Activity: Unknown (12/28/2023)    Exercise Vital Sign     Days of Exercise per Week: 3 days   Stress: Stress Concern Present (12/28/2023)    [employer] - Occupational Stress Questionnaire     Feeling of Stress : To some extent   Housing Stability: Low Risk  (12/28/2023)    Housing Stability Vital Sign     Unable to Pay for Housing in the Last Year: No     Number of Places Lived in the Last Year: 1     Unstable Housing in the Last Year: No   [2]   Current Outpatient Medications   Medication Sig Dispense Refill    blood sugar diagnostic Strp To check BG 2 times daily, to use with insurance preferred meter 200 each 3    diclofenac sodium (VOLTAREN) 1 % Gel Apply 2 g topically 3 (three) times daily. 50 g 0    diphenhydrAMINE (BENADRYL) 25 mg  capsule Take 1 capsule (25 mg total) by mouth every 6 (six) hours as needed for Itching or Allergies. 20 capsule 0    gabapentin (NEURONTIN) 300 MG capsule TAKE 1 CAPSULE BY MOUTH IN THE EVENING 90 capsule 0    glimepiride (AMARYL) 2 MG tablet Take 1 tablet (2 mg total) by mouth before breakfast. 90 tablet 1    ibuprofen (ADVIL,MOTRIN) 600 MG tablet Take 1 tablet (600 mg total) by mouth every 6 (six) hours as needed for Pain. 20 tablet 0    lancets Misc To check BG 2 times daily, to use with insurance preferred meter 200 each 3    linaGLIPtin (TRADJENTA) 5 mg Tab tablet Take 1 tablet (5 mg total) by mouth once daily. 90 tablet 1    losartan (COZAAR) 50 MG tablet Take 1 tablet (50 mg total) by mouth once daily. 90 tablet 3    tadalafiL (CIALIS) 5 MG tablet Take 5 mg by mouth.      acetaminophen (TYLENOL) 500 MG tablet Take 1 tablet (500 mg total) by mouth every 6 (six) hours as needed for Pain. (Patient not taking: Reported on 7/7/2025) 30 tablet 0    blood-glucose meter kit To check BG 2 times daily, to use with insurance preferred meter 1 each 0    cetirizine (ZYRTEC) 10 MG tablet Take 1 tablet (10 mg total) by mouth once daily. 30 tablet 0    EPINEPHrine (EPIPEN 2-RAMONE) 0.3 mg/0.3 mL AtIn Inject 0.3 mLs (0.3 mg total) into the muscle once. for 1 dose 0.3 mL 0     No current facility-administered medications for this visit.     Facility-Administered Medications Ordered in Other Visits   Medication Dose Route Frequency Provider Last Rate Last Admin    sodium chloride 0.9% flush 10 mL  10 mL Intravenous PRN Don Mon MD

## 2025-07-08 ENCOUNTER — CLINICAL SUPPORT (OUTPATIENT)
Dept: ENDOCRINOLOGY | Facility: CLINIC | Age: 55
End: 2025-07-08
Payer: MEDICAID

## 2025-07-08 DIAGNOSIS — E11.65 TYPE 2 DIABETES MELLITUS WITH HYPERGLYCEMIA, WITH LONG-TERM CURRENT USE OF INSULIN: ICD-10-CM

## 2025-07-08 DIAGNOSIS — Z79.4 TYPE 2 DIABETES MELLITUS WITH HYPERGLYCEMIA, WITH LONG-TERM CURRENT USE OF INSULIN: ICD-10-CM

## 2025-07-08 PROCEDURE — 99999PBSHW PR PBB SHADOW TECHNICAL ONLY FILED TO HB: Mod: PBBFAC,,,

## 2025-07-08 PROCEDURE — 95250 CONT GLUC MNTR PHYS/QHP EQP: CPT | Mod: PBBFAC

## 2025-07-14 ENCOUNTER — CLINICAL SUPPORT (OUTPATIENT)
Dept: ENDOCRINOLOGY | Facility: CLINIC | Age: 55
End: 2025-07-14
Payer: MEDICAID

## 2025-07-14 ENCOUNTER — OFFICE VISIT (OUTPATIENT)
Dept: ENDOCRINOLOGY | Facility: CLINIC | Age: 55
End: 2025-07-14
Payer: MEDICAID

## 2025-07-14 VITALS
TEMPERATURE: 97 F | BODY MASS INDEX: 29.58 KG/M2 | WEIGHT: 224.19 LBS | DIASTOLIC BLOOD PRESSURE: 97 MMHG | HEART RATE: 57 BPM | SYSTOLIC BLOOD PRESSURE: 161 MMHG

## 2025-07-14 DIAGNOSIS — Z79.4 TYPE 2 DIABETES MELLITUS WITH HYPERGLYCEMIA, WITH LONG-TERM CURRENT USE OF INSULIN: Primary | ICD-10-CM

## 2025-07-14 DIAGNOSIS — E11.65 TYPE 2 DIABETES MELLITUS WITH HYPERGLYCEMIA, WITH LONG-TERM CURRENT USE OF INSULIN: Primary | ICD-10-CM

## 2025-07-14 DIAGNOSIS — R80.9 MICROALBUMINURIA: ICD-10-CM

## 2025-07-14 PROCEDURE — 4010F ACE/ARB THERAPY RXD/TAKEN: CPT | Mod: CPTII,,, | Performed by: NURSE PRACTITIONER

## 2025-07-14 PROCEDURE — 3077F SYST BP >= 140 MM HG: CPT | Mod: CPTII,,, | Performed by: NURSE PRACTITIONER

## 2025-07-14 PROCEDURE — 95251 CONT GLUC MNTR ANALYSIS I&R: CPT | Mod: ,,, | Performed by: NURSE PRACTITIONER

## 2025-07-14 PROCEDURE — 3066F NEPHROPATHY DOC TX: CPT | Mod: CPTII,,, | Performed by: NURSE PRACTITIONER

## 2025-07-14 PROCEDURE — 99999PBSHW PR PBB SHADOW TECHNICAL ONLY FILED TO HB: Mod: PBBFAC,,,

## 2025-07-14 PROCEDURE — 3051F HG A1C>EQUAL 7.0%<8.0%: CPT | Mod: CPTII,,, | Performed by: NURSE PRACTITIONER

## 2025-07-14 PROCEDURE — 3008F BODY MASS INDEX DOCD: CPT | Mod: CPTII,,, | Performed by: NURSE PRACTITIONER

## 2025-07-14 PROCEDURE — 3080F DIAST BP >= 90 MM HG: CPT | Mod: CPTII,,, | Performed by: NURSE PRACTITIONER

## 2025-07-14 PROCEDURE — 99499 UNLISTED E&M SERVICE: CPT | Mod: S$PBB,,, | Performed by: NURSE PRACTITIONER

## 2025-07-14 PROCEDURE — G2211 COMPLEX E/M VISIT ADD ON: HCPCS | Mod: ,,, | Performed by: NURSE PRACTITIONER

## 2025-07-14 PROCEDURE — 3060F POS MICROALBUMINURIA REV: CPT | Mod: CPTII,,, | Performed by: NURSE PRACTITIONER

## 2025-07-14 PROCEDURE — 99213 OFFICE O/P EST LOW 20 MIN: CPT | Mod: PBBFAC | Performed by: NURSE PRACTITIONER

## 2025-07-14 PROCEDURE — 99999 PR PBB SHADOW E&M-EST. PATIENT-LVL III: CPT | Mod: PBBFAC,,, | Performed by: NURSE PRACTITIONER

## 2025-07-14 PROCEDURE — 95250 CONT GLUC MNTR PHYS/QHP EQP: CPT | Mod: PBBFAC

## 2025-07-14 PROCEDURE — 99214 OFFICE O/P EST MOD 30 MIN: CPT | Mod: S$PBB,,, | Performed by: NURSE PRACTITIONER

## 2025-07-14 PROCEDURE — 1160F RVW MEDS BY RX/DR IN RCRD: CPT | Mod: CPTII,,, | Performed by: NURSE PRACTITIONER

## 2025-07-14 PROCEDURE — 1159F MED LIST DOCD IN RCRD: CPT | Mod: CPTII,,, | Performed by: NURSE PRACTITIONER

## 2025-07-14 RX ORDER — GLIMEPIRIDE 4 MG/1
4 TABLET ORAL
Qty: 90 TABLET | Refills: 1 | Status: SHIPPED | OUTPATIENT
Start: 2025-07-14 | End: 2026-07-14

## 2025-07-14 NOTE — Clinical Note
Ashley Jones, patient has been transferred back to Primary Care. Please see my note for details. Can you please help arrange f/u with Primary?  I've sent enough refills for 6 months. Thank you, Clover De Souza NP

## 2025-07-14 NOTE — PATIENT INSTRUCTIONS
Discussed starting Rybelsus versus increasing glimepiride to 4 mg. Pt previously on glimepiride 4 mg with A1c of 5.9% and it was reduced d/t risk of hypoglycemia and pt's lack of BG testing. Switching from glimepiride QD to glipizide BID was also considered to avoid midday hypoglycemia, but pt admits he is unlikely to take med BID consistently.     Pt prefers to increase glimepiride to 4 mg.   Rx for glimepiride   4 mg sent.    Continue Tradjenta 5 mg.     Recommend testing BG at least 1x/day especially in the afternoon/evening.     Return to clinic as needed.   F/u with Primary for chronic diabetes management.

## 2025-07-14 NOTE — PROGRESS NOTES
CC: This 54 y.o. Black or  male  is here for evaluation of  T2DM along with comorbidities indicated in the Visit Diagnosis section of this encounter.    HPI: Jarrod Mercado was diagnosed with T2DM in his early 50s. Metformin started at the time of diagnosis.   Extensive fam h/o diabetes: mother with ESRD, father, MGF     DM COMPLICATIONS: nephropathy      Prior visit 3/14/25  A1c is up from 5.9 to 7.1%.  Does not know how his BGs are doing. Reports diet was not good during Carnival. Had a cold recently.   Continues to drink sodas. Diet is poor.   Pt underwent CGM study. Forgot his food log.   CGM interpretation:  glucoses overall quite high d/t poor diet and insufficient prandial coverage. However, he does go through short intervals between meals where BG is controlled.   Plan Reviewed CGM data.   Recommended starting semaglutide or restarting Jardiance/Farxiga for kidney protection. Pt will consider semaglutide at next visit. H/o balanitis with Jardiance so he is adamant against resuming SGLT2i.   Continue current tx. Will not increase glimepiride as I suspect he will have periods of daytime hypoglycemia that could lead to non-adherence. Pt does have h/o medication non-adherence.   He wishes to focus on dietary changes.   Recommend testing BG 1-2x/day. Reviewed glycemic goals.   Declines another visit with educator at this time.   Return to clinic in 4 months with labs prior  with  Continuous Glucose Monitor (CGM) study.         Interval hx  A1c remains the same at 7.1%.  Patient underwent CGM study.  Does not bring in food diary but reports he did miss medication at least one night last week and had large meals in the evenings.     CGM interpretation:  No hypoglycemia.  Glucoses remain high after meals rising as high as 350 secondary to diet, medication nonadherence, and insufficient prandial coverage from DM meds.          LAST DIABETES EDUCATION: 6/2024       HOSPITALIZED FOR DIABETES OR RELATED  "COMPLICATIONS -  No.    SIGNIFICANT DIABETES MED HISTORY:   Metformin - diarrhea on once daily schedule   Jardiance - balanitis; declines re-trial       PRESCRIBED DIABETES MEDICATIONS:   Glimepiride 2 mg once daily   Tradjenta 5 mg once daily     Misses medication doses - yes      SELF MONITORING BLOOD GLUCOSE: Monitors blood glucose at home - none. Does not like needles.      HYPOGLYCEMIC EPISODES: denies diaphoresis or dizziness. Sometimes gets a weak spell that passes after a minute of rest.       CURRENT DIET: last had Sprite and Gatorade a week ago. Drinks diet soda, water; can easily finish a gallon of milk in a week.   Eats 2-3 meals/day. Skips lunch often. Dinner is often late.     Admits that he's a finicky eater.  Has "slacked down" on fast food.         CURRENT EXERCISE: none    SOCIAL:  Lives alone       BP (!) 161/97 (BP Location: Left arm, Patient Position: Sitting)   Pulse (!) 57   Temp 97.3 °F (36.3 °C)   Wt 101.7 kg (224 lb 3.2 oz)   BMI 29.58 kg/m²     ROS:   CONSTITUTIONAL: Appetite good, denies fatigue      PHYSICAL EXAM:  GENERAL: Well developed, well nourished. No acute distress.   PSYCH: AAOx3, appropriate mood and affect, conversant, well-groomed. Judgement and insight good.   NEURO: Cranial nerves grossly intact. Speech clear.   CHEST: Respirations even and unlabored.         Hemoglobin A1C   Date Value Ref Range Status   03/07/2025 7.1 (H) 4.0 - 5.6 % Final     Comment:     ADA Screening Guidelines:  5.7-6.4%  Consistent with prediabetes  >or=6.5%  Consistent with diabetes    High levels of fetal hemoglobin interfere with the HbA1C  assay. Heterozygous hemoglobin variants (HbS, HgC, etc)do  not significantly interfere with this assay.   However, presence of multiple variants may affect accuracy.     11/26/2024 5.9 (H) 4.0 - 5.6 % Final     Comment:     ADA Screening Guidelines:  5.7-6.4%  Consistent with prediabetes  >or=6.5%  Consistent with diabetes    High levels of fetal hemoglobin " "interfere with the HbA1C  assay. Heterozygous hemoglobin variants (HbS, HgC, etc)do  not significantly interfere with this assay.   However, presence of multiple variants may affect accuracy.     09/04/2024 6.4 (H) 4.0 - 5.6 % Final     Comment:     ADA Screening Guidelines:  5.7-6.4%  Consistent with prediabetes  >or=6.5%  Consistent with diabetes    High levels of fetal hemoglobin interfere with the HbA1C  assay. Heterozygous hemoglobin variants (HbS, HgC, etc)do  not significantly interfere with this assay.   However, presence of multiple variants may affect accuracy.       Hemoglobin A1c   Date Value Ref Range Status   07/07/2025 7.1 (H) 4.0 - 5.6 % Final     Comment:     ADA Screening Guidelines:  5.7-6.4%  Consistent with prediabetes  >=6.5%  Consistent with diabetes    High levels of fetal hemoglobin interfere with the HbA1C  assay. Heterozygous hemoglobin variants (HbS, HgC, etc)do  not significantly interfere with this assay.   However, presence of multiple variants may affect accuracy.       No results found for: "CPEPTIDE", "GLUTAMICACID", "ISLETCELLANT", "FRUCTOSAMINE"     Lab Results   Component Value Date    CHOL 162 11/26/2024    CHOL 241 (H) 09/26/2023    CHOL 173 08/26/2022     Lab Results   Component Value Date    HDL 49 11/26/2024    HDL 59 09/26/2023    HDL 53 08/26/2022     Lab Results   Component Value Date    LDLCALC 93.4 11/26/2024    LDLCALC 147 (H) 09/26/2023    LDLCALC 86.4 08/26/2022     Lab Results   Component Value Date    TRIG 98 11/26/2024    TRIG 210 (H) 09/26/2023    TRIG 168 (H) 08/26/2022     Lab Results   Component Value Date    CHOLHDL 30.2 11/26/2024    CHOLHDL 4.1 09/26/2023    CHOLHDL 30.6 08/26/2022           Component Value Date/Time     01/06/2025 1010    K 4.5 01/06/2025 1010     01/06/2025 1010    CO2 24 01/06/2025 1010    BUN 22 (H) 01/06/2025 1010    CREATININE 1.6 (H) 03/07/2025 0735     (H) 01/06/2025 1010    CALCIUM 10.2 01/06/2025 1010    THOMAS " 76 01/06/2025 1010    AST 10 01/06/2025 1010    ALT 13 01/06/2025 1010    BILITOT 0.4 01/06/2025 1010    EGFRNORACEVR 50 (A) 03/07/2025 0735    EGFRNORACEVR 48 (L) 09/26/2023 1431         Lab Results   Component Value Date    LABMICR 40.0 07/07/2025    CREATRANDUR 129.0 07/07/2025    MICALBCREAT 31.0 (H) 07/07/2025             ASSESSMENT and PLAN:    A1C GOAL: < 7 %      1. Type 2 diabetes mellitus with hyperglycemia, with long-term current use of insulin  Diabetes control complicated by poor diet, medication non-adherence, lack of glucose testing, and aversion to starting new medication.    Discussed starting Rybelsus versus increasing glimepiride to 4 mg. Pt previously on glimepiride 4 mg with A1c of 5.9% and it was reduced d/t risk of hypoglycemia and lack of BG testing. Switching from glimepiride QD to glipizide BID was also considered to avoid midday hypoglycemia since patient does skip lunch often, but pt admits he is unlikely to take med BID consistently.     Pt prefers to increase glimepiride to 4 mg.   Rx for glimepiride   4 mg sent.    Continue Tradjenta 5 mg.     Recommend testing BG at least 1x/day especially in the afternoon/evening.     Return to Endocrine as needed.   F/u with Primary for chronic diabetes management.     linaGLIPtin (TRADJENTA) 5 mg Tab tablet      2. Microalbuminuria  Continue losartan.             No orders of the defined types were placed in this encounter.       Follow up if symptoms worsen or fail to improve.

## 2025-07-21 ENCOUNTER — TELEPHONE (OUTPATIENT)
Dept: FAMILY MEDICINE | Facility: CLINIC | Age: 55
End: 2025-07-21
Payer: MEDICAID

## 2025-07-21 NOTE — TELEPHONE ENCOUNTER
----- Message from Bonny Jones MD sent at 7/21/2025  9:01 AM CDT -----  @Clover: Excellent, thank you so much!    @Team: Can you please help pt get the scheduled for a 6 month f/u? Thank you so much!    Bonny  ----- Message -----  From: Clover De Souza NP  Sent: 7/14/2025   8:33 AM CDT  To: Bonny Jones MD    Hello , patient has been transferred back to Primary Care. Please see my note for details. Can you please help arrange f/u with Primary?  I've sent enough refills for 6 months. Thank you, Clover De Souza NP